# Patient Record
Sex: FEMALE | Race: BLACK OR AFRICAN AMERICAN | Employment: UNEMPLOYED | URBAN - METROPOLITAN AREA
[De-identification: names, ages, dates, MRNs, and addresses within clinical notes are randomized per-mention and may not be internally consistent; named-entity substitution may affect disease eponyms.]

---

## 2017-04-27 ENCOUNTER — HOSPITAL ENCOUNTER (OUTPATIENT)
Dept: MAMMOGRAPHY | Age: 57
Discharge: HOME | End: 2017-04-27
Attending: NURSE PRACTITIONER

## 2017-04-27 DIAGNOSIS — Z12.31 VISIT FOR SCREENING MAMMOGRAM: ICD-10-CM

## 2017-04-27 PROCEDURE — 77067 SCR MAMMO BI INCL CAD: CPT

## 2017-06-19 ENCOUNTER — HOSPITAL ENCOUNTER (OUTPATIENT)
Dept: LAB | Age: 57
Discharge: HOME | End: 2017-06-19

## 2017-06-19 PROCEDURE — 88305 TISSUE EXAM BY PATHOLOGIST: CPT | Performed by: INTERNAL MEDICINE

## 2018-06-16 ENCOUNTER — HOSPITAL ENCOUNTER (OUTPATIENT)
Dept: MAMMOGRAPHY | Age: 58
Discharge: HOME | End: 2018-06-16
Attending: NURSE PRACTITIONER
Payer: SELF-PAY

## 2018-06-16 DIAGNOSIS — Z12.31 VISIT FOR SCREENING MAMMOGRAM: ICD-10-CM

## 2018-06-16 PROCEDURE — 77063 BREAST TOMOSYNTHESIS BI: CPT

## 2019-07-19 ENCOUNTER — HOSPITAL ENCOUNTER (OUTPATIENT)
Dept: MAMMOGRAPHY | Age: 59
Discharge: HOME | End: 2019-07-19
Attending: NURSE PRACTITIONER
Payer: SELF-PAY

## 2019-07-19 DIAGNOSIS — Z12.31 VISIT FOR SCREENING MAMMOGRAM: ICD-10-CM

## 2019-07-19 PROCEDURE — 77063 BREAST TOMOSYNTHESIS BI: CPT

## 2020-08-24 ENCOUNTER — HOSPITAL ENCOUNTER (OUTPATIENT)
Dept: MAMMOGRAPHY | Age: 60
Discharge: HOME | End: 2020-08-24
Attending: NURSE PRACTITIONER

## 2020-08-24 DIAGNOSIS — Z12.31 VISIT FOR SCREENING MAMMOGRAM: ICD-10-CM

## 2020-08-24 PROCEDURE — 77067 SCR MAMMO BI INCL CAD: CPT

## 2021-01-03 ENCOUNTER — HOSPITAL ENCOUNTER (EMERGENCY)
Age: 61
Discharge: HOME | End: 2021-01-03
Attending: EMERGENCY MEDICINE
Payer: OTHER GOVERNMENT

## 2021-01-03 ENCOUNTER — APPOINTMENT (OUTPATIENT)
Dept: CT IMAGING | Age: 61
End: 2021-01-03
Attending: EMERGENCY MEDICINE
Payer: OTHER GOVERNMENT

## 2021-01-03 VITALS
TEMPERATURE: 97.9 F | OXYGEN SATURATION: 95 % | DIASTOLIC BLOOD PRESSURE: 79 MMHG | SYSTOLIC BLOOD PRESSURE: 143 MMHG | HEART RATE: 84 BPM | RESPIRATION RATE: 18 BRPM

## 2021-01-03 DIAGNOSIS — R60.0 SWELLING OF RIGHT PAROTID GLAND: ICD-10-CM

## 2021-01-03 DIAGNOSIS — K11.20 SIALOADENITIS: Primary | ICD-10-CM

## 2021-01-03 LAB
ALBUMIN SERPL-MCNC: 3.5 G/DL (ref 3.2–4.6)
ALBUMIN/GLOB SERPL: 0.9 {RATIO} (ref 1.2–3.5)
ALP SERPL-CCNC: 117 U/L (ref 50–136)
ALT SERPL-CCNC: 47 U/L (ref 12–65)
ANION GAP SERPL CALC-SCNC: 5 MMOL/L (ref 7–16)
AST SERPL-CCNC: 24 U/L (ref 15–37)
BASOPHILS # BLD: 0.1 K/UL (ref 0–0.2)
BASOPHILS NFR BLD: 1 % (ref 0–2)
BILIRUB SERPL-MCNC: 0.2 MG/DL (ref 0.2–1.1)
BUN SERPL-MCNC: 10 MG/DL (ref 8–23)
CALCIUM SERPL-MCNC: 8.3 MG/DL (ref 8.3–10.4)
CHLORIDE SERPL-SCNC: 110 MMOL/L (ref 98–107)
CO2 SERPL-SCNC: 27 MMOL/L (ref 21–32)
CREAT SERPL-MCNC: 0.96 MG/DL (ref 0.6–1)
DIFFERENTIAL METHOD BLD: ABNORMAL
EOSINOPHIL # BLD: 0.2 K/UL (ref 0–0.8)
EOSINOPHIL NFR BLD: 3 % (ref 0.5–7.8)
ERYTHROCYTE [DISTWIDTH] IN BLOOD BY AUTOMATED COUNT: 14.1 % (ref 11.9–14.6)
GLOBULIN SER CALC-MCNC: 4.1 G/DL (ref 2.3–3.5)
GLUCOSE SERPL-MCNC: 108 MG/DL (ref 65–100)
HCT VFR BLD AUTO: 35.5 % (ref 35.8–46.3)
HGB BLD-MCNC: 11.6 G/DL (ref 11.7–15.4)
IMM GRANULOCYTES # BLD AUTO: 0 K/UL (ref 0–0.5)
IMM GRANULOCYTES NFR BLD AUTO: 0 % (ref 0–5)
LYMPHOCYTES # BLD: 3.3 K/UL (ref 0.5–4.6)
LYMPHOCYTES NFR BLD: 36 % (ref 13–44)
MCH RBC QN AUTO: 28.9 PG (ref 26.1–32.9)
MCHC RBC AUTO-ENTMCNC: 32.7 G/DL (ref 31.4–35)
MCV RBC AUTO: 88.5 FL (ref 79.6–97.8)
MONOCYTES # BLD: 0.5 K/UL (ref 0.1–1.3)
MONOCYTES NFR BLD: 5 % (ref 4–12)
NEUTS SEG # BLD: 5.2 K/UL (ref 1.7–8.2)
NEUTS SEG NFR BLD: 55 % (ref 43–78)
NRBC # BLD: 0 K/UL (ref 0–0.2)
PLATELET # BLD AUTO: 364 K/UL (ref 150–450)
PMV BLD AUTO: 10 FL (ref 9.4–12.3)
POTASSIUM SERPL-SCNC: 3.5 MMOL/L (ref 3.5–5.1)
PROT SERPL-MCNC: 7.6 G/DL (ref 6.3–8.2)
RBC # BLD AUTO: 4.01 M/UL (ref 4.05–5.2)
SODIUM SERPL-SCNC: 142 MMOL/L (ref 136–145)
WBC # BLD AUTO: 9.3 K/UL (ref 4.3–11.1)

## 2021-01-03 PROCEDURE — 99283 EMERGENCY DEPT VISIT LOW MDM: CPT

## 2021-01-03 PROCEDURE — 74011000636 HC RX REV CODE- 636: Performed by: EMERGENCY MEDICINE

## 2021-01-03 PROCEDURE — 70491 CT SOFT TISSUE NECK W/DYE: CPT

## 2021-01-03 PROCEDURE — 85025 COMPLETE CBC W/AUTO DIFF WBC: CPT

## 2021-01-03 PROCEDURE — 74011250637 HC RX REV CODE- 250/637: Performed by: EMERGENCY MEDICINE

## 2021-01-03 PROCEDURE — 74011000258 HC RX REV CODE- 258: Performed by: EMERGENCY MEDICINE

## 2021-01-03 PROCEDURE — 80053 COMPREHEN METABOLIC PANEL: CPT

## 2021-01-03 RX ORDER — CLINDAMYCIN HYDROCHLORIDE 150 MG/1
450 CAPSULE ORAL 3 TIMES DAILY
Qty: 63 CAP | Refills: 0 | Status: SHIPPED | OUTPATIENT
Start: 2021-01-03 | End: 2021-01-10

## 2021-01-03 RX ORDER — METHYLPREDNISOLONE 4 MG/1
TABLET ORAL
Qty: 1 DOSE PACK | Refills: 0 | Status: SHIPPED | OUTPATIENT
Start: 2021-01-03 | End: 2021-04-15

## 2021-01-03 RX ORDER — CLINDAMYCIN HYDROCHLORIDE 150 MG/1
450 CAPSULE ORAL
Status: COMPLETED | OUTPATIENT
Start: 2021-01-03 | End: 2021-01-03

## 2021-01-03 RX ORDER — SODIUM CHLORIDE 0.9 % (FLUSH) 0.9 %
10 SYRINGE (ML) INJECTION
Status: COMPLETED | OUTPATIENT
Start: 2021-01-03 | End: 2021-01-03

## 2021-01-03 RX ADMIN — IOPAMIDOL 80 ML: 755 INJECTION, SOLUTION INTRAVENOUS at 21:40

## 2021-01-03 RX ADMIN — SODIUM CHLORIDE 100 ML: 900 INJECTION, SOLUTION INTRAVENOUS at 21:40

## 2021-01-03 RX ADMIN — Medication 10 ML: at 21:40

## 2021-01-03 RX ADMIN — CLINDAMYCIN HYDROCHLORIDE 450 MG: 150 CAPSULE ORAL at 23:02

## 2021-01-03 NOTE — ED TRIAGE NOTES
Patient ambulatory to triage without difficulty; mask in place. Patient reports swelling to right side of face, right below right ear. Patient reports increased pain with chewing or swallowing. Denies throat pain.

## 2021-01-04 NOTE — ED PROVIDER NOTES
50-year-old female with history of diabetes, hypertension presents with complaint of swelling to right side of face that progressively worsened over the past 24 hours. Patient states that she is noticed some mild swelling to the right mandibular/right parotid region over the past several months but has not been worrisome to her. States that today the swelling increased in size and was painful when while she was attempting to eat after [employer]. Denies nausea, vomiting, fever, chills, sore throat, stridor, shortness of breath, chest pain, URI symptoms. Patient does report mild hoarseness to her voice. Patient does report having mumps as a child but is uncertain as to what age she was. The history is provided by the patient. No  was used. Other  This is a new problem. The current episode started more than 2 days ago. The problem occurs constantly. The problem has not changed since onset. Pertinent negatives include no chest pain, no abdominal pain, no headaches and no shortness of breath. Nothing aggravates the symptoms. She has tried nothing for the symptoms. The treatment provided no relief.         Past Medical History:   Diagnosis Date    Diabetes ([address]     NIDDM    Endocrine disease     Hypertension     Other ill-defined conditions(799.89)     anemia    Other ill-defined conditions(799.89)     heart murmur    Seizures (HCC)     only after anesthesia    Sleep disorder     CPAP       Past Surgical History:   Procedure Laterality Date    ABDOMEN SURGERY PROC UNLISTED      exploratory    BREAST SURGERY PROCEDURE UNLISTED      benign cyst removed from breast    HX BREAST REDUCTION Bilateral     HX GYN      tubal lig / hyst    HX ORTHOPAEDIC      left knee    HX OTHER SURGICAL      breast reduction    HX OTHER SURGICAL      tonsillectomy / ad         Family History:   Problem Relation Age of Onset    Breast Cancer Mother     Breast Cancer Sister        Social History Socioeconomic History    Marital status:      Spouse name: Not on file    Number of children: Not on file    Years of education: Not on file    Highest education level: Not on file   Occupational History    Not on file   Social Needs    Financial resource strain: Not on file    Food insecurity     Worry: Not on file     Inability: Not on file    Transportation needs     Medical: Not on file     Non-medical: Not on file   Tobacco Use    Smoking status: Never Smoker   Substance and Sexual Activity    Alcohol use: No    Drug use: Not on file    Sexual activity: Not on file   Lifestyle    Physical activity     Days per week: Not on file     Minutes per session: Not on file    Stress: Not on file   Relationships    Social connections     Talks on phone: Not on file     Gets together: Not on file     Attends Zoroastrianism service: Not on file     Active member of club or organization: Not on file     Attends meetings of clubs or organizations: Not on file     Relationship status: Not on file    Intimate partner violence     Fear of current or ex partner: Not on file     Emotionally abused: Not on file     Physically abused: Not on file     Forced sexual activity: Not on file   Other Topics Concern    Not on file   Social History Narrative    Not on file         ALLERGIES: Adhesive    Review of Systems   Constitutional: Negative for chills, fatigue and fever. HENT: Positive for facial swelling. Negative for congestion, dental problem, ear discharge, ear pain, rhinorrhea, sore throat, trouble swallowing and voice change. Eyes: Negative for visual disturbance. Respiratory: Negative for cough, shortness of breath and wheezing. Cardiovascular: Negative for chest pain. Gastrointestinal: Negative for abdominal pain, nausea and vomiting. Endocrine: Negative for polydipsia and polyuria. Musculoskeletal: Negative for myalgias and neck stiffness. Skin: Negative for rash and wound. Neurological: Negative for dizziness, light-headedness and headaches. Vitals:    01/03/21 1846   BP: (!) 150/85   Pulse: 75   Resp: 16   Temp: 97.9 °F (36.6 °C)   SpO2: 98%            Physical Exam  Vitals signs and nursing note reviewed. Constitutional:       Appearance: Normal appearance. HENT:      Head: Normocephalic. Jaw: There is normal jaw occlusion. Comments: Right parotid/mandibular glands enlarged and tender to palpation. No tongue swelling. No posterior oropharynx edema or swelling noted. No trismus or stridor. No tonsillar erythema or exudate noted. Patient tolerating secretions. No evidence of dental abscess. Right Ear: Tympanic membrane and ear canal normal.      Left Ear: Tympanic membrane and ear canal normal.      Nose: Nose normal.      Mouth/Throat:      Mouth: Mucous membranes are moist.      Pharynx: No oropharyngeal exudate or posterior oropharyngeal erythema. Comments: Uvula midline. Eyes:      Extraocular Movements: Extraocular movements intact. Pupils: Pupils are equal, round, and reactive to light. Neck:      Musculoskeletal: Normal range of motion. No neck rigidity. Comments: FROM. Cardiovascular:      Rate and Rhythm: Normal rate. Pulses: Normal pulses. Pulmonary:      Effort: Pulmonary effort is normal.      Breath sounds: Normal breath sounds. No stridor. Comments: CTAB. No stridor or wheezes. Abdominal:      Palpations: Abdomen is soft. Tenderness: There is no abdominal tenderness. Skin:     Findings: No erythema or rash. Neurological:      General: No focal deficit present. Mental Status: She is alert and oriented to person, place, and time. Motor: No weakness. MDM  Number of Diagnoses or Management Options  Sialoadenitis  Swelling of right parotid gland  Diagnosis management comments: Patient presents with complaint of a right-sided facial swelling.   Basic labs ordered as well as CT soft tissue neck.  ======================  Labs unremarkable. Afebrile. Patient not ill in appearance. Glucose 108. Patient does report history of insulin-dependent diabetes. Patient states she checks her glucose 4 times daily. Consulted ENT. Dr. Alex Lozoya on call. Recommends discharge home with clindamycin and Medrol dosepak. Pt given additional instructions to f/u w/ Dentist. Given return precautions. Amount and/or Complexity of Data Reviewed  Clinical lab tests: ordered and reviewed  Tests in the radiology section of CPT®: ordered and reviewed  Tests in the medicine section of CPT®: ordered and reviewed  Review and summarize past medical records: yes  Independent visualization of images, tracings, or specimens: yes    Risk of Complications, Morbidity, and/or Mortality  Presenting problems: moderate  Diagnostic procedures: low  Management options: moderate    Patient Progress  Patient progress: stable    ED Course as of Jan 03 2234   Sean Fan Jan 03, 2021 2205 CT soft tissue neck IMPRESSION:     1. Stranding around the tail of right parotid gland, suggestive of  sialoadenitis. A few intraglandular sialoliths are present. Clinical follow-up  is recommended.     2. Asymmetric sclerosis of the posterior right hemimandible with lucency around  the posterior most dental implant. Loosening or infection could account for  these findings.       [DF]      ED Course User Index  [DF] Germain Delgado MD       Procedures    Results Include:    Recent Results (from the past 24 hour(s))   CBC WITH AUTOMATED DIFF    Collection Time: 01/03/21  8:50 PM   Result Value Ref Range    WBC 9.3 4.3 - 11.1 K/uL    RBC 4.01 (L) 4.05 - 5.2 M/uL    HGB 11.6 (L) 11.7 - 15.4 g/dL    HCT 35.5 (L) 35.8 - 46.3 %    MCV 88.5 79.6 - 97.8 FL    MCH 28.9 26.1 - 32.9 PG    MCHC 32.7 31.4 - 35.0 g/dL    RDW 14.1 11.9 - 14.6 %    PLATELET 590 265 - 729 K/uL    MPV 10.0 9.4 - 12.3 FL    ABSOLUTE NRBC 0.00 0.0 - 0.2 K/uL    DF AUTOMATED      NEUTROPHILS 55 43 - 78 %    LYMPHOCYTES 36 13 - 44 %    MONOCYTES 5 4.0 - 12.0 %    EOSINOPHILS 3 0.5 - 7.8 %    BASOPHILS 1 0.0 - 2.0 %    IMMATURE GRANULOCYTES 0 0.0 - 5.0 %    ABS. NEUTROPHILS 5.2 1.7 - 8.2 K/UL    ABS. LYMPHOCYTES 3.3 0.5 - 4.6 K/UL    ABS. MONOCYTES 0.5 0.1 - 1.3 K/UL    ABS. EOSINOPHILS 0.2 0.0 - 0.8 K/UL    ABS. BASOPHILS 0.1 0.0 - 0.2 K/UL    ABS. IMM. GRANS. 0.0 0.0 - 0.5 K/UL   METABOLIC PANEL, COMPREHENSIVE    Collection Time: 01/03/21  8:50 PM   Result Value Ref Range    Sodium 142 136 - 145 mmol/L    Potassium 3.5 3.5 - 5.1 mmol/L    Chloride 110 (H) 98 - 107 mmol/L    CO2 27 21 - 32 mmol/L    Anion gap 5 (L) 7 - 16 mmol/L    Glucose 108 (H) 65 - 100 mg/dL    BUN 10 8 - 23 MG/DL    Creatinine 0.96 0.6 - 1.0 MG/DL    GFR est AA >60 >60 ml/min/1.73m2    GFR est non-AA >60 >60 ml/min/1.73m2    Calcium 8.3 8.3 - 10.4 MG/DL    Bilirubin, total 0.2 0.2 - 1.1 MG/DL    ALT (SGPT) 47 12 - 65 U/L    AST (SGOT) 24 15 - 37 U/L    Alk. phosphatase 117 50 - 136 U/L    Protein, total 7.6 6.3 - 8.2 g/dL    Albumin 3.5 3.2 - 4.6 g/dL    Globulin 4.1 (H) 2.3 - 3.5 g/dL    A-G Ratio 0.9 (L) 1.2 - 3.5                      Alireza Jimenez MD; 1/3/2021 @8:36 PM Voice dictation software was used during the making of this note. This software is not perfect and grammatical and other typographical errors may be present.   This note has not been proofread for errors.  ===================================================================

## 2021-01-04 NOTE — DISCHARGE INSTRUCTIONS
Take antibiotic as prescribed. Take steroid as directed. Will need to closely monitor your glucose while taking steroid. Call ENT office on tomorrow to schedule appointment to be seen next 1 to 2 days. Schedule follow-up with dentist within next several days. Return if symptoms worsen or progress in any way.

## 2021-01-04 NOTE — ED NOTES
I have reviewed discharge instructions with the patient. The patient verbalized understanding. Patient left ED via Discharge Method: ambulatory to Home with (self    Opportunity for questions and clarification provided. Patient given 2 scripts. To continue your aftercare when you leave the hospital, you may receive an automated call from our care team to check in on how you are doing. This is a free service and part of our promise to provide the best care and service to meet your aftercare needs.  If you have questions, or wish to unsubscribe from this service please call [phone]. Thank you for Choosing our [facility].

## 2021-04-13 ENCOUNTER — HOSPITAL ENCOUNTER (OUTPATIENT)
Age: 61
Setting detail: OBSERVATION
Discharge: HOME | End: 2021-04-15
Attending: EMERGENCY MEDICINE | Admitting: HOSPITALIST
Payer: OTHER GOVERNMENT

## 2021-04-13 ENCOUNTER — APPOINTMENT (OUTPATIENT)
Dept: CT IMAGING | Age: 61
End: 2021-04-13
Attending: NURSE PRACTITIONER
Payer: OTHER GOVERNMENT

## 2021-04-13 ENCOUNTER — APPOINTMENT (OUTPATIENT)
Dept: CT IMAGING | Age: 61
End: 2021-04-13
Attending: EMERGENCY MEDICINE
Payer: OTHER GOVERNMENT

## 2021-04-13 DIAGNOSIS — E11.49 TYPE 2 DIABETES MELLITUS WITH OTHER NEUROLOGIC COMPLICATION, UNSPECIFIED WHETHER LONG TERM INSULIN USE (HCC): ICD-10-CM

## 2021-04-13 DIAGNOSIS — R00.0 SINUS TACHYCARDIA: ICD-10-CM

## 2021-04-13 DIAGNOSIS — I10 ESSENTIAL HYPERTENSION: ICD-10-CM

## 2021-04-13 DIAGNOSIS — R20.0 RIGHT FACIAL NUMBNESS: Primary | ICD-10-CM

## 2021-04-13 PROBLEM — I63.9 ACUTE CVA (CEREBROVASCULAR ACCIDENT) (HCC): Status: ACTIVE | Noted: 2021-04-13

## 2021-04-13 LAB
ANION GAP SERPL CALC-SCNC: 6 MMOL/L (ref 7–16)
BACTERIA URNS QL MICRO: 0 /HPF
BASOPHILS # BLD: 0.1 K/UL (ref 0–0.2)
BASOPHILS NFR BLD: 1 % (ref 0–2)
BUN SERPL-MCNC: 11 MG/DL (ref 8–23)
CALCIUM SERPL-MCNC: 9.3 MG/DL (ref 8.3–10.4)
CASTS URNS QL MICRO: 0 /LPF
CHLORIDE SERPL-SCNC: 104 MMOL/L (ref 98–107)
CO2 SERPL-SCNC: 28 MMOL/L (ref 21–32)
CREAT SERPL-MCNC: 0.98 MG/DL (ref 0.6–1)
DIFFERENTIAL METHOD BLD: NORMAL
EOSINOPHIL # BLD: 0.1 K/UL (ref 0–0.8)
EOSINOPHIL NFR BLD: 1 % (ref 0.5–7.8)
EPI CELLS #/AREA URNS HPF: NORMAL /HPF
ERYTHROCYTE [DISTWIDTH] IN BLOOD BY AUTOMATED COUNT: 13.4 % (ref 11.9–14.6)
FOLATE SERPL-MCNC: 34.3 NG/ML (ref 3.1–17.5)
GLUCOSE BLD STRIP.AUTO-MCNC: 135 MG/DL (ref 65–100)
GLUCOSE SERPL-MCNC: 132 MG/DL (ref 65–100)
HCT VFR BLD AUTO: 36.5 % (ref 35.8–46.3)
HGB BLD-MCNC: 11.7 G/DL (ref 11.7–15.4)
IMM GRANULOCYTES # BLD AUTO: 0 K/UL (ref 0–0.5)
IMM GRANULOCYTES NFR BLD AUTO: 0 % (ref 0–5)
INR BLD: 1.1 (ref 0.9–1.2)
INR PPP: 1
LYMPHOCYTES # BLD: 2.5 K/UL (ref 0.5–4.6)
LYMPHOCYTES NFR BLD: 24 % (ref 13–44)
MCH RBC QN AUTO: 27.5 PG (ref 26.1–32.9)
MCHC RBC AUTO-ENTMCNC: 32.1 G/DL (ref 31.4–35)
MCV RBC AUTO: 85.9 FL (ref 79.6–97.8)
MONOCYTES # BLD: 0.5 K/UL (ref 0.1–1.3)
MONOCYTES NFR BLD: 5 % (ref 4–12)
NEUTS SEG # BLD: 7.4 K/UL (ref 1.7–8.2)
NEUTS SEG NFR BLD: 69 % (ref 43–78)
NRBC # BLD: 0 K/UL (ref 0–0.2)
PLATELET # BLD AUTO: 391 K/UL (ref 150–450)
PMV BLD AUTO: 10.2 FL (ref 9.4–12.3)
POTASSIUM SERPL-SCNC: 4.1 MMOL/L (ref 3.5–5.1)
PROTHROMBIN TIME: 13.1 SEC (ref 12.5–14.7)
PT BLD: 12.6 SECS (ref 9.6–11.6)
RBC # BLD AUTO: 4.25 M/UL (ref 4.05–5.2)
RBC #/AREA URNS HPF: NORMAL /HPF
SERVICE CMNT-IMP: ABNORMAL
SODIUM SERPL-SCNC: 138 MMOL/L (ref 136–145)
T4 FREE SERPL-MCNC: 1.9 NG/DL (ref 0.9–1.8)
TROPONIN-HIGH SENSITIVITY: 9.2 PG/ML (ref 0–14)
TSH SERPL DL<=0.005 MIU/L-ACNC: <0.005 UIU/ML (ref 0.36–3.74)
VIT B12 SERPL-MCNC: 489 PG/ML (ref 193–986)
WBC # BLD AUTO: 10.7 K/UL (ref 4.3–11.1)
WBC URNS QL MICRO: NORMAL /HPF

## 2021-04-13 PROCEDURE — 99291 CRITICAL CARE FIRST HOUR: CPT | Performed by: PSYCHIATRY & NEUROLOGY

## 2021-04-13 PROCEDURE — 84439 ASSAY OF FREE THYROXINE: CPT

## 2021-04-13 PROCEDURE — 82746 ASSAY OF FOLIC ACID SERUM: CPT

## 2021-04-13 PROCEDURE — 87040 BLOOD CULTURE FOR BACTERIA: CPT

## 2021-04-13 PROCEDURE — 99285 EMERGENCY DEPT VISIT HI MDM: CPT

## 2021-04-13 PROCEDURE — 93005 ELECTROCARDIOGRAM TRACING: CPT | Performed by: EMERGENCY MEDICINE

## 2021-04-13 PROCEDURE — 74011250636 HC RX REV CODE- 250/636: Performed by: HOSPITALIST

## 2021-04-13 PROCEDURE — 70450 CT HEAD/BRAIN W/O DYE: CPT

## 2021-04-13 PROCEDURE — 74011250637 HC RX REV CODE- 250/637: Performed by: EMERGENCY MEDICINE

## 2021-04-13 PROCEDURE — 82962 GLUCOSE BLOOD TEST: CPT

## 2021-04-13 PROCEDURE — 84443 ASSAY THYROID STIM HORMONE: CPT

## 2021-04-13 PROCEDURE — 80048 BASIC METABOLIC PNL TOTAL CA: CPT

## 2021-04-13 PROCEDURE — 96372 THER/PROPH/DIAG INJ SC/IM: CPT

## 2021-04-13 PROCEDURE — 74011000258 HC RX REV CODE- 258: Performed by: EMERGENCY MEDICINE

## 2021-04-13 PROCEDURE — 85025 COMPLETE CBC W/AUTO DIFF WBC: CPT

## 2021-04-13 PROCEDURE — 96374 THER/PROPH/DIAG INJ IV PUSH: CPT

## 2021-04-13 PROCEDURE — 82607 VITAMIN B-12: CPT

## 2021-04-13 PROCEDURE — 74011250636 HC RX REV CODE- 250/636: Performed by: EMERGENCY MEDICINE

## 2021-04-13 PROCEDURE — 81015 MICROSCOPIC EXAM OF URINE: CPT

## 2021-04-13 PROCEDURE — 85610 PROTHROMBIN TIME: CPT

## 2021-04-13 PROCEDURE — 74011250637 HC RX REV CODE- 250/637: Performed by: HOSPITALIST

## 2021-04-13 PROCEDURE — 99218 HC RM OBSERVATION: CPT

## 2021-04-13 PROCEDURE — 74011000636 HC RX REV CODE- 636: Performed by: EMERGENCY MEDICINE

## 2021-04-13 PROCEDURE — 81003 URINALYSIS AUTO W/O SCOPE: CPT

## 2021-04-13 PROCEDURE — 84484 ASSAY OF TROPONIN QUANT: CPT

## 2021-04-13 PROCEDURE — 70498 CT ANGIOGRAPHY NECK: CPT

## 2021-04-13 RX ORDER — LABETALOL HYDROCHLORIDE 5 MG/ML
5 INJECTION, SOLUTION INTRAVENOUS
Status: DISCONTINUED | OUTPATIENT
Start: 2021-04-13 | End: 2021-04-15 | Stop reason: HOSPADM

## 2021-04-13 RX ORDER — INSULIN LISPRO 100 [IU]/ML
INJECTION, SOLUTION INTRAVENOUS; SUBCUTANEOUS
Status: DISCONTINUED | OUTPATIENT
Start: 2021-04-14 | End: 2021-04-15 | Stop reason: HOSPADM

## 2021-04-13 RX ORDER — LEVOTHYROXINE SODIUM 150 UG/1
150 TABLET ORAL
Status: DISCONTINUED | OUTPATIENT
Start: 2021-04-14 | End: 2021-04-14

## 2021-04-13 RX ORDER — ATORVASTATIN CALCIUM 40 MG/1
40 TABLET, FILM COATED ORAL
Status: DISCONTINUED | OUTPATIENT
Start: 2021-04-13 | End: 2021-04-15 | Stop reason: HOSPADM

## 2021-04-13 RX ORDER — GUAIFENESIN 100 MG/5ML
81 LIQUID (ML) ORAL DAILY
Status: DISCONTINUED | OUTPATIENT
Start: 2021-04-14 | End: 2021-04-15 | Stop reason: HOSPADM

## 2021-04-13 RX ORDER — SODIUM CHLORIDE 0.9 % (FLUSH) 0.9 %
5-40 SYRINGE (ML) INJECTION AS NEEDED
Status: DISCONTINUED | OUTPATIENT
Start: 2021-04-13 | End: 2021-04-15 | Stop reason: HOSPADM

## 2021-04-13 RX ORDER — GUAIFENESIN 100 MG/5ML
324 LIQUID (ML) ORAL
Status: COMPLETED | OUTPATIENT
Start: 2021-04-13 | End: 2021-04-13

## 2021-04-13 RX ORDER — ACETAMINOPHEN 325 MG/1
650 TABLET ORAL
Status: DISCONTINUED | OUTPATIENT
Start: 2021-04-13 | End: 2021-04-15 | Stop reason: HOSPADM

## 2021-04-13 RX ORDER — DEXTROSE 50 % IN WATER (D50W) INTRAVENOUS SYRINGE
25-50 AS NEEDED
Status: DISCONTINUED | OUTPATIENT
Start: 2021-04-13 | End: 2021-04-15 | Stop reason: HOSPADM

## 2021-04-13 RX ORDER — SODIUM CHLORIDE, SODIUM LACTATE, POTASSIUM CHLORIDE, CALCIUM CHLORIDE 600; 310; 30; 20 MG/100ML; MG/100ML; MG/100ML; MG/100ML
100 INJECTION, SOLUTION INTRAVENOUS CONTINUOUS
Status: DISPENSED | OUTPATIENT
Start: 2021-04-13 | End: 2021-04-14

## 2021-04-13 RX ORDER — SODIUM CHLORIDE 0.9 % (FLUSH) 0.9 %
5-40 SYRINGE (ML) INJECTION EVERY 8 HOURS
Status: DISCONTINUED | OUTPATIENT
Start: 2021-04-13 | End: 2021-04-15 | Stop reason: HOSPADM

## 2021-04-13 RX ORDER — ENOXAPARIN SODIUM 100 MG/ML
40 INJECTION SUBCUTANEOUS EVERY 24 HOURS
Status: DISCONTINUED | OUTPATIENT
Start: 2021-04-13 | End: 2021-04-15 | Stop reason: HOSPADM

## 2021-04-13 RX ORDER — SODIUM CHLORIDE, SODIUM LACTATE, POTASSIUM CHLORIDE, CALCIUM CHLORIDE 600; 310; 30; 20 MG/100ML; MG/100ML; MG/100ML; MG/100ML
150 INJECTION, SOLUTION INTRAVENOUS CONTINUOUS
Status: DISCONTINUED | OUTPATIENT
Start: 2021-04-13 | End: 2021-04-13

## 2021-04-13 RX ORDER — ONDANSETRON 2 MG/ML
4 INJECTION INTRAMUSCULAR; INTRAVENOUS ONCE
Status: COMPLETED | OUTPATIENT
Start: 2021-04-13 | End: 2021-04-13

## 2021-04-13 RX ORDER — SODIUM CHLORIDE 0.9 % (FLUSH) 0.9 %
10 SYRINGE (ML) INJECTION
Status: COMPLETED | OUTPATIENT
Start: 2021-04-13 | End: 2021-04-13

## 2021-04-13 RX ORDER — DEXTROSE 40 %
15 GEL (GRAM) ORAL AS NEEDED
Status: DISCONTINUED | OUTPATIENT
Start: 2021-04-13 | End: 2021-04-15 | Stop reason: HOSPADM

## 2021-04-13 RX ADMIN — Medication 10 ML: at 14:55

## 2021-04-13 RX ADMIN — IOPAMIDOL 50 ML: 755 INJECTION, SOLUTION INTRAVENOUS at 14:55

## 2021-04-13 RX ADMIN — SODIUM CHLORIDE 100 ML: 900 INJECTION, SOLUTION INTRAVENOUS at 14:56

## 2021-04-13 RX ADMIN — ONDANSETRON 4 MG: 2 INJECTION INTRAMUSCULAR; INTRAVENOUS at 15:08

## 2021-04-13 RX ADMIN — SODIUM CHLORIDE, SODIUM LACTATE, POTASSIUM CHLORIDE, AND CALCIUM CHLORIDE 100 ML/HR: 600; 310; 30; 20 INJECTION, SOLUTION INTRAVENOUS at 17:22

## 2021-04-13 RX ADMIN — ASPIRIN 324 MG: 81 TABLET, CHEWABLE ORAL at 15:08

## 2021-04-13 RX ADMIN — ATORVASTATIN CALCIUM 40 MG: 40 TABLET, FILM COATED ORAL at 22:26

## 2021-04-13 RX ADMIN — ENOXAPARIN SODIUM 40 MG: 40 INJECTION SUBCUTANEOUS at 22:27

## 2021-04-13 RX ADMIN — TICAGRELOR 180 MG: 90 TABLET ORAL at 15:18

## 2021-04-13 RX ADMIN — SODIUM CHLORIDE, SODIUM LACTATE, POTASSIUM CHLORIDE, AND CALCIUM CHLORIDE 150 ML/HR: 600; 310; 30; 20 INJECTION, SOLUTION INTRAVENOUS at 15:20

## 2021-04-13 NOTE — CONSULTS
[address] [facility] [address]  [address] [facility], [address] [facility]          Chief Complaint   Patient presents with    Numbness       Liliana Arango is a 57 y.o. female who presents on referral from the emergency department for an acute Code S. The patient was acutely evaluated in the ED. She presented with the onset of facial numbness which had occurred on the right side last known well 1320 hrs.  blood glucose 135  No prior his significant history no history of migraine no associated headache      Past Medical History:   Diagnosis Date    Diabetes ([address]     NIDDM    Endocrine disease     Hypertension     Other ill-defined conditions(799.89)     anemia    Other ill-defined conditions(799.89)     heart murmur    Seizures (HCC)     only after anesthesia    Sleep disorder     CPAP       Past Surgical History:   Procedure Laterality Date    ABDOMEN SURGERY PROC UNLISTED      exploratory    BREAST SURGERY PROCEDURE UNLISTED      benign cyst removed from breast    HX BREAST REDUCTION Bilateral     HX GYN      tubal lig / hyst    HX ORTHOPAEDIC      left knee    HX OTHER SURGICAL      breast reduction    HX OTHER SURGICAL      tonsillectomy / ad       Family History   Problem Relation Age of Onset    Breast Cancer Mother     Breast Cancer Sister        Social History     Socioeconomic History    Marital status:      Spouse name: Not on file    Number of children: Not on file    Years of education: Not on file    Highest education level: Not on file   Tobacco Use    Smoking status: Never Smoker   Substance and Sexual Activity    Alcohol use: No           Current Facility-Administered Medications:     lactated Ringers infusion, 150 mL/hr, IntraVENous, CONTINUOUS, Lavern Cosme MD, Last Rate: 150 mL/hr at 04/13/21 1520, 150 mL/hr at 04/13/21 1520    Current Outpatient Medications:     methylPREDNISolone (Medrol, Que,) 4 mg tablet, Take as directed., Disp: 1 Dose Pack, Rfl: 0    amLODIPine (NORVASC) 10 mg tablet, Take  by mouth daily. , Disp: , Rfl:     traMADol (ULTRAM) 50 mg tablet, Take 1 Tab by mouth every six (6) hours as needed for Pain. Max Daily Amount: 200 mg., Disp: 30 Tab, Rfl: 0    levothyroxine (SYNTHROID) 300 mcg tablet, Take 300 mcg by mouth daily (before breakfast). , Disp: , Rfl:     metformin (GLUCOPHAGE) 500 mg tablet, Take 500 mg by mouth daily (with breakfast). , Disp: , Rfl:     cyanocobalamin (VITAMIN B-12) 1,000 mcg/mL injection, 1,000 mcg by IntraMUSCular route every seven (7) days. , Disp: , Rfl:     lisinopril-hydrochlorothiazide (PRINZIDE, ZESTORETIC) 10-12.5 mg per tablet, Take 1 Tab by mouth daily. , Disp: , Rfl:     Allergies   Allergen Reactions    Adhesive Other (comments)     \"burns \" skin       Review of Systems  Review of systems  General reports normal energy. Sleep wake cycle appetite  ENT no sinus congestion no epistaxis no unilateral hearing loss no swallowing difficulty  Pulmonary-denies shortness of breath, no orthopnea, no wheezing, no productive sputum no hemoptysis  Cardiac denies chest pain palpitations peripheral edema  GI weight is stable appetite steady no constipation diarrhea abdominal pain  Joints no inflammation no hip pain or shoulder pain no inflammation. No new onset back pain  Skin no rash or ecchymosis  Neuro no syncope vertigo diplopia dysarthria dysphagia difficulty with balance or coordination unilateral weakness   no nocturia. Urinary control is normal.  Current sexual function no issues  Psychiatric no mood disorder no kenny no depression no outbursts or belligerent behavior        Visit Vitals  /63   Pulse (!) 113   Temp 99.2 °F (37.3 °C)   Resp 22   Ht 5' 4\" (1.626 m)   Wt 220 lb (99.8 kg)   SpO2 96%   BMI 37.76 kg/m²       Neurologic Exam  The patient is alert cooperative and oriented. No evident skin lesions no evidence of excessive bruising no trauma  Patient looks well-hydrated.   Does not appear chronically ill. Cranial nerve examination normal visual fields. Normal random eye movements. No ptosis. No lid lag  Face moves strongly symmetrically and equally  Speech is normal  Motor  Upper extremities  Normal bulk strength tone and symmetric arm swing  Lower extremities  Normal bulk strength tone  Deep tendon reflexes 1+ and symmetric at biceps brachioradialis and triceps  Casual gait is normal with no evidence of ataxia  Fine motor coordination is normal in the hands bilaterally    Peripheral sensation light touch normal  There are no carotid bruits  Vital signs are reviewed    Most recent MRI  No results found for this or any previous visit. Most recent MRA  No results found for this or any previous visit. Most recent CTA  Results from [place] encounter on 04/13/21   CTA CODE NEURO HEAD AND NECK W CONT    Narrative Title:  CT arteriogram of the neck and head. Indication: Right-sided facial numbness. Technique: Axial images of the neck and head were obtained after the uneventful   administration of intravenous iodinated contrast media. Contrast was used to  best identify the arterial structures. Images were reviewed on a separate, free  standing, three-dimensional workstation as per the referring physicians request.       All stenosis percentages derived by comparing the narrowest segment with the  distal Internal Carotid Artery luminal diameter, as described in the Nan  American Symptomatic Carotid Endarterectomy Trial (NASCET) criteria. The study was analyzed by the 3FLOZ. ai algorithm. All CT scans at this facility are performed using dose reduction/dose modulation  techniques, as appropriate the performed exam, including the following:   Automated Exposure Control;  Adjustment of the mA and/or kV according to patient  size (this includes techniques or standardized protocols for targeted exams  where dose is matched to indication/reason for exam); and Use of Iterative  Reconstruction Technique. Comparison: None. Findings:     Lungs: Normal  Soft Tissues: Normal  Cervical Spine: Degenerative changes  Aorta: Bovine arch  Great Vessels: Patent    Right ICA: Patent  % Stenosis: 0  Right MCA: Patent  Right ENOC: Patent    Left ICA: Patent  % Stenosis: 0  Left MCA: Patent  Left ENOC: Patent    Right Vertebral: Patent  Left Vertebral: Patent  Dominance: Codominant  Basilar: Patent  Right PCA: Patent with a prominent right posterior communicating artery. Left PCA: Patent    Other Vascular: Negative      Impression No evidence of large vessel occlusion. Most recent Echo  No results found for this visit on 04/13/21. Most recent lipid panels  No results found for: CHOL, CHOLPOCT, CHOLX, CHLST, CHOLV, HDL, HDLPOC, HDLP, LDL, LDLCPOC, LDLC, DLDLP, VLDLC, VLDL, TGLX, TRIGL, TRIGP, TGLPOCT, CHHD, CHHDX    Most recent Hgb A1C  No results found for: HBA1C, HGBE8, DBK4RPPJ, DBD1FMEV        Impression  Acute onset of facial numbness in the setting of multiple risk factors and objective documentation of organicity based on physical examination and CT scanning primarily unremarkable very small basilar artery  Recommend immediate institution of dual antiplatelet therapy load with aspirin and Brilinta.   Standard measures as follows    · Initiate high intensity statin   · Neurochecks Q4H  · MRI of brain  · CTA of head and neck   · Bedside swallow test   · Labs: A1c, FLP, TSH, Cardiac enzymes, BMP, CBC  · Telemetry and echocardiogram with bubble study  · PT/OT/ST  · DVT prophylaxis   · BP management permissive hypertension is appropriate here particularly given caliber of the basilar artery and would not treat unless systolic persistently above 200  · Smoking cessation if applicable   · Diabetes education if applicable   · Depression Screening prior to discharge    Persistent tachycardia noted during CT scan along with evidence of ectopy during the period of time she was under my direct observation during Code S and during this time is critically unstable recommend appropriate management of cardiac rhythm and investigations as noted above  Critical care time 40 minutes    Yessy Stringer MD

## 2021-04-13 NOTE — ED PROVIDER NOTES
The history is provided by the patient. Numbness  This is a new problem. Episode onset: 1 pm. The problem has not changed since onset. There was right facial focality noted. Primary symptoms include loss of sensation. Pertinent negatives include no focal weakness, no slurred speech, no speech difficulty, no visual change, no mental status change and no disorientation. Pertinent negatives include no vomiting, no altered mental status and no headaches.         Past Medical History:   Diagnosis Date    Diabetes ([facility] [address]     NIDDM    Endocrine disease     Hypertension     Other ill-defined conditions(799.89)     anemia    Other ill-defined conditions(799.89)     heart murmur    Seizures ([facility])     only after anesthesia    Sleep disorder     CPAP       Past Surgical History:   Procedure Laterality Date    ABDOMEN SURGERY PROC UNLISTED      exploratory    BREAST SURGERY PROCEDURE UNLISTED      benign cyst removed from breast    HX BREAST REDUCTION Bilateral     HX GYN      tubal lig / hyst    HX ORTHOPAEDIC      left knee    HX OTHER SURGICAL      breast reduction    HX OTHER SURGICAL      tonsillectomy / ad         Family History:   Problem Relation Age of Onset    Breast Cancer Mother     Breast Cancer Sister        Social History     Socioeconomic History    Marital status:      Spouse name: Not on file    Number of children: Not on file    Years of education: Not on file    Highest education level: Not on file   Occupational History    Not on file   Social Needs    Financial resource strain: Not on file    Food insecurity     Worry: Not on file     Inability: Not on file    Transportation needs     Medical: Not on file     Non-medical: Not on file   Tobacco Use    Smoking status: Never Smoker   Substance and Sexual Activity    Alcohol use: No    Drug use: Not on file    Sexual activity: Not on file   Lifestyle    Physical activity     Days per week: Not on file     Minutes per session: Not on file    Stress: Not on file   Relationships    Social connections     Talks on phone: Not on file     Gets together: Not on file     Attends [employer] service: Not on file     Active member of club or organization: Not on file     Attends meetings of clubs or organizations: Not on file     Relationship status: Not on file    Intimate partner violence     Fear of current or ex partner: Not on file     Emotionally abused: Not on file     Physically abused: Not on file     Forced sexual activity: Not on file   Other Topics Concern    Not on file   Social History Narrative    Not on file         ALLERGIES: Adhesive    Review of Systems   Gastrointestinal: Negative for vomiting. Neurological: Positive for numbness. Negative for focal weakness, speech difficulty and headaches. Vitals:    04/13/21 1431   BP: 120/75   Pulse: (!) 115   Resp: 18   Temp: 99.2 °F (37.3 °C)   SpO2: 97%   Weight: 99.8 kg (220 lb)   Height: 5' 4\" (1.626 m)            Physical Exam  Vitals signs and nursing note reviewed. Neurological:      Mental Status: She is alert. Cranial Nerves: No cranial nerve deficit. Sensory: Sensory deficit ( rt facial numbness) present. Motor: No weakness. Coordination: Coordination normal.      Gait: Gait normal.          MDM  Number of Diagnoses or Management Options  Diagnosis management comments: Stroke alert, not a tpa candidate due to mild symptoms, NIH 1. Not an interventional candidate due to mild symptoms NIH 1.    3:56 PM  Neurology recommended aspirin and Brilinta earlier and this was provided. No LVO was seen on CTA. Hospitalist to admit. Perfusion study not done by neuro due to low NIH.   Symptoms persist.       Amount and/or Complexity of Data Reviewed  Clinical lab tests: ordered and reviewed (Results for orders placed or performed during the hospital encounter of 04/13/21  -CBC WITH AUTOMATED DIFF       Result                      Value             Ref Range WBC                         10.7              4.3 - 11.1 K*       RBC                         4.25              4.05 - 5.2 M*       HGB                         11.7              11.7 - 15.4 *       HCT                         36.5              35.8 - 46.3 %       MCV                         85.9              79.6 - 97.8 *       MCH                         27.5              26.1 - 32.9 *       MCHC                        32.1              31.4 - 35.0 *       RDW                         13.4              11.9 - 14.6 %       PLATELET                    391               150 - 450 K/*       MPV                         10.2              9.4 - 12.3 FL       ABSOLUTE NRBC               0.00              0.0 - 0.2 K/*       DF                          AUTOMATED                             NEUTROPHILS                 69                43 - 78 %           LYMPHOCYTES                 24                13 - 44 %           MONOCYTES                   5                 4.0 - 12.0 %        EOSINOPHILS                 1                 0.5 - 7.8 %         BASOPHILS                   1                 0.0 - 2.0 %         IMMATURE GRANULOCYTES       0                 0.0 - 5.0 %         ABS. NEUTROPHILS            7.4               1.7 - 8.2 K/*       ABS. LYMPHOCYTES            2.5               0.5 - 4.6 K/*       ABS. MONOCYTES              0.5               0.1 - 1.3 K/*       ABS. EOSINOPHILS            0.1               0.0 - 0.8 K/*       ABS. BASOPHILS              0.1               0.0 - 0.2 K/*       ABS. IMM.  GRANS.            0.0               0.0 - 0.5 K/*  -METABOLIC PANEL, BASIC       Result                      Value             Ref Range           Sodium                      138               136 - 145 mm*       Potassium                   4.1               3.5 - 5.1 mm*       Chloride                    104               98 - 107 mmo*       CO2                         28                21 - 32 mmol* Anion gap                   6 (L)             7 - 16 mmol/L       Glucose                     132 (H)           65 - 100 mg/*       BUN                         11                8 - 23 MG/DL        Creatinine                  0.98              0.6 - 1.0 MG*       GFR est AA                  >60               >60 ml/min/1*       GFR est non-AA              >60               >60 ml/min/1*       Calcium                     9.3               8.3 - 10.4 M*  -PROTHROMBIN TIME + INR       Result                      Value             Ref Range           Prothrombin time            13.1              12.5 - 14.7 *       INR                         1.0                              -TROPONIN-HIGH SENSITIVITY       Result                      Value             Ref Range           Troponin-High Sensitiv*     9.2               0 - 14 pg/mL   -POC PT/INR       Result                      Value             Ref Range           Prothrombin time (POC)      12.6 (H)          9.6 - 11.6 S*       INR (POC)                   1.1               0.9 - 1.2      -GLUCOSE, POC       Result                      Value             Ref Range           Glucose (POC)               135 (H)           65 - 100 mg/*       Performed by                                                  Brooke  )  Tests in the radiology section of CPT®: ordered and reviewed (Cta Code Neuro Head And Neck W Cont    Result Date: 4/13/2021  Title:  CT arteriogram of the neck and head. Indication: Right-sided facial numbness. Technique: Axial images of the neck and head were obtained after the uneventful administration of intravenous iodinated contrast media. Contrast was used to best identify the arterial structures.   Images were reviewed on a separate, free standing, three-dimensional workstation as per the referring physicians request.  All stenosis percentages derived by comparing the narrowest segment with the distal Internal Carotid Artery luminal diameter, as described in the [facility] Symptomatic Carotid Endarterectomy Trial (NASCET) criteria. The study was analyzed by the [employer]. ai algorithm. All CT scans at this facility are performed using dose reduction/dose modulation techniques, as appropriate the performed exam, including the following: Automated Exposure Control; Adjustment of the mA and/or kV according to patient size (this includes techniques or standardized protocols for targeted exams where dose is matched to indication/reason for exam); and Use of Iterative Reconstruction Technique. Comparison: None. Findings: Lungs: Normal Soft Tissues: Normal Cervical Spine: Degenerative changes Aorta: Bovine arch Great Vessels: Patent Right ICA: Patent % Stenosis: 0 Right MCA: Patent Right ENOC: Patent Left ICA: Patent % Stenosis: 0 Left MCA: Patent Left ENOC: Patent Right Vertebral: Patent Left Vertebral: Patent Dominance: Codominant Basilar: Patent Right PCA: Patent with a prominent right posterior communicating artery. Left PCA: Patent Other Vascular: Negative     No evidence of large vessel occlusion. Ct Code Neuro Head Wo Contrast    Result Date: 4/13/2021  CT head without contrast History: patient with acute neuro changes. Code stroke. Right-sided facial numbness beginning 1 hour ago Technique: 5mm axial images were obtained from the skull base to the vertex without intravenous contrast.  Radiation dose reduction techniques were used for this study:  Our CT scanners use one or all of the following: Automated exposure control, adjustment of the mA and/or kVp according to patient's size, iterative reconstruction. Comparison: None Findings: The ventricles and sulci are normal in size and configuration. There is a 5 mm focus of increased density at the region of the anterior third ventricle/foramen of 0 most compatible with a colloid cyst. There are no extra-axial fluid collections. There is no evidence to suggest an acute major territorial infarct.  There is no evidence of acute intraparenchymal hemorrhage or mass effect. The bony calvarium is intact. The visualized mastoid air cells and paranasal sinuses are well pneumatized and aerated. 1. Colloid cyst. 2. Otherwise unremarkable unenhanced CT scan of the brain.     )  Tests in the medicine section of CPT®: ordered and reviewed  Discuss the patient with other providers: yes    Risk of Complications, Morbidity, and/or Mortality  Presenting problems: high  Diagnostic procedures: moderate  Management options: low  General comments: CRITICAL CARE Documentation: This patient is critically ill and there is a high probability of of imminent or life threatening deterioration in the patient's condition without immediate management. The nature of the patient's clinical problem is: Right facial numbness and stroke symptoms    I have spent 30 minutes in direct patient care, documentation, review of labs/xrays/old records, discussion with Staff, patient, consultants . The time involved in the performance of separately reportable procedures was not counted toward critical care time.      Kesha Fulton MD; 4/13/2021 @3:57 PM        Patient Progress  Patient progress: stable         Procedures

## 2021-04-13 NOTE — ED TRIAGE NOTES
Patient arrives ambulatory to triage with mask in place. Patient reports onset of numbness to right side of face at approximately 1300 while she was volunteering at food bank. Patient denies unilateral weakness, denies trouble with speech. Patient ambulatory without difficulty. Reports recently has had elevated heart rate that [facility] going [facility] \"[facility]\" for. Denies chest pain.

## 2021-04-13 NOTE — H&P
[facility]  History and Physical    Patient ID:  Jc Stack  female  1960  [id number]    Admission Date: 2021  Chief Complaint: Right-sided paresthesia  Reason for Admission: Strokelike symptoms    ASSESSMENT & PLAN:  HPI: 71-year-old right-hand-dominant -American female past medical history of diabetes mellitus type 2 with insulin dependence, hypertension and tachycardia, hypothyroidism, obesity. She was volunteering at [employer] today they normally finish around 1 PM, she started to have heaviness sensation on the right side of her face that was subtle, she then drove home and called her son and explained her symptoms to him at that time, of concern he drove her to the emergency department for evaluation of radicular symptoms. At baseline she is Right-hand-dominant female, baseline very active, volunteers at a [employer], [employer] education working on her masters degree, and is active with her grandchildren. A Code Stroke was activated emergency department. ED course:  -She was normotensive, but also tachycardic  -Blood glucose 135  -CBC: WBC 10.7, hemoglobin 9.7, platelets 879  -Metabolic panel: Sodium 207, potassium 4.1, chloride 104, bicarbonate 28, BUN 11, creatinine 0.98, glucose 132, calcium 9.3  -Coagulation profile: INR 1.0, prothrombin time 13.1  -Cardiac troponin within normal limits  -CT head showing colloid cyst, unremarkable. CTA shows no large vessel occlusion     Strokelike symptoms/acute CVA  -Right-hand-dominant female, Aspirin Naive, functional baseline very active, volunteers at a [employer], [employer] education working on her masters degree, and is active with her grandchildren  -Risk factors for CVA, family history, her father  of a CVA, she has a history of obesity, hypertension, diabetes mellitus type 2  -Onset of symptoms  @ 1300 with fasting symptoms as right facial paresthesia  -Not a tpa candidate due to mild symptoms, NIH 1.   Not an interventional candidate due to mild symptoms NIH 1.  -CT head shows colloid cyst  5 mm focus of increased density at the region of the anterior third  ventricle/foramen  otherwise unremarkable CT head. CTA showed no large vessel occlusion.  -Neurology consulted and impression is multiple risk factors for acute CVA, and requested dual antiplatelet therapy including aspirin 81 mg, and Brilinta load and maintenance,   -admission TIA/CVA admission order set has been placed including NIHSS stroke scale, PT/OT/ST/PMNR/stroke relator/neurology consultations, additional neuro cardiovascular imaging including MRI brain, 2D transthoracic echocardiogram with bubble study, allowing permissive hypertension with antihypertensive regiment as needed IV labetalol for blood pressure of 220/120 mmhg , A1c/ lipid/TSH/B12/Folate screen.  -Note she received the COVID-19 mode during her vaccination, her second dose was administered in the left arm yesterday 04/12 she has sore arm otherwise no systemic symptoms     Diabetes mellitus type 2, obesity  -With insulin dependence, she states she uses short acting insulin 18 units 3 times daily AC, and 65 units of long-acting insulin, she has a kimberly freestyle device and glucose sensor subcutaneously implanted in her left arm.   She reports her last hemoglobin A1c was 6.1, she does have symptomatic infrequent episodes of hypoglycemia for which she treats herself with glucose tablets and or juice.  -For now hold basal and bolus insulin in favor of Point-of-care glucose with insulin sign scale management, consistent carbohydrate diet, obesity leading to complexity of medical care    Hypertension and tachycardia  -Note: She was recently started on metoprolol for tachycardia and was to be referred to a cardiac stress test by her PCP   -Holding antihypertensive medications amlodipine, chlorthalidone, lisinopril, metoprolol in the setting of acute CVA and permissive hypertension    Hypothyroidism  -Chemically hypothyroid, no history of thyroidectomy  -Check TSH, FT4  -Resume levothyroxine 150mg daily     Disposition: Telemetry   Diet: Distant carbohydrate diet, cardiac  VTE ppx: Lovenox SC   GI ppx: PRN  CODE STATUS: Full   Surrogate decision-maker: Her  Nuzhat Omer     This visit took in excess of 45 minutes of Critical Care time in evaluation and management of a critically ill or injured patient, such that the critical illness or injury acutely impairs one or more organ system: Acute CVA such that there is a high probability of imminent or life-threatening deterioration in the patient's condition needing immediate attention or presence of critical care physician near bedside for the above time . The time listed is exclusive of any procedures which may have been performed. Critical care time includes time spent at bedside performing patient interview and physical exam, time spent researching patient prior to interaction with patient, time spent discussing findings and treatment plan with patient and/or family, time spent discussing patient with consultants and colleagues, time spent reviewing pertinent laboratory and radiographic evaluations, time spent re-evaluating patient, or time spent discussing patient with nursing staff. Allergies   Allergen Reactions    Adhesive Other (comments)     \"burns \" skin       Prior to Admission Medications   Prescriptions Last Dose Informant Patient Reported? Taking? amLODIPine (NORVASC) 10 mg tablet   Yes No   Sig: Take  by mouth daily. cyanocobalamin (VITAMIN B-12) 1,000 mcg/mL injection   Yes No   Si,000 mcg by IntraMUSCular route every seven (7) days. levothyroxine (SYNTHROID) 300 mcg tablet   Yes No   Sig: Take 300 mcg by mouth daily (before breakfast). lisinopril-hydrochlorothiazide (PRINZIDE, ZESTORETIC) 10-12.5 mg per tablet   Yes No   Sig: Take 1 Tab by mouth daily.    metformin (GLUCOPHAGE) 500 mg tablet Yes No   Sig: Take 500 mg by mouth daily (with breakfast). methylPREDNISolone (Medrol, Que,) 4 mg tablet   No No   Sig: Take as directed. traMADol (ULTRAM) 50 mg tablet   No No   Sig: Take 1 Tab by mouth every six (6) hours as needed for Pain. Max Daily Amount: 200 mg. Facility-Administered Medications: None       Past Medical History:   Diagnosis Date    Diabetes ([facility] [address]     NIDDM    Endocrine disease     Hypertension     Other ill-defined conditions(799.89)     anemia    Other ill-defined conditions(799.89)     heart murmur    Seizures (HCC)     only after anesthesia    Sleep disorder     CPAP     Past Surgical History:   Procedure Laterality Date    ABDOMEN SURGERY PROC UNLISTED      exploratory    BREAST SURGERY PROCEDURE UNLISTED      benign cyst removed from breast    HX BREAST REDUCTION Bilateral     HX GYN      tubal lig / hyst    HX ORTHOPAEDIC      left knee    HX OTHER SURGICAL      breast reduction    HX OTHER SURGICAL      tonsillectomy / ad       Social History     Tobacco Use    Smoking status: Never Smoker   Substance Use Topics    Alcohol use: No       FAMILY HISTORY:    Family History   Problem Relation Age of Onset    Breast Cancer Mother     Breast Cancer Sister        REVIEW OF SYSTEMS:  A 14 point review of systems was taken and pertinent positive as per HPI.       PHYSICAL EXAM:    Visit Vitals  /63   Pulse (!) 113   Temp 99.2 °F (37.3 °C)   Resp 22   Ht 5' 4\" (1.626 m)   Wt 99.8 kg (220 lb)   SpO2 96%   BMI 37.76 kg/m²       General: No acute distress, speaking in full sentences, no use of accessory muscles   HEENT: Pupils equal and reactive to light and accommodation, oropharynx is clear   Neck: Supple, no lymphadenopathy, no JVD   Lungs: Clear to auscultation bilaterally   Cardiovascular: Regular rate and rhythm with normal S1 and S2   Abdomen: Soft, nontender, nondistended, normoactive bowel sounds   Extremities: No cyanosis clubbing or edema   Neuro: Nonfocal, A&O x3   Psych: Normal mood and affect     Intake/Output last 3 shifts:        Labs:  CMP:   Lab Results   Component Value Date/Time     04/13/2021 02:41 PM    K 4.1 04/13/2021 02:41 PM     04/13/2021 02:41 PM    CO2 28 04/13/2021 02:41 PM    AGAP 6 (L) 04/13/2021 02:41 PM     (H) 04/13/2021 02:41 PM    BUN 11 04/13/2021 02:41 PM    CREA 0.98 04/13/2021 02:41 PM    GFRAA >60 04/13/2021 02:41 PM    GFRNA >60 04/13/2021 02:41 PM    CA 9.3 04/13/2021 02:41 PM         CBC:    Lab Results   Component Value Date/Time    WBC 10.7 04/13/2021 02:41 PM    HGB 11.7 04/13/2021 02:41 PM    HCT 36.5 04/13/2021 02:41 PM     04/13/2021 02:41 PM       Lab Results   Component Value Date/Time    INR 1.0 04/13/2021 02:41 PM    INR (POC) 1.1 04/13/2021 02:34 PM    Prothrombin time 13.1 04/13/2021 02:41 PM       ABG:  No results found for: PH, PHI, PCO2, PCO2I, PO2, PO2I, HCO3, HCO3I, FIO2, FIO2I        Lab Results   Component Value Date/Time    Troponin-I <0.05 06/21/2011 10:34 PM    BNP 23 06/21/2011 10:34 PM         Imaging & Other Studies:    XR Results (maximum last 3): Results from [place] encounter on 12/31/16   XR SPINE LUMB 2 OR 3 V    Narrative History: Low back and right hip pain, couple of weeks duration    EXAM: Lumbar spine series    FINDINGS: AP, lateral, and cone-down lateral views of the lumbar spine  demonstrate anterior osteophyte formation at multiple levels. There is  preservation of vertebral body height. The pedicles are intact. The SI joints  are patent. Impression IMPRESSION: No acute abnormality. XR HIP RT W OR WO PELV 2-3 VWS    Narrative History: Right hip pain    EXAM: Right hip series    FINDINGS: AP view the pelvis and AP and frog-leg lateral views of the right hip  demonstrate degenerative changes of the hips bilaterally without fracture,  dislocation, or additional abnormality.       Impression IMPRESSION: Degenerative changes of the hips without additional abnormality. Results from [place] encounter on 07/24/16   XR KNEE RT 3 V    Narrative Right knee    CLINICAL INDICATION: Severe acute pain and swelling without reported injury    COMPARISON: None    TECHNIQUE: 3 views    FINDINGS: There is no evidence of fracture, dislocation or osseous erosion. There is mild chondrocalcinosis at the femoral-tibial joint. There are large,  chunky and coarse chronic appearing calcifications projecting over the  suprapatellar right knee joint. These are of uncertain significance and may be  due to chronic tendinopathy or remote injury among other possibilities. Moderate  tricompartmental degenerative osteophytes and narrowing are noted. There is no  evidence of a large joint effusion. Impression IMPRESSION:  1. No acute osseous abnormality. Degenerative change. 2. Chronic appearing joint calcifications. CT Results (maximum last 3): Results from [place] encounter on 04/13/21   CTA CODE NEURO HEAD AND NECK W CONT    Narrative Title:  CT arteriogram of the neck and head. Indication: Right-sided facial numbness. Technique: Axial images of the neck and head were obtained after the uneventful   administration of intravenous iodinated contrast media. Contrast was used to  best identify the arterial structures. Images were reviewed on a separate, free  standing, three-dimensional workstation as per the referring physicians request.       All stenosis percentages derived by comparing the narrowest segment with the  distal Internal Carotid Artery luminal diameter, as described in the Nan  American Symptomatic Carotid Endarterectomy Trial (NASCET) criteria. The study was analyzed by the [employer]. ai algorithm. All CT scans at this facility are performed using dose reduction/dose modulation  techniques, as appropriate the performed exam, including the following:   Automated Exposure Control;  Adjustment of the mA and/or kV according to patient  size (this includes techniques or standardized protocols for targeted exams  where dose is matched to indication/reason for exam); and Use of Iterative  Reconstruction Technique. Comparison: None. Findings:     Lungs: Normal  Soft Tissues: Normal  Cervical Spine: Degenerative changes  Aorta: Bovine arch  Great Vessels: Patent    Right ICA: Patent  % Stenosis: 0  Right MCA: Patent  Right ENOC: Patent    Left ICA: Patent  % Stenosis: 0  Left MCA: Patent  Left ENOC: Patent    Right Vertebral: Patent  Left Vertebral: Patent  Dominance: Codominant  Basilar: Patent  Right PCA: Patent with a prominent right posterior communicating artery. Left PCA: Patent    Other Vascular: Negative      Impression No evidence of large vessel occlusion. CT CODE NEURO HEAD WO CONTRAST    Narrative CT head without contrast    History: patient with acute neuro changes. Code stroke. Right-sided facial  numbness beginning 1 hour ago    Technique: 5mm axial images were obtained from the skull base to the vertex  without intravenous contrast.  Radiation dose reduction techniques were used for  this study:  Our CT scanners use one or all of the following: Automated exposure  control, adjustment of the mA and/or kVp according to patient's size, iterative  reconstruction. Comparison: None    Findings: The ventricles and sulci are normal in size and configuration. There  is a 5 mm focus of increased density at the region of the anterior third  ventricle/foramen of 0 most compatible with a colloid cyst. There are no  extra-axial fluid collections. There is no evidence to suggest an acute major  territorial infarct. There is no evidence of acute intraparenchymal hemorrhage  or mass effect. The bony calvarium is intact. The visualized mastoid air cells  and paranasal sinuses are well pneumatized and aerated. Impression 1. Colloid cyst.  2. Otherwise unremarkable unenhanced CT scan of the brain.        Results from [facility] [place] encounter on 01/03/21   CT NECK SOFT TISSUE W CONT    Narrative SOFT TISSUE NECK CT WITH CONTRAST  1/3/2021 9:44 PM    HISTORY: R parotid/mandibular swelling    TECHNIQUE: 80 mL Isovue-370 nonionic IV contrast was administered intravenously. Axial images were obtained from the orbital roofs through the thoracic inlet. Coronal reformatted images were generated. All CT scans at this facility  used dose modulation, iterative reconstruction and/or weight based dosing when  appropriate to reduce radiation dose to as low as reasonably achievable. COMPARISON:  None available. FINDINGS: There is asymmetric stranding around the tail of the right parotid  gland. A few small intraglandular sialoliths are present. There is no  appreciable distention of the parotid duct and no intraductal stones are  demonstrated. There is a periapical lucency around the posterior right mandibular implant with  sclerosis of the surrounding mandible. This change may be caused by loosening of  the implant or infection. There is no visible abscess in the adjacent soft  tissues. There is minimal mucoid density debris in the left maxillary sinus. The bilateral submandibular glands are normal in appearance. The bilateral  parapharyngeal fat is symmetric in appearance. There are no large enhancing  mucosal lesions within the oropharynx. There is no cervical adenopathy. Included portions of the upper lobes are clear. Impression IMPRESSION:    1. Stranding around the tail of right parotid gland, suggestive of  sialoadenitis. A few intraglandular sialoliths are present. Clinical follow-up  is recommended. 2. Asymmetric sclerosis of the posterior right hemimandible with lucency around  the posterior most dental implant. Loosening or infection could account for  these findings. MRI Results (maximum last 3): No results found for this or any previous visit. Nuclear Medicine Results (maximum last 3):   No results found for this or any previous visit. US Results (maximum last 3): No results found for this or any previous visit. DEXA Results (maximum last 3): No results found for this or any previous visit. [facility] Results (maximum last 3): Results from [place] encounter on 08/24/20   [facility] MAMMO BI SCREENING INCL CAD    Narrative STUDY:  Bilateral digital screening mammogram      INDICATION:  Screening;  previous bilateral reduction surgery, her mother had  breast cancer    COMPARISON:  7/19/2019 and others going back to 1/20/2011       FINDINGS: Bilateral digital screening mammography was performed, and is  interpreted in conjunction with a computer assisted detection (CAD) system. The breast parenchyma is mostly fatty replaced. No suspicious developing masses, calcifications, or architectural distortion are  identified. There is no increased skin thickening or nipple retraction. Impression IMPRESSION:  No mammographic evidence of malignancy. Recommend annual mammogram in one year. A reminder letter will be scheduled. BI-RADS Assessment Category 1: Negative. BD1   Results from Hospital Encounter encounter on 07/19/19   [facility] 3D ARTHUR W MAMMO BI SCREENING INCL CAD    Narrative BILATERAL SCREENING DIGITAL MAMMOGRAPHY WITH TOMOSYNTHESIS:    CLINICAL HISTORY:   Prior benign right biopsy and bilateral reduction  mammoplasty with a strong family history of breast cancer. TECHNIQUE:  Craniocaudal and mediolateral oblique views as well as digital  tomography of both breasts were performed and are interpreted in conjunction  with a computer assisted detection (CAD) system. COMPARISON:   [facility] dating from February 26, 2015. FINDINGS:  There is fatty replaced fibroglandular tissue in a fairly symmetric  pattern bilaterally. No new or enlarging soft tissue mass, suspicious  calcifications, or other definite evidence for malignancy is seen.   Scattered  typically benign bilateral calcifications, postoperative architectural  distortion, and small axillary tail lymph nodes are again noted. No significant  change is seen from prior study. Impression IMPRESSION:         NO SPECIFIC MAMMOGRAPHIC EVIDENCE FOR MALIGNANCY. FOLLOW UP BILATERAL SCREENING  MAMMOGRAPHY IS RECOMMENDED IN ONE YEAR. BI-RADS Assessment Category 2: Benign finding. A reminder letter will be scheduled. BD1   Results from Hospital Encounter encounter on 06/16/18   [facility] 3D ARTHUR W MAMMO BI SCREENING INCL CAD    Narrative STUDY:  Bilateral digital screening mammogram with CAD and Tomosynthesis  6/16/2018    INDICATION:  Routine screening mammogram.    COMPARISON:  Bilateral mammograms 4/27/2017, and 2/26/2015        FINDINGS:   Bilateral digital screening mammography, interpreted in conjunction with CAD  overread. The breasts are composed of scattered fibroglandular elements. The  axilla contain benign appearing lymph nodes. No evidence of evolving skin  thickening, or nipple retraction is seen. Grossly stable bilateral breast  calcifications are seen. No evolving unique clustered microcalcifications are  seen to suggest a worrisome process. No evolving dominant mass, spiculated  density, or architectural distortion is seen. Bilateral breast tomosynthesis was performed, and is interpreted in conjunction  with a computer assisted detection (CAD) system. No suspicious masses,  calcifications, or architectural distortion are identified otherwise. Impression IMPRESSION:    1. No mammographic evidence of malignancy. Recommend annual mammogram in one  year. A reminder letter will be scheduled. We are mailing breast density information to the patient along with the  mammogram report. BI-RADS Assessment Category 2: Benign finding. BD2        IR Results (maximum last 3): No results found for this or any previous visit. VAS/US Results (maximum last 3):   No results found for this or any previous visit. PET Results (maximum last 3): No results found for this or any previous visit. EKG Results     Procedure 720 Value Units Date/Time    Initial ECG [[id number]]     Order Status: Completed           Active Problems: There are no active problems to display for this patient.         Tamiko Montejo MD  [address] [place] [facility]  4/13/2021 4:30 PM

## 2021-04-14 ENCOUNTER — APPOINTMENT (OUTPATIENT)
Dept: MRI IMAGING | Age: 61
End: 2021-04-14
Attending: HOSPITALIST
Payer: OTHER GOVERNMENT

## 2021-04-14 LAB
ATRIAL RATE: 110 BPM
CALCULATED P AXIS, ECG09: 56 DEGREES
CALCULATED R AXIS, ECG10: 22 DEGREES
CALCULATED T AXIS, ECG11: 29 DEGREES
CHOLEST SERPL-MCNC: 157 MG/DL
DIAGNOSIS, 93000: NORMAL
ERYTHROCYTE [DISTWIDTH] IN BLOOD BY AUTOMATED COUNT: 13.3 % (ref 11.9–14.6)
EST. AVERAGE GLUCOSE BLD GHB EST-MCNC: 157 MG/DL
GLUCOSE BLD STRIP.AUTO-MCNC: 110 MG/DL (ref 65–100)
GLUCOSE BLD STRIP.AUTO-MCNC: 153 MG/DL (ref 65–100)
GLUCOSE BLD STRIP.AUTO-MCNC: 156 MG/DL (ref 65–100)
GLUCOSE BLD STRIP.AUTO-MCNC: 196 MG/DL (ref 65–100)
HBA1C MFR BLD: 7.1 % (ref 4.2–6.3)
HCT VFR BLD AUTO: 32.6 % (ref 35.8–46.3)
HDLC SERPL-MCNC: 31 MG/DL (ref 40–60)
HDLC SERPL: 5.1 {RATIO}
HGB BLD-MCNC: 10.8 G/DL (ref 11.7–15.4)
LDLC SERPL CALC-MCNC: 96.6 MG/DL
LIPID PROFILE,FLP: ABNORMAL
MCH RBC QN AUTO: 27.8 PG (ref 26.1–32.9)
MCHC RBC AUTO-ENTMCNC: 33.1 G/DL (ref 31.4–35)
MCV RBC AUTO: 83.8 FL (ref 79.6–97.8)
NRBC # BLD: 0 K/UL (ref 0–0.2)
P-R INTERVAL, ECG05: 158 MS
PLATELET # BLD AUTO: 355 K/UL (ref 150–450)
PMV BLD AUTO: 10.1 FL (ref 9.4–12.3)
Q-T INTERVAL, ECG07: 330 MS
QRS DURATION, ECG06: 76 MS
QTC CALCULATION (BEZET), ECG08: 446 MS
RBC # BLD AUTO: 3.89 M/UL (ref 4.05–5.2)
SERVICE CMNT-IMP: ABNORMAL
TRIGL SERPL-MCNC: 147 MG/DL (ref 35–150)
VENTRICULAR RATE, ECG03: 110 BPM
VLDLC SERPL CALC-MCNC: 29.4 MG/DL (ref 6–23)
WBC # BLD AUTO: 9.4 K/UL (ref 4.3–11.1)

## 2021-04-14 PROCEDURE — 74011636637 HC RX REV CODE- 636/637: Performed by: HOSPITALIST

## 2021-04-14 PROCEDURE — 99218 HC RM OBSERVATION: CPT

## 2021-04-14 PROCEDURE — 99232 SBSQ HOSP IP/OBS MODERATE 35: CPT | Performed by: PSYCHIATRY & NEUROLOGY

## 2021-04-14 PROCEDURE — 97165 OT EVAL LOW COMPLEX 30 MIN: CPT

## 2021-04-14 PROCEDURE — 74011250637 HC RX REV CODE- 250/637: Performed by: HOSPITALIST

## 2021-04-14 PROCEDURE — 97535 SELF CARE MNGMENT TRAINING: CPT

## 2021-04-14 PROCEDURE — 82962 GLUCOSE BLOOD TEST: CPT

## 2021-04-14 PROCEDURE — 36415 COLL VENOUS BLD VENIPUNCTURE: CPT

## 2021-04-14 PROCEDURE — 83036 HEMOGLOBIN GLYCOSYLATED A1C: CPT

## 2021-04-14 PROCEDURE — 96372 THER/PROPH/DIAG INJ SC/IM: CPT

## 2021-04-14 PROCEDURE — 85027 COMPLETE CBC AUTOMATED: CPT

## 2021-04-14 PROCEDURE — 74011250636 HC RX REV CODE- 250/636: Performed by: HOSPITALIST

## 2021-04-14 PROCEDURE — 92610 EVALUATE SWALLOWING FUNCTION: CPT

## 2021-04-14 PROCEDURE — 80061 LIPID PANEL: CPT

## 2021-04-14 PROCEDURE — 70551 MRI BRAIN STEM W/O DYE: CPT

## 2021-04-14 RX ORDER — INSULIN GLARGINE 100 [IU]/ML
65 INJECTION, SOLUTION SUBCUTANEOUS
COMMUNITY

## 2021-04-14 RX ORDER — LISINOPRIL 10 MG/1
10 TABLET ORAL DAILY
COMMUNITY
End: 2021-04-15

## 2021-04-14 RX ORDER — MELATONIN
1000 DAILY
COMMUNITY

## 2021-04-14 RX ORDER — CHLORTHALIDONE 25 MG/1
TABLET ORAL DAILY
COMMUNITY
End: 2021-04-15

## 2021-04-14 RX ORDER — INSULIN ASPART 100 [IU]/ML
18 INJECTION, SOLUTION INTRAVENOUS; SUBCUTANEOUS
COMMUNITY

## 2021-04-14 RX ORDER — METOPROLOL TARTRATE 25 MG/1
TABLET, FILM COATED ORAL 2 TIMES DAILY
COMMUNITY
End: 2021-04-15

## 2021-04-14 RX ORDER — LEVOTHYROXINE SODIUM 100 UG/1
100 TABLET ORAL
Status: DISCONTINUED | OUTPATIENT
Start: 2021-04-15 | End: 2021-04-15 | Stop reason: HOSPADM

## 2021-04-14 RX ADMIN — INSULIN LISPRO 2 UNITS: 100 INJECTION, SOLUTION INTRAVENOUS; SUBCUTANEOUS at 18:11

## 2021-04-14 RX ADMIN — TICAGRELOR 90 MG: 90 TABLET ORAL at 05:38

## 2021-04-14 RX ADMIN — INSULIN LISPRO 2 UNITS: 100 INJECTION, SOLUTION INTRAVENOUS; SUBCUTANEOUS at 09:47

## 2021-04-14 RX ADMIN — TICAGRELOR 90 MG: 90 TABLET ORAL at 18:40

## 2021-04-14 RX ADMIN — Medication 10 ML: at 20:19

## 2021-04-14 RX ADMIN — ENOXAPARIN SODIUM 40 MG: 40 INJECTION SUBCUTANEOUS at 20:19

## 2021-04-14 RX ADMIN — ATORVASTATIN CALCIUM 40 MG: 40 TABLET, FILM COATED ORAL at 20:19

## 2021-04-14 RX ADMIN — Medication 10 ML: at 05:38

## 2021-04-14 RX ADMIN — ASPIRIN 81 MG: 81 TABLET, CHEWABLE ORAL at 08:48

## 2021-04-14 RX ADMIN — Medication 5 ML: at 13:03

## 2021-04-14 RX ADMIN — LEVOTHYROXINE SODIUM 150 MCG: 150 TABLET ORAL at 05:39

## 2021-04-14 RX ADMIN — Medication 5 ML: at 01:26

## 2021-04-14 NOTE — PROGRESS NOTES
ACUTE OT GOALS:  (Developed with and agreed upon by patient and/or caregiver.)  1. Patient will complete lower body dressing with [facility]. 2. Patient will complete functional transfers with [facility] and no loss of balance. 3. Patient will complete in room mobility with [facility] and no loss of balance. GOALS MET     OCCUPATIONAL THERAPY ASSESSMENT: Initial Assessment, Daily Note, Discharge and AM OT Treatment Day # 1    Harrison Loya is a 57 y.o. female   PRIMARY DIAGNOSIS: Acute CVA (cerebrovascular accident) ([facility] [address]  Acute CVA (cerebrovascular accident) ([facility] [address] [I63.9]    Reason for Referral:  Generalized Weakness  ICD-10: Treatment Diagnosis: Generalized Muscle Weakness (M62.81)  OBSERVATION: Payor: [facility] CCN / Plan: Aloha Mavis / Product Type: [facility] Funded Programs /   ASSESSMENT:     REHAB RECOMMENDATIONS:   Recommendation to date pending progress:  Setting:   No further skilled therapy   Equipment:    None     PRIOR LEVEL OF FUNCTION:  (Prior to Hospitalization)  INITIAL/CURRENT LEVEL OF FUNCTION:  (Based on today's evaluation)   Bathing:   Independent  Dressing:   Independent  Feeding/Grooming:   Independent  Toileting:   Independent  Functional Mobility:   Independent Bathing:   Independent  Dressing:   Independent  Feeding/Grooming:   Independent  Toileting:   Independent  Functional Mobility:   Independent     ASSESSMENT:  Ms. Hector Castro was admitted for R sided facial weakness and CVA workup. Pt reports minimal numbness/tingling on R side of lip. RUE minimally decreased for strength with pt reporting that she has been experiencing chronic pain in that shoulder; LUE WFL. BUE sensation to light touch intact. Pt is Independent for bed mobility and Independent to complete sock management seated edge of bed. Pt initially requires Supervision to complete in room mobility but progresses to Independent.  Pt is Independent to brush teeth at sink and presents with no loss of balance. Pt appears to be functioning at baseline for performance of ADLs and functional mobility. At this time, pt is most appropriate to d/c from acute OT caseload with no additional OT needs. SUBJECTIVE:   Ms. Rambo Navarro states, \"I feel good and just want to get back to home. \"    SOCIAL HISTORY/LIVING ENVIRONMENT: Pt lives with  and two grandchildren in one level home with 3 MARITZA (R). Pt is independent for ADLs and functional mobility. Pt does report she has crutches due to increased R hip/knee pain but uses them seldom. Pt reports one fall that occurred over 6 months ago when tripping over something walking out of a store. Pt is very active, volunteers at her S[employer]ism, and is working on getting her Master's degree. [employer] . Home Environment: Private residence  # Steps to Enter: 3  One/Two Story Residence: One story  Living Alone: No  Support Systems: Family member(s)    OBJECTIVE:     PAIN: VITAL SIGNS: LINES/DRAINS:   Pre Treatment: Pain Screen  Pain Scale 1: Numeric (0 - 10)  Pain Intensity 1: 0  Post Treatment: Unchanged Vital Signs  Pulse (Heart Rate): 79 at rest.  HR observed to increase to 97 post-activity. None  O2 Device: None (Room air)     GROSS EVALUATION:  BUE Within Functional Limits Abnormal/ Functional Abnormal/ Non-Functional (see comments) Not Tested Comments:   AROM [x] [] [] []    PROM [] [] [] [x]    Strength [] [x] [] [] Decreased strength in RUE due to chronic pain.     Balance [x] [] [] []    Posture [x] [] [] []    Sensation [x] [] [] []    Coordination [x] [] [] []    Tone [x] [] [] []    Edema [x] [] [] []    Activity Tolerance [x] [] [] []     [] [] [] []      COGNITION/  PERCEPTION: Intact Impaired   (see comments) Comments:   Orientation [x] []    Vision [x] []    Hearing [x] []    Judgment/ Insight [x] []    Attention [x] []    Memory [x] []    Command Following [x] []    Emotional Regulation [x] []     [] []      ACTIVITIES OF DAILY LIVING: I Mod I S SBA CGA Min Mod Max Total NT Comments   BASIC ADLs:              Bathing/ Showering [] [] [] [] [] [] [] [] [] [x]    Toileting [] [] [] [] [] [] [] [] [] [x]    Dressing [x] [] [] [] [] [] [] [] [] [] Sock management. Feeding [] [] [] [] [] [] [] [] [] [x]    Grooming [x] [] [] [] [] [] [] [] [] [] To brush teeth at sink. Personal Device Care [] [] [] [] [] [] [] [] [] [x]    Functional Mobility [x] [] [] [] [] [] [] [] [] []    I=Independent, Mod I=Modified Independent, S=Supervision, SBA=Standby Assistance, CGA=Contact Guard Assistance,   Min=Minimal Assistance, Mod=Moderate Assistance, Max=Maximal Assistance, Total=Total Assistance, NT=Not Tested    MOBILITY: I Mod I S SBA CGA Min Mod Max Total  NT x2 Comments:   Supine to sit [x] [] [] [] [] [] [] [] [] [] []    Sit to supine [] [] [] [] [] [] [] [] [] [x] []    Sit to stand [x] [] [] [] [] [] [] [] [] [] []    Bed to chair [x] [] [] [] [] [] [] [] [] [] []    I=Independent, Mod I=Modified Independent, S=Supervision, SBA=Standby Assistance, CGA=Contact Guard Assistance,   Min=Minimal Assistance, Mod=Moderate Assistance, Max=Maximal Assistance, Total=Total Assistance, NT=Not [facility] 6 Clicks   Daily Activity Inpatient Short Form        How much help from another person does the patient currently need. .. Total A Lot A Little None   1. Putting on and taking off regular lower body clothing? [] 1   [] 2   [] 3   [x] 4   2. Bathing (including washing, rinsing, drying)? [] 1   [] 2   [] 3   [x] 4   3. Toileting, which includes using toilet, bedpan or urinal?   [] 1   [] 2   [] 3   [x] 4   4. Putting on and taking off regular upper body clothing? [] 1   [] 2   [] 3   [x] 4   5. Taking care of personal grooming such as brushing teeth? [] 1   [] 2   [] 3   [x] 4   6. Eating meals? [] 1   [] 2   [] 3   [x] 4   © 2007, Trustees of [address] [place] [address] [address] [id number], under license to [facility].  All rights reserved     Score:  Initial: 24, completed, 4/14/2021 Most Recent: X (Date: -- )   Interpretation of Tool:  Represents activities that are increasingly more difficult (i.e. Bed mobility, Transfers, Gait). PLAN:   FREQUENCY/DURATION: OT Plan of Care: (1x visit, treat, and d/c) for duration of hospital stay or until stated goals are met, whichever comes first.    PROBLEM LIST:   (Skilled intervention is medically necessary to address:)  1. Decreased Strength in RUE which per pt appears to be chronic. INTERVENTIONS PLANNED:   (Benefits and precautions of occupational therapy have been discussed with the patient.)  1. Education     TREATMENT:     EVALUATION: Low Complexity : (Untimed Charge)    TREATMENT:   Self Care (10 Minutes): Self care including Lower Body Dressing and Grooming to increase independence and decrease level of assistance required. TREATMENT GRID:  N/A    AFTER TREATMENT POSITION/PRECAUTIONS:  Chair, Needs within reach, RN notified and table tray adjacent to pt.     INTERDISCIPLINARY COLLABORATION:  RN/PCT and OT/LENNON    TOTAL TREATMENT DURATION:  OT Patient Time In/Time Out  Time In: 0927  Time Out: 0948    RUBI Urias/LATANYA

## 2021-04-14 NOTE — PROGRESS NOTES
Order received, chart reviewed. Attempted to see pt. This PM, but pt off the floor for MRI. Will follow up tomorrow.

## 2021-04-14 NOTE — PROGRESS NOTES
BSN, CM met with pt at bedside with appropriate PPE and social distancing. Pt sitting in bedside chair. Pt alert and oriented to person, place, time, and situation. Pt verified demographic information. PCP verified as Dr. Bailey Choudhury with [place] and reports never seeing Dr. Siena Vasquez in the past and pt was last seen by PCP last week. Pt lives in 1 story home with spouse and two grandchildren, 3 steps to enter into the home. Pt reports being independent with ADLs and driving prior to admission. Pt reports having DMEs such as glucometer. Pt family able to transport home, to doctor apt,  medications, and get groceries. [facility] Pt reports being able to afford medications. Pt reports plans to discharge home. Pt has never used HH or been to SNF. PT/OT ordered and will follow recommendations. CM to continue to follow and monitor for any needs that may occur. Care Management Interventions  PCP Verified by CM: Yes(Dr. Bailey Choudhury)  Mode of Transport at Discharge:  Other (see comment)(family )  Transition of Care Consult (CM Consult): Discharge Planning  Discharge Durable Medical Equipment: No  Physical Therapy Consult: Yes  Occupational Therapy Consult: Yes  Speech Therapy Consult: Yes  Current Support Network: Own Home, Lives with Spouse(Grand children live in home )  Confirm Follow Up Transport: Family  The Plan for Transition of Care is Related to the Following Treatment Goals : Return to baseline   Freedom of Choice List was Provided with Basic Dialogue that Supports the Patient's Individualized Plan of Care/Goals, Treatment Preferences and Shares the Quality Data Associated with the Providers?: Yes  The [employer] Information Provided?: Yes  Discharge Location  Discharge Placement: Home

## 2021-04-14 NOTE — PROGRESS NOTES
Progress Note    Patient: Harrison Loya MRN: [id number]  SSN: [id number]    YOB: 1960  Age: 57 y.o. Sex: female      Admit Date: 4/13/2021    LOS: 0 days     Subjective:     Seen awake alert is doing well complete resolution of numbness from yesterday    Objective:     Vitals:    04/14/21 0400 04/14/21 0425 04/14/21 0730 04/14/21 0927   BP:  126/78 110/62    Pulse: 96 96 96 79   Resp:  23 18    Temp:  98.4 °F (36.9 °C) 97.8 °F (36.6 °C)    SpO2:  96% 96%    Weight:       Height:          Review of systems  General reports normal energy. Sleep wake cycle appetite  ENT no sinus congestion no epistaxis no unilateral hearing loss no swallowing difficulty  Pulmonary-denies shortness of breath, no orthopnea, no wheezing, no productive sputum no hemoptysis  Cardiac denies chest pain palpitations peripheral edema  GI weight is stable appetite steady no constipation diarrhea abdominal pain  Joints no inflammation no hip pain or shoulder pain no inflammation. No new onset back pain  Skin no rash or ecchymosis  Neuro no syncope vertigo diplopia dysarthria dysphagia difficulty with balance or coordination unilateral weakness   no nocturia. Urinary control is normal.  Current sexual function no issues  Psychiatric no mood disorder no kenny no depression no outbursts or belligerent behavior          Physical Exam: The patient is alert cooperative and oriented. No evident skin lesions no evidence of excessive bruising no trauma  Patient looks well-hydrated. Does not appear chronically ill. Cranial nerve examination normal visual fields. Normal random eye movements. No ptosis.   No lid lag  Face moves strongly symmetrically and equally  Speech is normal  Motor  Upper extremities  Normal bulk strength tone and symmetric arm swing  Lower extremities  Normal bulk strength tone  Deep tendon reflexes 1+ and symmetric at biceps brachioradialis and triceps  Casual gait is normal with no evidence of ataxia  Fine motor coordination is normal in the hands bilaterally    Peripheral sensation light touch normal  There are no carotid bruits  Vital signs are reviewed      Lab/Data Review:  Recent Results (from the past 24 hour(s))   GLUCOSE, POC    Collection Time: 04/13/21  2:30 PM   Result Value Ref Range    Glucose (POC) 135 (H) 65 - 100 mg/dL    Performed by Brooke    POC PT/INR    Collection Time: 04/13/21  2:34 PM   Result Value Ref Range    Prothrombin time (POC) 12.6 (H) 9.6 - 11.6 SECS    INR (POC) 1.1 0.9 - 1.2     EKG, 12 LEAD, INITIAL    Collection Time: 04/13/21  2:37 PM   Result Value Ref Range    Ventricular Rate 110 BPM    Atrial Rate 110 BPM    P-R Interval 158 ms    QRS Duration 76 ms    Q-T Interval 330 ms    QTC Calculation (Bezet) 446 ms    Calculated P Axis 56 degrees    Calculated R Axis 22 degrees    Calculated T Axis 29 degrees    Diagnosis       Sinus tachycardia  Anterior infarct (cited on or before 13-APR-2021)  Abnormal ECG  When compared with ECG of 13-APR-2021 14:37,  No significant change was found  Confirmed by ST ASHIA MENDEZ MD (), CHANCE SOW ([id number]) on 4/14/2021 8:36:06 AM     CBC WITH AUTOMATED DIFF    Collection Time: 04/13/21  2:41 PM   Result Value Ref Range    WBC 10.7 4.3 - 11.1 K/uL    RBC 4.25 4.05 - 5.2 M/uL    HGB 11.7 11.7 - 15.4 g/dL    HCT 36.5 35.8 - 46.3 %    MCV 85.9 79.6 - 97.8 FL    MCH 27.5 26.1 - 32.9 PG    MCHC 32.1 31.4 - 35.0 g/dL    RDW 13.4 11.9 - 14.6 %    PLATELET 694 416 - 061 K/uL    MPV 10.2 9.4 - 12.3 FL    ABSOLUTE NRBC 0.00 0.0 - 0.2 K/uL    DF AUTOMATED      NEUTROPHILS 69 43 - 78 %    LYMPHOCYTES 24 13 - 44 %    MONOCYTES 5 4.0 - 12.0 %    EOSINOPHILS 1 0.5 - 7.8 %    BASOPHILS 1 0.0 - 2.0 %    IMMATURE GRANULOCYTES 0 0.0 - 5.0 %    ABS. NEUTROPHILS 7.4 1.7 - 8.2 K/UL    ABS. LYMPHOCYTES 2.5 0.5 - 4.6 K/UL    ABS. MONOCYTES 0.5 0.1 - 1.3 K/UL    ABS. EOSINOPHILS 0.1 0.0 - 0.8 K/UL    ABS. BASOPHILS 0.1 0.0 - 0.2 K/UL    ABS. IMM.  GRANS. 0.0 0.0 - 0.5 K/UL   METABOLIC PANEL, BASIC    Collection Time: 04/13/21  2:41 PM   Result Value Ref Range    Sodium 138 136 - 145 mmol/L    Potassium 4.1 3.5 - 5.1 mmol/L    Chloride 104 98 - 107 mmol/L    CO2 28 21 - 32 mmol/L    Anion gap 6 (L) 7 - 16 mmol/L    Glucose 132 (H) 65 - 100 mg/dL    BUN 11 8 - 23 MG/DL    Creatinine 0.98 0.6 - 1.0 MG/DL    GFR est AA >60 >60 ml/min/1.73m2    GFR est non-AA >60 >60 ml/min/1.73m2    Calcium 9.3 8.3 - 10.4 MG/DL   PROTHROMBIN TIME + INR    Collection Time: 04/13/21  2:41 PM   Result Value Ref Range    Prothrombin time 13.1 12.5 - 14.7 sec    INR 1.0     TROPONIN-HIGH SENSITIVITY    Collection Time: 04/13/21  2:41 PM   Result Value Ref Range    Troponin-High Sensitivity 9.2 0 - 14 pg/mL   TSH 3RD GENERATION    Collection Time: 04/13/21  2:41 PM   Result Value Ref Range    TSH <0.005 (L) 0.358 - 3.740 uIU/mL   FOLATE    Collection Time: 04/13/21  2:41 PM   Result Value Ref Range    Folate 34.3 (H) 3.1 - 17.5 ng/mL   VITAMIN B12    Collection Time: 04/13/21  2:41 PM   Result Value Ref Range    Vitamin B12 489 193 - 986 pg/mL   T4, FREE    Collection Time: 04/13/21  2:41 PM   Result Value Ref Range    T4, Free 1.9 (H) 0.9 - 1.8 NG/DL   URINE MICROSCOPIC    Collection Time: 04/13/21  5:11 PM   Result Value Ref Range    WBC 0-3 0 /hpf    RBC 0-3 0 /hpf    Epithelial cells 0-3 0 /hpf    Bacteria 0 0 /hpf    Casts 0 0 /lpf   CULTURE, BLOOD    Collection Time: 04/13/21  9:12 PM    Specimen: Blood   Result Value Ref Range    Special Requests: LEFT  Antecubital        Culture result: NO GROWTH AFTER 8 HOURS     LIPID PANEL    Collection Time: 04/14/21  4:38 AM   Result Value Ref Range    LIPID PROFILE          Cholesterol, total 157 <200 MG/DL    Triglyceride 147 35 - 150 MG/DL    HDL Cholesterol 31 (L) 40 - 60 MG/DL    LDL, calculated 96.6 <100 MG/DL    VLDL, calculated 29.4 (H) 6.0 - 23.0 MG/DL    CHOL/HDL Ratio 5.1     HEMOGLOBIN A1C WITH EAG    Collection Time: 04/14/21  4:38 AM Result Value Ref Range    Hemoglobin A1c 7.1 (H) 4.20 - 6.30 %    Est. average glucose 157 mg/dL   CBC W/O DIFF    Collection Time: 04/14/21  4:38 AM   Result Value Ref Range    WBC 9.4 4.3 - 11.1 K/uL    RBC 3.89 (L) 4.05 - 5.2 M/uL    HGB 10.8 (L) 11.7 - 15.4 g/dL    HCT 32.6 (L) 35.8 - 46.3 %    MCV 83.8 79.6 - 97.8 FL    MCH 27.8 26.1 - 32.9 PG    MCHC 33.1 31.4 - 35.0 g/dL    RDW 13.3 11.9 - 14.6 %    PLATELET 768 822 - 087 K/uL    MPV 10.1 9.4 - 12.3 FL    ABSOLUTE NRBC 0.00 0.0 - 0.2 K/uL   GLUCOSE, POC    Collection Time: 04/14/21  7:51 AM   Result Value Ref Range    Glucose (POC) 156 (H) 65 - 100 mg/dL    Performed by Tia           Assessment:     Principal Problem:    Acute CVA (cerebrovascular accident) ([facility] [address] (4/13/2021)    BenefitHas symptoms of resolved this is likely transient ischemic attack await MRI however in terms of whether acute stroke    Plan:     Full stroke prevention program as outlined yesterday  No objection to early discharge if MRI is satisfactory    Signed By: Annamarie Chavarria MD     April 14, 2021

## 2021-04-14 NOTE — ED NOTES
TRANSFER - OUT REPORT:    Verbal report given to Adela(name) on Liliana Arango  being transferred to 9th Floor(unit) for routine progression of care       Report consisted of patients Situation, Background, Assessment and   Recommendations(SBAR). Information from the following report(s) SBAR was reviewed with the receiving nurse. Lines:   Peripheral IV 04/13/21 Right Antecubital (Active)   Site Assessment Clean, dry, & intact 04/13/21 1433   Phlebitis Assessment 0 04/13/21 1433   Infiltration Assessment 0 04/13/21 1433   Dressing Status Clean, dry, & intact 04/13/21 1433   Dressing Type 4 X 4 04/13/21 1433   Hub Color/Line Status Pink 04/13/21 1433        Opportunity for questions and clarification was provided.       Patient transported with:   RN

## 2021-04-14 NOTE — PROGRESS NOTES
OBSERVATIONS STATUS  SPEECH LANGUAGE PATHOLOGY: DYSPHAGIA- Initial Assessment and Discharge    NAME/AGE/GENDER: Kira Wolf is a 57 y.o. female  DATE: 4/14/2021  PRIMARY DIAGNOSIS: Acute [facility]bro[facility] [I63.9]      ICD-10: Treatment Diagnosis: R13.12 Dysphagia, Oropharyngeal Phase    RECOMMENDATIONS   DIET:    Regular Consistency   Thin Liquids    MEDICATIONS: With liquid     ASPIRATION PRECAUTIONS  · Slow rate of intake  · Small bites/sips  · Upright at 90 degrees during meal     COMPENSATORY STRATEGIES/MODIFICATIONS  · None     RECOMMENDATIONS for CONTINUED SPEECH THERAPY:   YES: Anticipate need for ongoing speech therapy during this hospitalization. ASSESSMENT   Patient presents with oropharyngeal swallow function that is within normal limits. No s/sx of dysphagia identified. Recommend continue regular consistency diet/thin liquids. Meds with liquid rinse. No dysphagia goals established at this time. ADDENDUM 4/15/21: Per chart review, MRI negative for acute infarction. No further speech therapy needs at this time. EDUCATION:  · Recommendations discussed with Patient  CONTINUATION OF SKILLED SERVICES/MEDICAL NECESSITY:   Patient continues to require skilled intervention due to need for cognitive linguistic assessment if clinically indicated by stroke work up. Isiah CONNOR POTENTIAL FOR STATED GOALS: Excellent    PLAN    FREQUENCY/DURATION: Assessment of cognitive-linguistic abilities to be completed at next session. Frequency and duration to be determined by findings/recommendations.     - Recommendations for next treatment session: Next treatment session will address assessment of cognitive-linguistic abilities     SUBJECTIVE   Patient alert upright in bed for assessment. Pleasant and friendly. Denies any difficulty swallowing.      Oxygen Device: room air  Pain: Pain Scale 1: Numeric (0 - 10)  Pain Intensity 1: 0    History of Present Injury/Illness: Ms. Valeria Mondragon  has a past medical history of Diabetes ([facility] [address] Endocrine disease, Hypertension, Other ill-defined conditions(799.89), Other ill-defined conditions(799.89), Seizures ([facility] [address] and Sleep disorder. . She also  has a past surgical history that includes hx gyn; pr breast surgery procedure unlisted; pr abdomen surgery proc unlisted; hx other surgical; hx other surgical; hx orthopaedic; and hx breast reduction (Bilateral). PRECAUTIONS/ALLERGIES: Adhesive     Problem List:  (Impairments causing functional limitations):  1. Oropharyngeal dysphagia- No symptoms identified    Previous Dysphagia: NONE REPORTED  Diet Prior to Evaluation: Regular/thin    Orientation:  Person  Place  Time  Situation    Cognitive-Linguistic Screening:   Alertness  o Alert   Speech Production:   o Fully intelligible   Expressive Language:  o Fluent speech and Able to effectively communicate wants and needs   Receptive Language:  o Answers yes/no questions appropriately and Follows commands   Cognition:   o No deficits noted. Attends to task and answers all questions appropriately. Prior Level of Function: Lives at home. Does not work   Recommendations: Given results of screening, patient appears to be functioning at baseline. However imaging results are still pending. Will follow up for further assessment as indicated by findings. OBJECTIVE   Oral Motor:   · Labial: No impairment  · Dentition: Intact and Natural  · Oral Hygiene: Adequate  · Lingual: No impairment    Dysphagia evaluation:   Patient consumed trials of thin by cup/straw/serial sips, mixed, and solid consistency without overt s/sx airway compromise. Functional oral prep and clearance with all trials.      Tool Used: Dysphagia Outcome and Severity Scale (BRE)    Score Comments   Normal Diet  [x] 7 With no strategies or extra time needed   Functional Swallow  [] 6 May have mild oral or pharyngeal delay   Mild Dysphagia  [] 5 Which may require one diet consistency restricted    Mild-Moderate Dysphagia  [] 4 With 1-2 diet consistencies restricted   Moderate Dysphagia  [] 3 With 2 or more diet consistencies restricted   Moderate-Severe Dysphagia  [] 2 With partial PO strategies (trials with ST only)   Severe Dysphagia  [] 1 With inability to tolerate any PO safely      Score:  Initial: 7 Most Recent: x (Date 04/14/21 )   Interpretation of Tool: The Dysphagia Outcome and Severity Scale (BRE) is a simple, easy-to-use, 7-point scale developed to systematically rate the functional severity of dysphagia based on objective assessment and make recommendations for diet level, independence level, and type of nutrition. Current Medications:   No current facility-administered medications on file prior to encounter. Current Outpatient Medications on File Prior to Encounter   Medication Sig Dispense Refill    lisinopriL (PRINIVIL, ZESTRIL) 10 mg tablet Take 10 mg by mouth daily.  metoprolol tartrate (LOPRESSOR) 25 mg tablet Take  by mouth two (2) times a day. Pt unsure of mg      cholecalciferol (VITAMIN D3) (1000 Units /25 mcg) tablet Take 1,000 Units by mouth daily.  chlorthalidone (HYGROTON) 25 mg tablet Take  by mouth daily. Pt unsure of mg      insulin aspart U-100 (NovoLOG Flexpen U-100 Insulin) 100 unit/mL (3 mL) inpn 18 Units by SubCUTAneous route. Indications: type 1 diabetes mellitus      insulin glargine (LANTUS,BASAGLAR) 100 unit/mL (3 mL) inpn 65 Units by SubCUTAneous route nightly.  amLODIPine (NORVASC) 10 mg tablet Take  by mouth daily.  levothyroxine (SYNTHROID) 300 mcg tablet Take 175 mcg by mouth Daily (before breakfast).  methylPREDNISolone (Medrol, Que,) 4 mg tablet Take as directed. 1 Dose Pack 0    traMADol (ULTRAM) 50 mg tablet Take 1 Tab by mouth every six (6) hours as needed for Pain. Max Daily Amount: 200 mg. 30 Tab 0    metformin (GLUCOPHAGE) 500 mg tablet Take 500 mg by mouth daily (with breakfast).       cyanocobalamin (VITAMIN B-12) 1,000 mcg/mL injection 1,000 mcg by IntraMUSCular route every seven (7) days.  lisinopril-hydrochlorothiazide (PRINZIDE, ZESTORETIC) 10-12.5 mg per tablet Take 1 Tab by mouth daily.           INTERDISCIPLINARY COLLABORATION: RN    After treatment position/precautions:  · Upright in bed  · Call light within reach    Total Treatment Duration:   Time In: 3014  Time Out: [address] [facility] [address] [id number] [facility]

## 2021-04-14 NOTE — PROGRESS NOTES
[facility] Hospitalist Progress Note     Name:  Dao Amaro  Age:61 y.o. Sex:female   :  1960    MRN:  [id number]     Admit Date:  2021    Reason for Admission:  Acute CVA (cerebrovascular accident) [facility]) [I63.9]    Hospital Course/Interval history:     69-year-old right-hand-dominant -American female past medical history of diabetes mellitus type 2 with insulin dependence, hypertension and tachycardia, hypothyroidism, obesity who presented with c/o right sided facial numbness. CT head without acute findings but did show colloid cyst, 5mm focus of increased density at the region of the anterior third ventricle/foramen. CTA neg for large vessel occlusion. CODE S called in ED. NO TPA given as NIH was 1. Tele Neuro recommended ASA 81 mg and Brilinta given significant family hx. On statin.   Symptoms resolved. MRI, echo pending. A1C 7.1, LDL 96. Assessment & Plan     Strokelike symptoms/acute CVA  -Onset of symptoms  @ 1300 with fasting symptoms as right facial paresthesia  -Not a tpa candidate due to mild symptoms, NIH 1.  Not an interventional candidate due to mild symptoms NIH 1.  -CT head shows colloid cyst  5 mm focus of increased density at the region of the anterior third  ventricle/foramen  otherwise unremarkable CT head. CTA showed no large vessel occlusion.  -Neurology consulted and impression is multiple risk factors for acute CVA, and requested dual antiplatelet therapy including aspirin 81 mg, and Brilinta load and maintenance,   -Note she received the COVID-19 mode during her vaccination, her second dose was administered in the left arm yesterday  she has sore arm otherwise no systemic symptoms       Echo, MRI pending.   On ASA, Brilinta, statin.      Diabetes mellitus type 2, obesity  -With insulin dependence, she states she uses short acting insulin 18 units 3 times daily AC, and 65 units of long-acting insulin, she has a kimberly freestyle device and glucose sensor subcutaneously implanted in her left arm. She reports her last hemoglobin A1c was 6.1, she does have symptomatic infrequent episodes of hypoglycemia for which she treats herself with glucose tablets and or juice.  -For now hold basal and bolus insulin in favor of Point-of-care glucose with insulin sign scale management, consistent carbohydrate diet, obesity leading to complexity of medical care    4/14 A1C 7.1      Hypertension and tachycardia  -Note: She was recently started on metoprolol for tachycardia and was to be referred to a cardiac stress test by her PCP   -Holding antihypertensive medications amlodipine, chlorthalidone, lisinopril, metoprolol in the setting of acute CVA and permissive hypertension    4/14 BP stable     Hypothyroidism  4/14  TSH <0.005, T4 1.9. Will decrease synthroid. Will need recheck TSH, T4 in 6 weeks with PCP. Diet:  DIET DIABETIC WITH OPTIONS  DVT PPx: lovenox  Code status: Full Code  Disposition/Expected LOS: >2MN      Subjective (04/14/21):    4/14  Echo, MRI pending. Pt reports symptoms resolved. Denies CP, SOB, n/v/d, abd pain, ext weakness, slurred speech, dizziness, numbness/tingling. Review of Systems: 14 point review of systems is otherwise negative with the exception of the elements mentioned above.     Objective:     Patient Vitals for the past 24 hrs:   Temp Pulse Resp BP SpO2   04/14/21 0730 97.8 °F (36.6 °C) 96 18 110/62 96 %   04/14/21 0425 98.4 °F (36.9 °C) 96 23 126/78 96 %   04/14/21 0400 -- 96 -- -- --   04/14/21 0339 -- 99 -- -- --   04/14/21 0100 -- 92 -- 123/64 --   04/14/21 0046 98.3 °F (36.8 °C) 96 24 129/74 97 %   04/14/21 0000 -- 96 -- -- --   04/13/21 2352 -- 95 27 -- 93 %   04/13/21 2351 -- 95 -- 116/69 94 %   04/13/21 2333 -- (!) 105 -- -- 96 %   04/13/21 2325 -- (!) 104 -- (!) 114/55 95 %   04/13/21 2200 -- (!) 105 (!) 34 -- 95 %   04/13/21 2130 -- 97 (!) 36 -- 93 %   04/13/21 2100 -- (!) 104 27 -- 96 % 04/13/21 2059 -- 100 28 115/60 96 %   04/13/21 2030 -- (!) 105 (!) 31 -- 94 %   04/13/21 2009 -- 91 (!) 42 -- 95 %   04/13/21 2000 -- 92 24 -- 97 %   04/13/21 1922 -- (!) 109 28 112/68 96 %   04/13/21 1859 -- (!) 110 29 112/68 96 %   04/13/21 1816 -- (!) 107 (!) 32 130/63 (!) 84 %   04/13/21 1800 -- (!) 108 28 118/62 91 %   04/13/21 1745 -- (!) 101 27 118/62 99 %   04/13/21 1730 -- (!) 112 (!) 39 131/65 92 %   04/13/21 1716 -- (!) 112 (!) 34 124/63 98 %   04/13/21 1701 -- (!) 108 (!) 38 124/65 97 %   04/13/21 1630 -- (!) 104 22 117/64 96 %   04/13/21 1616 -- (!) 104 24 118/62 --   04/13/21 1600 -- (!) 104 26 127/63 94 %   04/13/21 1545 -- (!) 110 25 130/76 95 %   04/13/21 1530 -- (!) 113 22 124/63 96 %   04/13/21 1516 -- (!) 116 (!) 34 139/62 96 %   04/13/21 1500 -- (!) 116 27 127/67 99 %   04/13/21 1459 -- (!) 119 (!) 66 (!) 140/78 99 %   04/13/21 1431 99.2 °F (37.3 °C) (!) 115 18 120/75 97 %     Oxygen Therapy  O2 Sat (%): 96 % (04/14/21 0730)  Pulse via Oximetry: 93 beats per minute (04/13/21 2352)  O2 Device: None (Room air) (04/14/21 0100)    Body mass index is 37.76 kg/m². Physical Exam:   General:  No acute distress, speaking in full sentences, no use of accessory muscles   Lungs:  Clear to auscultation bilaterally. Resp even and nonabored   CV:  Regular rate and rhythm with normal S1 and S2 . No LE edema  Abdomen:  Soft, nontender, nondistended, normoactive bowel sounds   Extremities:  No cyanosis clubbing or edema   Neuro:   A&O x3. PERRLA. EOMs intact. Bilateral UE/LE strength 5/5 no drift. Coordination intact. Sensation intact. Facial expressions symmetrical.  Finger to nose intact.     Psych:  Normal affect     Data Review:  I have reviewed all labs, meds, and studies from the last 24 hours:    Labs:    Recent Results (from the past 24 hour(s))   GLUCOSE, POC    Collection Time: 04/13/21  2:30 PM   Result Value Ref Range    Glucose (POC) 135 (H) 65 - 100 mg/dL    Performed by Lan COSBY PT/INR    Collection Time: 04/13/21  2:34 PM   Result Value Ref Range    Prothrombin time (POC) 12.6 (H) 9.6 - 11.6 SECS    INR (POC) 1.1 0.9 - 1.2     EKG, 12 LEAD, INITIAL    Collection Time: 04/13/21  2:37 PM   Result Value Ref Range    Ventricular Rate 110 BPM    Atrial Rate 110 BPM    P-R Interval 158 ms    QRS Duration 76 ms    Q-T Interval 330 ms    QTC Calculation (Bezet) 446 ms    Calculated P Axis 56 degrees    Calculated R Axis 22 degrees    Calculated T Axis 29 degrees    Diagnosis       Sinus tachycardia  Anterior infarct (cited on or before 13-APR-2021)  Abnormal ECG  When compared with ECG of 13-APR-2021 14:37,  No significant change was found  Confirmed by ST ASHIA MENDEZ MD (), CHANCE SOW ([id number]) on 4/14/2021 8:36:06 AM     CBC WITH AUTOMATED DIFF    Collection Time: 04/13/21  2:41 PM   Result Value Ref Range    WBC 10.7 4.3 - 11.1 K/uL    RBC 4.25 4.05 - 5.2 M/uL    HGB 11.7 11.7 - 15.4 g/dL    HCT 36.5 35.8 - 46.3 %    MCV 85.9 79.6 - 97.8 FL    MCH 27.5 26.1 - 32.9 PG    MCHC 32.1 31.4 - 35.0 g/dL    RDW 13.4 11.9 - 14.6 %    PLATELET 922 966 - 062 K/uL    MPV 10.2 9.4 - 12.3 FL    ABSOLUTE NRBC 0.00 0.0 - 0.2 K/uL    DF AUTOMATED      NEUTROPHILS 69 43 - 78 %    LYMPHOCYTES 24 13 - 44 %    MONOCYTES 5 4.0 - 12.0 %    EOSINOPHILS 1 0.5 - 7.8 %    BASOPHILS 1 0.0 - 2.0 %    IMMATURE GRANULOCYTES 0 0.0 - 5.0 %    ABS. NEUTROPHILS 7.4 1.7 - 8.2 K/UL    ABS. LYMPHOCYTES 2.5 0.5 - 4.6 K/UL    ABS. MONOCYTES 0.5 0.1 - 1.3 K/UL    ABS. EOSINOPHILS 0.1 0.0 - 0.8 K/UL    ABS. BASOPHILS 0.1 0.0 - 0.2 K/UL    ABS. IMM.  GRANS. 0.0 0.0 - 0.5 K/UL   METABOLIC PANEL, BASIC    Collection Time: 04/13/21  2:41 PM   Result Value Ref Range    Sodium 138 136 - 145 mmol/L    Potassium 4.1 3.5 - 5.1 mmol/L    Chloride 104 98 - 107 mmol/L    CO2 28 21 - 32 mmol/L    Anion gap 6 (L) 7 - 16 mmol/L    Glucose 132 (H) 65 - 100 mg/dL    BUN 11 8 - 23 MG/DL    Creatinine 0.98 0.6 - 1.0 MG/DL    GFR est AA >60 >60 ml/min/1.73m2 GFR est non-AA >60 >60 ml/min/1.73m2    Calcium 9.3 8.3 - 10.4 MG/DL   PROTHROMBIN TIME + INR    Collection Time: 04/13/21  2:41 PM   Result Value Ref Range    Prothrombin time 13.1 12.5 - 14.7 sec    INR 1.0     TROPONIN-HIGH SENSITIVITY    Collection Time: 04/13/21  2:41 PM   Result Value Ref Range    Troponin-High Sensitivity 9.2 0 - 14 pg/mL   TSH 3RD GENERATION    Collection Time: 04/13/21  2:41 PM   Result Value Ref Range    TSH <0.005 (L) 0.358 - 3.740 uIU/mL   FOLATE    Collection Time: 04/13/21  2:41 PM   Result Value Ref Range    Folate 34.3 (H) 3.1 - 17.5 ng/mL   VITAMIN B12    Collection Time: 04/13/21  2:41 PM   Result Value Ref Range    Vitamin B12 489 193 - 986 pg/mL   T4, FREE    Collection Time: 04/13/21  2:41 PM   Result Value Ref Range    T4, Free 1.9 (H) 0.9 - 1.8 NG/DL   URINE MICROSCOPIC    Collection Time: 04/13/21  5:11 PM   Result Value Ref Range    WBC 0-3 0 /hpf    RBC 0-3 0 /hpf    Epithelial cells 0-3 0 /hpf    Bacteria 0 0 /hpf    Casts 0 0 /lpf   CULTURE, BLOOD    Collection Time: 04/13/21  9:12 PM    Specimen: Blood   Result Value Ref Range    Special Requests: LEFT  Antecubital        Culture result: NO GROWTH AFTER 8 HOURS     LIPID PANEL    Collection Time: 04/14/21  4:38 AM   Result Value Ref Range    LIPID PROFILE          Cholesterol, total 157 <200 MG/DL    Triglyceride 147 35 - 150 MG/DL    HDL Cholesterol 31 (L) 40 - 60 MG/DL    LDL, calculated 96.6 <100 MG/DL    VLDL, calculated 29.4 (H) 6.0 - 23.0 MG/DL    CHOL/HDL Ratio 5.1     HEMOGLOBIN A1C WITH EAG    Collection Time: 04/14/21  4:38 AM   Result Value Ref Range    Hemoglobin A1c 7.1 (H) 4.20 - 6.30 %    Est. average glucose 157 mg/dL   CBC W/O DIFF    Collection Time: 04/14/21  4:38 AM   Result Value Ref Range    WBC 9.4 4.3 - 11.1 K/uL    RBC 3.89 (L) 4.05 - 5.2 M/uL    HGB 10.8 (L) 11.7 - 15.4 g/dL    HCT 32.6 (L) 35.8 - 46.3 %    MCV 83.8 79.6 - 97.8 FL    MCH 27.8 26.1 - 32.9 PG    MCHC 33.1 31.4 - 35.0 g/dL    RDW 13.3 11.9 - 14.6 %    PLATELET 402 632 - 491 K/uL    MPV 10.1 9.4 - 12.3 FL    ABSOLUTE NRBC 0.00 0.0 - 0.2 K/uL   GLUCOSE, POC    Collection Time: 04/14/21  7:51 AM   Result Value Ref Range    Glucose (POC) 156 (H) 65 - 100 mg/dL    Performed by Tia        All Micro Results     Procedure Component Value Units Date/Time    CULTURE, BLOOD [[id number]] Collected: 04/13/21 2112    Order Status: Completed Specimen: Blood Updated: 04/14/21 0722     Special Requests: --        LEFT  Antecubital       Culture result: NO GROWTH AFTER 8 HOURS             EKG Results     Procedure 720 Value Units Date/Time    Initial ECG [[id number]] Collected: 04/13/21 1437    Order Status: Completed Updated: 04/14/21 0836     Ventricular Rate 110 BPM      Atrial Rate 110 BPM      P-R Interval 158 ms      QRS Duration 76 ms      Q-T Interval 330 ms      QTC Calculation (Bezet) 446 ms      Calculated P Axis 56 degrees      Calculated R Axis 22 degrees      Calculated T Axis 29 degrees      Diagnosis --     Sinus tachycardia  Anterior infarct (cited on or before 13-APR-2021)  Abnormal ECG  When compared with ECG of 13-APR-2021 14:37,  No significant change was found  Confirmed by ST ASHIA MENDEZ MD (), CHANCE SOW ([id number]) on 4/14/2021 8:36:06 AM            Other Studies:  Cta Code Neuro Head And Neck W Cont    Result Date: 4/13/2021  Title:  CT arteriogram of the neck and head. Indication: Right-sided facial numbness. Technique: Axial images of the neck and head were obtained after the uneventful administration of intravenous iodinated contrast media. Contrast was used to best identify the arterial structures.   Images were reviewed on a separate, free standing, three-dimensional workstation as per the referring physicians request.  All stenosis percentages derived by comparing the narrowest segment with the distal Internal Carotid Artery luminal diameter, as described in the [employer] Symptomatic Carotid Endarterectomy Trial (NASCET) criteria. The study was analyzed by the [employer]. ai algorithm. All CT scans at this facility are performed using dose reduction/dose modulation techniques, as appropriate the performed exam, including the following: Automated Exposure Control; Adjustment of the mA and/or kV according to patient size (this includes techniques or standardized protocols for targeted exams where dose is matched to indication/reason for exam); and Use of Iterative Reconstruction Technique. Comparison: None. Findings: Lungs: Normal Soft Tissues: Normal Cervical Spine: Degenerative changes Aorta: Bovine arch Great Vessels: Patent Right ICA: Patent % Stenosis: 0 Right MCA: Patent Right ENOC: Patent Left ICA: Patent % Stenosis: 0 Left MCA: Patent Left ENOC: Patent Right Vertebral: Patent Left Vertebral: Patent Dominance: Codominant Basilar: Patent Right PCA: Patent with a prominent right posterior communicating artery. Left PCA: Patent Other Vascular: Negative     No evidence of large vessel occlusion. Ct Code Neuro Head Wo Contrast    Result Date: 4/13/2021  CT head without contrast History: patient with acute neuro changes. Code stroke. Right-sided facial numbness beginning 1 hour ago Technique: 5mm axial images were obtained from the skull base to the vertex without intravenous contrast.  Radiation dose reduction techniques were used for this study:  Our CT scanners use one or all of the following: Automated exposure control, adjustment of the mA and/or kVp according to patient's size, iterative reconstruction. Comparison: None Findings: The ventricles and sulci are normal in size and configuration. There is a 5 mm focus of increased density at the region of the anterior third ventricle/foramen of 0 most compatible with a colloid cyst. There are no extra-axial fluid collections. There is no evidence to suggest an acute major territorial infarct. There is no evidence of acute intraparenchymal hemorrhage or mass effect.  The bony calvarium is intact. The visualized mastoid air cells and paranasal sinuses are well pneumatized and aerated. 1. Colloid cyst. 2. Otherwise unremarkable unenhanced CT scan of the brain.        Current Meds:   Current Facility-Administered Medications   Medication Dose Route Frequency    sodium chloride (NS) flush 5-40 mL  5-40 mL IntraVENous Q8H    sodium chloride (NS) flush 5-40 mL  5-40 mL IntraVENous PRN    aspirin chewable tablet 81 mg  81 mg Oral DAILY    atorvastatin (LIPITOR) tablet 40 mg  40 mg Oral QHS    acetaminophen (TYLENOL) tablet 650 mg  650 mg Oral Q4H PRN    labetaloL (NORMODYNE;TRANDATE) injection 5 mg  5 mg IntraVENous Q10MIN PRN    enoxaparin (LOVENOX) injection 40 mg  40 mg SubCUTAneous Q24H    dextrose 40% (GLUTOSE) oral gel 1 Tube  15 g Oral PRN    glucagon (GLUCAGEN) injection 1 mg  1 mg IntraMUSCular PRN    dextrose (D50W) injection syrg 12.5-25 g  25-50 mL IntraVENous PRN    insulin lispro (HUMALOG) injection   SubCUTAneous TIDAC    ticagrelor (BRILINTA) tablet 90 mg  90 mg Oral Q12H    levothyroxine (SYNTHROID) tablet 150 mcg  150 mcg Oral ACB       Problem List:  Hospital Problems as of 4/14/2021 Never Reviewed          Codes Class Noted - Resolved POA    * (Principal) Acute CVA (cerebrovascular accident) [facility]) ICD-10-CM: I63.9  ICD-9-CM: 434.91  4/13/2021 - Present Unknown               Notes, labs, VS, diagnostic testing reviewed  Case discussed with pt, care team, Dr. Mami Hawkins By: Jhonathan Zhou NP   [employer] [facility]    April 14, 2021  5:15 PM

## 2021-04-14 NOTE — CONSULTS
Juan Anand MD  Medical Director  [address] [place] [address], [address] [facility]  Tel: [phone]       Physical Medicine & Rehabilitation Consult    Subjective:     Date of Consultation:  April 14, 2021  Referring Physician: Dr Roge Whitmore is a 57 y.o. female who we are being asked to  evaluate at the request of the Hospitalist service for rehabilitation recommendations s/p admission under Code S    Per HPI; \"HPI: 79-year-old right-hand-dominant -American female past medical history of diabetes mellitus type 2 with insulin dependence, hypertension and tachycardia, hypothyroidism, obesity. She was volunteering at [employer] today they normally finish around 1 PM, she started to have heaviness sensation on the right side of her face that was subtle, she then drove home and called her son and explained her symptoms to him at that time, of concern he drove her to the emergency department for evaluation of radicular symptoms. 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 \"    CT neg.  CTA without LVO; MRI pending  4/14 Symptom Resolution  OT has evaluated and recommended no further therapies        Principal Problem:    Acute CVA (cerebrovascular accident) ([address] (4/13/2021)      Past Medical History:   Diagnosis Date    Diabetes ([address]     NIDDM    Endocrine disease     Hypertension     Other ill-defined conditions(799.89)     anemia    Other ill-defined conditions(799.89)     heart murmur    Seizures (HCC)     only after anesthesia    Sleep disorder     CPAP      Past Surgical History:   Procedure Laterality Date    ABDOMEN SURGERY PROC UNLISTED      exploratory    BREAST SURGERY PROCEDURE UNLISTED      benign cyst removed from breast    HX BREAST REDUCTION Bilateral     HX GYN      tubal lig / hyst    HX ORTHOPAEDIC      left knee    HX OTHER SURGICAL      breast reduction    HX OTHER SURGICAL      tonsillectomy / ad      Family History   Problem Relation Age of Onset    Breast Cancer Mother     Breast Cancer Sister       Social History     Tobacco Use    Smoking status: Never Smoker   Substance Use Topics    Alcohol use: No     Prior to Admission medications    Medication Sig Start Date End Date Taking? Authorizing Provider   lisinopriL (PRINIVIL, ZESTRIL) 10 mg tablet Take 10 mg by mouth daily. Yes Provider, Historical   metoprolol tartrate (LOPRESSOR) 25 mg tablet Take  by mouth two (2) times a day. Pt unsure of mg   Yes Provider, Historical   cholecalciferol (VITAMIN D3) (1000 Units /25 mcg) tablet Take 1,000 Units by mouth daily. Yes Provider, Historical   chlorthalidone (HYGROTON) 25 mg tablet Take  by mouth daily. Pt unsure of mg   Yes Provider, Historical   insulin aspart U-100 (NovoLOG Flexpen U-100 Insulin) 100 unit/mL (3 mL) inpn 18 Units by SubCUTAneous route. Indications: type 1 diabetes mellitus   Yes Provider, Historical   insulin glargine (LANTUS,BASAGLAR) 100 unit/mL (3 mL) inpn 65 Units by SubCUTAneous route nightly. Yes Provider, Historical   amLODIPine (NORVASC) 10 mg tablet Take  by mouth daily. Yes Other, MD [place]   levothyroxine (SYNTHROID) 300 mcg tablet Take 175 mcg by mouth Daily (before breakfast). Yes Other, MD [place]   methylPREDNISolone (Medrol, Que,) 4 mg tablet Take as directed. 1/3/21   Mohan Ocampo MD   traMADol (ULTRAM) 50 mg tablet Take 1 Tab by mouth every six (6) hours as needed for Pain. Max Daily Amount: 200 mg. 12/31/16   Shawna Serrano [place], [place]   metformin (GLUCOPHAGE) 500 mg tablet Take 500 mg by mouth daily (with breakfast). 6/21/11   Yfn, MD [place]   cyanocobalamin (VITAMIN B-12) 1,000 mcg/mL injection 1,000 mcg by IntraMUSCular route every seven (7) days.  6/21/11   Yfn, MD [place]   lisinopril-hydrochlorothiazide (Rosemraie Byrne) 10-12.5 mg per tablet Take 1 Tab by mouth daily. Other, MD [place]     Allergies   Allergen Reactions    Adhesive Other (comments)     \"burns \" skin          Objective:     Vitals:  Blood pressure 135/85, pulse 83, temperature 98.2 °F (36.8 °C), resp. rate 18, height 5' 4\" (1.626 m), weight 220 lb (99.8 kg), SpO2 95 %.   Temp (24hrs), Av.2 °F (36.8 °C), Min:97.8 °F (36.6 °C), Max:98.4 °F (36.9 °C)      Labs/Studies:  Recent Results (from the past 72 hour(s))   GLUCOSE, POC    Collection Time: 21  2:30 PM   Result Value Ref Range    Glucose (POC) 135 (H) 65 - 100 mg/dL    Performed by Brooke    POC PT/INR    Collection Time: 21  2:34 PM   Result Value Ref Range    Prothrombin time (POC) 12.6 (H) 9.6 - 11.6 SECS    INR (POC) 1.1 0.9 - 1.2     EKG, 12 LEAD, INITIAL    Collection Time: 21  2:37 PM   Result Value Ref Range    Ventricular Rate 110 BPM    Atrial Rate 110 BPM    P-R Interval 158 ms    QRS Duration 76 ms    Q-T Interval 330 ms    QTC Calculation (Bezet) 446 ms    Calculated P Axis 56 degrees    Calculated R Axis 22 degrees    Calculated T Axis 29 degrees    Diagnosis       Sinus tachycardia  Anterior infarct (cited on or before 2021)  Abnormal ECG  When compared with ECG of 2021 14:37,  No significant change was found  Confirmed by ST ASHIA MENDEZ MD (), CHANCE SOW ([id number]) on 2021 8:36:06 AM     CBC WITH AUTOMATED DIFF    Collection Time: 21  2:41 PM   Result Value Ref Range    WBC 10.7 4.3 - 11.1 K/uL    RBC 4.25 4.05 - 5.2 M/uL    HGB 11.7 11.7 - 15.4 g/dL    HCT 36.5 35.8 - 46.3 %    MCV 85.9 79.6 - 97.8 FL    MCH 27.5 26.1 - 32.9 PG    MCHC 32.1 31.4 - 35.0 g/dL    RDW 13.4 11.9 - 14.6 %    PLATELET 209 553 - 478 K/uL    MPV 10.2 9.4 - 12.3 FL    ABSOLUTE NRBC 0.00 0.0 - 0.2 K/uL    DF AUTOMATED      NEUTROPHILS 69 43 - 78 %    LYMPHOCYTES 24 13 - 44 %    MONOCYTES 5 4.0 - 12.0 %    EOSINOPHILS 1 0.5 - 7.8 %    BASOPHILS 1 0.0 - 2.0 %    IMMATURE GRANULOCYTES 0 0.0 - 5.0 %    ABS. NEUTROPHILS 7.4 1.7 - 8.2 K/UL    ABS. LYMPHOCYTES 2.5 0.5 - 4.6 K/UL    ABS. MONOCYTES 0.5 0.1 - 1.3 K/UL    ABS. EOSINOPHILS 0.1 0.0 - 0.8 K/UL    ABS. BASOPHILS 0.1 0.0 - 0.2 K/UL    ABS. IMM.  GRANS. 0.0 0.0 - 0.5 K/UL   METABOLIC PANEL, BASIC    Collection Time: 04/13/21  2:41 PM   Result Value Ref Range    Sodium 138 136 - 145 mmol/L    Potassium 4.1 3.5 - 5.1 mmol/L    Chloride 104 98 - 107 mmol/L    CO2 28 21 - 32 mmol/L    Anion gap 6 (L) 7 - 16 mmol/L    Glucose 132 (H) 65 - 100 mg/dL    BUN 11 8 - 23 MG/DL    Creatinine 0.98 0.6 - 1.0 MG/DL    GFR est AA >60 >60 ml/min/1.73m2    GFR est non-AA >60 >60 ml/min/1.73m2    Calcium 9.3 8.3 - 10.4 MG/DL   PROTHROMBIN TIME + INR    Collection Time: 04/13/21  2:41 PM   Result Value Ref Range    Prothrombin time 13.1 12.5 - 14.7 sec    INR 1.0     TROPONIN-HIGH SENSITIVITY    Collection Time: 04/13/21  2:41 PM   Result Value Ref Range    Troponin-High Sensitivity 9.2 0 - 14 pg/mL   TSH 3RD GENERATION    Collection Time: 04/13/21  2:41 PM   Result Value Ref Range    TSH <0.005 (L) 0.358 - 3.740 uIU/mL   FOLATE    Collection Time: 04/13/21  2:41 PM   Result Value Ref Range    Folate 34.3 (H) 3.1 - 17.5 ng/mL   VITAMIN B12    Collection Time: 04/13/21  2:41 PM   Result Value Ref Range    Vitamin B12 489 193 - 986 pg/mL   T4, FREE    Collection Time: 04/13/21  2:41 PM   Result Value Ref Range    T4, Free 1.9 (H) 0.9 - 1.8 NG/DL   URINE MICROSCOPIC    Collection Time: 04/13/21  5:11 PM   Result Value Ref Range    WBC 0-3 0 /hpf    RBC 0-3 0 /hpf    Epithelial cells 0-3 0 /hpf    Bacteria 0 0 /hpf    Casts 0 0 /lpf   CULTURE, BLOOD    Collection Time: 04/13/21  9:12 PM    Specimen: Blood   Result Value Ref Range    Special Requests: LEFT  Antecubital        Culture result: NO GROWTH AFTER 8 HOURS     LIPID PANEL    Collection Time: 04/14/21  4:38 AM   Result Value Ref Range    LIPID PROFILE          Cholesterol, total 157 <200 MG/DL Triglyceride 147 35 - 150 MG/DL    HDL Cholesterol 31 (L) 40 - 60 MG/DL    LDL, calculated 96.6 <100 MG/DL    VLDL, calculated 29.4 (H) 6.0 - 23.0 MG/DL    CHOL/HDL Ratio 5.1     HEMOGLOBIN A1C WITH EAG    Collection Time: 04/14/21  4:38 AM   Result Value Ref Range    Hemoglobin A1c 7.1 (H) 4.20 - 6.30 %    Est. average glucose 157 mg/dL   CBC W/O DIFF    Collection Time: 04/14/21  4:38 AM   Result Value Ref Range    WBC 9.4 4.3 - 11.1 K/uL    RBC 3.89 (L) 4.05 - 5.2 M/uL    HGB 10.8 (L) 11.7 - 15.4 g/dL    HCT 32.6 (L) 35.8 - 46.3 %    MCV 83.8 79.6 - 97.8 FL    MCH 27.8 26.1 - 32.9 PG    MCHC 33.1 31.4 - 35.0 g/dL    RDW 13.3 11.9 - 14.6 %    PLATELET 030 901 - 032 K/uL    MPV 10.1 9.4 - 12.3 FL    ABSOLUTE NRBC 0.00 0.0 - 0.2 K/uL   GLUCOSE, POC    Collection Time: 04/14/21  7:51 AM   Result Value Ref Range    Glucose (POC) 156 (H) 65 - 100 mg/dL    Performed by Tia    GLUCOSE, POC    Collection Time: 04/14/21 11:53 AM   Result Value Ref Range    Glucose (POC) 110 (H) 65 - 100 mg/dL    Performed by Tia        Functional Assessment:  Functional Assessment  Baseline Functional Status: Ambulated independently  Fall in Past 12 Months: No  Decline in Gait/Transfer/Balance: No  Decline in Capacity to Feed/Dress/Bathe: No  Developmental Delay: No  Chewing/Swallowing Problems: No  Difficulty with Secretions: No  Speech Slurred/Thick/Garbled: No     Clemons Score:        Speech Assessment:  Dysphagia Screening  Vocal Quality/Secretions: Normal  History of Dysphagia: No  O2 Saturation: Normal  Alertness: Normal  Pre-Swallow Assessment Score: 0  Purees: No difficulty noted  Water by Cup: No difficulty noted  Water by Straw: No difficulty noted                Ambulation:  Activity and Safety  Activity Level: Up ad catia  Ambulate: Ambulate to bathroom  Activity: In bed, Resting quietly  Activity Assistance: No assistance needed  Weight Bearing Status: WBAT (Weight Bearing as Tolerated)  Mode of Transportation: Wheelchair, IV equipment  Repositioned: Head of bed elevated (degrees)  Patient Turned: Turns self  Head of Bed Elevated: Self regulated  Activity Response: Tolerated well  Safety Measures: Bed/Chair-Wheels locked, Bed in low position, Call light within reach, Gripper socks     Impression / Assessment:     Principal Problem:    Acute CVA (cerebrovascular accident) ([facility] (4/13/2021)     TIA    Plan / Recommendations / Medical Decision Making:     Recommendations:   Continue Acute Rehab Program  Coordination of rehab / medical care  Counseling of PM&R care issues management  Monitoring and management of medical conditions per plan of care / orders  -no inpt rehab needs  -deficits resolved  -Stroke Education and secondary prevention; bp mgt, diabetic control, wt loss, mgt of LINDA, exercise  -EHR review. No charge  Reviewed Therapies / Osvaldo Castro / Pete Mohan / Records    Thank you very much for this referral. We appreciate the opportunity to participate in this patient's care.

## 2021-04-15 VITALS
RESPIRATION RATE: 17 BRPM | TEMPERATURE: 98 F | WEIGHT: 220 LBS | HEART RATE: 101 BPM | DIASTOLIC BLOOD PRESSURE: 73 MMHG | HEIGHT: 64 IN | OXYGEN SATURATION: 98 % | SYSTOLIC BLOOD PRESSURE: 118 MMHG | BODY MASS INDEX: 37.56 KG/M2

## 2021-04-15 LAB
GLUCOSE BLD STRIP.AUTO-MCNC: 125 MG/DL (ref 65–100)
GLUCOSE BLD STRIP.AUTO-MCNC: 133 MG/DL (ref 65–100)
GLUCOSE BLD STRIP.AUTO-MCNC: 173 MG/DL (ref 65–100)
SERVICE CMNT-IMP: ABNORMAL

## 2021-04-15 PROCEDURE — 82962 GLUCOSE BLOOD TEST: CPT

## 2021-04-15 PROCEDURE — 97161 PT EVAL LOW COMPLEX 20 MIN: CPT | Performed by: PHYSICAL THERAPIST

## 2021-04-15 PROCEDURE — 99218 HC RM OBSERVATION: CPT

## 2021-04-15 PROCEDURE — 74011250637 HC RX REV CODE- 250/637: Performed by: NURSE PRACTITIONER

## 2021-04-15 PROCEDURE — 97110 THERAPEUTIC EXERCISES: CPT | Performed by: PHYSICAL THERAPIST

## 2021-04-15 PROCEDURE — C8929 TTE W OR WO FOL WCON,DOPPLER: HCPCS

## 2021-04-15 PROCEDURE — 74011250637 HC RX REV CODE- 250/637: Performed by: HOSPITALIST

## 2021-04-15 PROCEDURE — 74011000250 HC RX REV CODE- 250: Performed by: FAMILY MEDICINE

## 2021-04-15 PROCEDURE — 74011250636 HC RX REV CODE- 250/636: Performed by: FAMILY MEDICINE

## 2021-04-15 PROCEDURE — 74011636637 HC RX REV CODE- 636/637: Performed by: HOSPITALIST

## 2021-04-15 RX ORDER — ATORVASTATIN CALCIUM 40 MG/1
40 TABLET, FILM COATED ORAL
Qty: 30 TAB | Refills: 0 | Status: SHIPPED | OUTPATIENT
Start: 2021-04-15

## 2021-04-15 RX ORDER — LEVOTHYROXINE SODIUM 100 UG/1
100 TABLET ORAL
Qty: 30 TAB | Refills: 0 | Status: SHIPPED | OUTPATIENT
Start: 2021-04-16

## 2021-04-15 RX ORDER — GUAIFENESIN 100 MG/5ML
81 LIQUID (ML) ORAL DAILY
Qty: 30 TAB | Refills: 0 | Status: SHIPPED | OUTPATIENT
Start: 2021-04-15

## 2021-04-15 RX ADMIN — INSULIN LISPRO 2 UNITS: 100 INJECTION, SOLUTION INTRAVENOUS; SUBCUTANEOUS at 12:16

## 2021-04-15 RX ADMIN — Medication 10 ML: at 05:59

## 2021-04-15 RX ADMIN — TICAGRELOR 90 MG: 90 TABLET ORAL at 05:59

## 2021-04-15 RX ADMIN — LEVOTHYROXINE SODIUM 100 MCG: 0.1 TABLET ORAL at 05:59

## 2021-04-15 RX ADMIN — Medication 5 ML: at 15:32

## 2021-04-15 RX ADMIN — PERFLUTREN 1 ML: 6.52 INJECTION, SUSPENSION INTRAVENOUS at 14:20

## 2021-04-15 RX ADMIN — ASPIRIN 81 MG: 81 TABLET, CHEWABLE ORAL at 08:09

## 2021-04-15 NOTE — PROGRESS NOTES
Pt to d/c today. PT/OT recommended no skilled needs. Family to transport pt home. No needs identified. CM available if any needs arise. Care Management Interventions  PCP Verified by CM: Yes(Dr. Kristy Kayser)  Mode of Transport at Discharge:  Other (see comment)(family )  Transition of Care Consult (CM Consult): Discharge Planning  Discharge Durable Medical Equipment: No  Physical Therapy Consult: Yes  Occupational Therapy Consult: Yes  Speech Therapy Consult: Yes  Current Support Network: Own Home, Lives with Spouse(Grand children live in home )  Confirm Follow Up Transport: Family  The Plan for Transition of Care is Related to the Following Treatment Goals : Return to baseline   Freedom of Choice List was Provided with Basic Dialogue that Supports the Patient's Individualized Plan of Care/Goals, Treatment Preferences and Shares the Quality Data Associated with the Providers?: Yes  The [employer] Information Provided?: Yes  Discharge Location  Discharge Placement: Home

## 2021-04-15 NOTE — PROGRESS NOTES
Pt had an uneventful night. NIH score remained 0. Needs met. Pt resting in low locked position bed, side rails x2, call light and personal items in reach.

## 2021-04-15 NOTE — ACP (ADVANCE CARE PLANNING)
Power of  for Healthcare  Follow-up visit with Ms. Sarah Marcum to assist with [address]. Spoke with nurse to ensure that patient was sufficiently alert and oriented to proceed with consult. Reviewed information with Ms. Sarah Marcum and she completed an Advance Medical Directive. Original returned to patient and copy provided to unit staff to have scanned into the medical record. Rev.  Montse Sher MDiv, BS  Board Certified

## 2021-04-15 NOTE — DISCHARGE SUMMARY
[facility] Hospitalist Discharge Summary     Name:  Cody Atkinson    Age:61 y.o. Sex:female  :  1960       MRN:  [id number]       Admitting Physician: Akshat Lloyd MD Admit Date: 2021  2:43 PM   Attending Physician: Kevin Murphy MD  Primary Care Physician: Reagan Moore, DO       Discharge Physician: Avila Rodney NP  Discharge date: 04/15/21   Discharged Condition: Stable    Indication for Admission:   Chief Complaint   Patient presents with    Numbness        Reasons for hospitalization:  Hospital Problems as of 4/15/2021 Never Reviewed          Codes Class Noted - Resolved POA    * (Principal) Acute CVA (cerebrovascular accident) [facility]) ICD-10-CM: I63.9  ICD-9-CM: 434.91  2021 - Present Unknown                 Discharge Diagnosis: TIA  Did Patient have Sepsis (YES OR NO): NO      Hospital Course/Interval history:  57year-old right-hand-dominant -American female past medical history of diabetes mellitus type 2 with insulin dependence, hypertension and tachycardia, hypothyroidism, obesity who presented with c/o right sided facial numbness. CT head without acute findings but did show colloid cyst, 5mm focus of increased density at the region of the anterior third ventricle/foramen. CTA neg for large vessel occlusion. CODE S called in ED. NO TPA given as NIH was 1. Tele Neuro recommended ASA 81 mg and Brilinta given significant family hx. On statin. MRI neg for acute findings, showed hyperintensities which could be normal.   Spoke to Neurology and they recommend ASA 81 mg and brilinta X21 days then monotherapy with ASA 81 mg. Symptoms resolved.       Assessment/Plan:    Strokelike symptoms/acute CVA  -Onset of symptoms  @ 1300 with fasting symptoms as right facial paresthesia  -Not a tpa candidate due to mild symptoms, NIH 1.  Not an interventional candidate due to mild symptoms NIH 1.  -CT head shows colloid cyst  5 mm focus of increased density at the region of the anterior third  ventricle/foramen  otherwise unremarkable CT head.  CTA showed no large vessel occlusion.  -Neurology consulted and impression is multiple risk factors for acute CVA, and requested dual antiplatelet therapy including aspirin 81 mg, and Brilinta load and maintenance,   -Note she received the COVID-19 mode during her vaccination, her second dose was administered in the left arm yesterday 04/12 she has sore arm otherwise no systemic symptoms      4/14  Echo, MRI pending. On ASA, Brilinta, statin. 4/15 MRI with no acute findings. Neurology recommends ASA 81 mg and Brilinta X21 days then monotherapy with ASA 81 mg.  DC with Statin. Pt wanting to DC. Delay in echo. Will have PCP follow echo report given neg MRI for CVA pt ok to DC with echo report pending.       Diabetes mellitus type 2, obesity  -With insulin dependence, she states she uses short acting insulin 18 units 3 times daily AC, and 65 units of long-acting insulin, she has a kimberly freestyle device and glucose sensor subcutaneously implanted in her left arm.  She reports her last hemoglobin A1c was 6.1, she does have symptomatic infrequent episodes of hypoglycemia for which she treats herself with glucose tablets and or juice.  -For now hold basal and bolus insulin in favor of Point-of-care glucose with insulin sign scale management, consistent carbohydrate diet, obesity leading to complexity of medical care     4/14 A1C 7.1      Hypertension and tachycardia  -Note: She was recently started on metoprolol for tachycardia and was to be referred to a cardiac stress test by her PCP   -Holding antihypertensive medications amlodipine, chlorthalidone, lisinopril, metoprolol in the setting of acute CVA and permissive hypertension     4/14 BP stable    4/15 will continue to hold home anti-hypertensives on DC as pt has been normotensive. Will need f/u with PCP to assess need to reinstate.       Hypothyroidism  4/14  TSH <0.005, T4 1.9.   Will decrease synthroid. Will need recheck TSH, T4 in 6 weeks with PCP.         Consults:   IP CONSULT TO NEUROLOGY  IP CONSULT TO PHYSIATRIST(REHAB MEDICINE)     Disposition:   Diet:   DIET DIABETIC WITH OPTIONS  Code Status: Full Code      Follow up labs/imaging  Follow up with PCP  in 1 week    Pending labs/studies    Current Discharge Medication List      START taking these medications    Details   aspirin 81 mg chewable tablet Take 1 Tab by mouth daily. Qty: 30 Tab, Refills: 0      ticagrelor (BRILINTA) 90 mg tablet Take 1 Tab by mouth every twelve (12) hours every twelve (12) hours for 19 days. Qty: 38 Tab, Refills: 0      atorvastatin (LIPITOR) 40 mg tablet Take 1 Tab by mouth nightly. Qty: 30 Tab, Refills: 0         CONTINUE these medications which have CHANGED    Details   levothyroxine (SYNTHROID) 100 mcg tablet Take 1 Tab by mouth Daily (before breakfast). Qty: 30 Tab, Refills: 0         CONTINUE these medications which have NOT CHANGED    Details   cholecalciferol (VITAMIN D3) (1000 Units /25 mcg) tablet Take 1,000 Units by mouth daily. insulin aspart U-100 (NovoLOG Flexpen U-100 Insulin) 100 unit/mL (3 mL) inpn 18 Units by SubCUTAneous route. Indications: type 1 diabetes mellitus      insulin glargine (LANTUS,BASAGLAR) 100 unit/mL (3 mL) inpn 65 Units by SubCUTAneous route nightly. traMADol (ULTRAM) 50 mg tablet Take 1 Tab by mouth every six (6) hours as needed for Pain. Max Daily Amount: 200 mg. Qty: 30 Tab, Refills: 0      metformin (GLUCOPHAGE) 500 mg tablet Take 500 mg by mouth daily (with breakfast). cyanocobalamin (VITAMIN B-12) 1,000 mcg/mL injection 1,000 mcg by IntraMUSCular route every seven (7) days.          STOP taking these medications       lisinopriL (PRINIVIL, ZESTRIL) 10 mg tablet Comments:   Reason for Stopping:         metoprolol tartrate (LOPRESSOR) 25 mg tablet Comments:   Reason for Stopping:         chlorthalidone (HYGROTON) 25 mg tablet Comments:   Reason for Stopping:         amLODIPine (NORVASC) 10 mg tablet Comments:   Reason for Stopping:         methylPREDNISolone (Medrol, Que,) 4 mg tablet Comments:   Reason for Stopping:         lisinopril-hydrochlorothiazide (PRINZIDE, ZESTORETIC) 10-12.5 mg per tablet Comments:   Reason for Stopping:               Medications Discontinued During This Encounter   Medication Reason    lactated Ringers infusion     levothyroxine (SYNTHROID) tablet 150 mcg          Follow Up Orders: Follow-up Appointments   Procedures    FOLLOW UP VISIT Appointment in: Other (Specify)     Standing Status:   Standing     Number of Occurrences:   1     Order Specific Question:   Appointment in     Answer: Other (Specify)         Follow-up Information     Follow up With Specialties Details Why Contact Info    Jasiel Birmingham, DO Family Medicine In 1 week  [address] [facility]  [phone]              Discharge Exam:    Patient Vitals for the past 24 hrs:   Temp Pulse Resp BP SpO2   04/15/21 0831 98 °F (36.7 °C) 94 17 128/82 97 %   04/15/21 0400 -- 90 -- -- --   04/15/21 0255 98.6 °F (37 °C) 86 18 110/72 96 %   04/15/21 0000 -- (!) 117 -- -- --   04/14/21 2301 97.9 °F (36.6 °C) 90 18 116/79 96 %   04/14/21 2000 -- 89 -- -- --   04/14/21 1907 98.8 °F (37.1 °C) 93 20 120/74 98 %   04/14/21 1639 98 °F (36.7 °C) 89 -- 110/64 97 %   04/14/21 1600 -- 89 -- -- --   04/14/21 1215 98.2 °F (36.8 °C) 83 -- 135/85 95 %   04/14/21 1200 -- 83 -- -- --     Oxygen Therapy  O2 Sat (%): 97 % (04/15/21 0831)  Pulse via Oximetry: 93 beats per minute (04/13/21 2352)  O2 Device: None (Room air) (04/15/21 0210)    Estimated body mass index is 37.76 kg/m² as calculated from the following:    Height as of this encounter: 5' 4\" (1.626 m). Weight as of this encounter: 99.8 kg (220 lb).     No intake or output data in the 24 hours ending 04/15/21 0943    *Note that automatically entered I/Os may not be accurate; dependent on patient compliance with collection and accurate  by assistants. Physical Exam:   General:          No acute distress, speaking in full sentences, no use of accessory muscles   Lungs:             Clear to auscultation bilaterally. Resp even and nonabored   CV:                  Regular rate and rhythm with normal S1 and S2 . No LE edema  Abdomen:        Soft, nontender, nondistended, normoactive bowel sounds   Extremities:     No cyanosis clubbing or edema   Neuro:              A&O x3. PERRLA. EOMs intact. Bilateral UE/LE strength 5/5 no drift. Coordination intact. Sensation intact. Facial expressions symmetrical.  Finger to nose intact.     Psych:             Normal affect   All Labs from Last 24 Hrs:  Recent Results (from the past 24 hour(s))   GLUCOSE, POC    Collection Time: 04/14/21 11:53 AM   Result Value Ref Range    Glucose (POC) 110 (H) 65 - 100 mg/dL    Performed by Tia    GLUCOSE, POC    Collection Time: 04/14/21  4:45 PM   Result Value Ref Range    Glucose (POC) 153 (H) 65 - 100 mg/dL    Performed by Tia    GLUCOSE, POC    Collection Time: 04/14/21  9:15 PM   Result Value Ref Range    Glucose (POC) 196 (H) 65 - 100 mg/dL    Performed by Ok    GLUCOSE, POC    Collection Time: 04/15/21  6:41 AM   Result Value Ref Range    Glucose (POC) 125 (H) 65 - 100 mg/dL    Performed by Jhon Javier        All Micro Results     Procedure Component Value Units Date/Time    CULTURE, BLOOD [[id number]] Collected: 04/13/21 2112    Order Status: Completed Specimen: Blood Updated: 04/14/21 0722     Special Requests: --        LEFT  Antecubital       Culture result: NO GROWTH AFTER 8 HOURS             SARS-CoV-2 Lab Results  \"Novel Coronavirus\" Test: No results found for: COV2NT   \"Emergent Disease\" Test: No results found for: EDPR  \"SARS-COV-2\" Test: No results found for: XGCOVT  Rapid Test:   No results found for: COVR         Diagnostic Imaging/Tests:   Mri Brain Wo Cont    Result Date: 4/14/2021  1. No evidence of acute infarction 2. Scattered foci of T2 hyperintensity within the supratentorial white matter. Please see above. Cta Code Neuro Head And Neck W Cont    Result Date: 4/13/2021  No evidence of large vessel occlusion. Ct Code Neuro Head Wo Contrast    Result Date: 4/13/2021  1. Colloid cyst. 2. Otherwise unremarkable unenhanced CT scan of the brain. Echocardiogram/EKG results:  No results found for this visit on 04/13/21. EKG Results     Procedure 720 Value Units Date/Time    Initial ECG [[id number]] Collected: 04/13/21 1437    Order Status: Completed Updated: 04/14/21 0836     Ventricular Rate 110 BPM      Atrial Rate 110 BPM      P-R Interval 158 ms      QRS Duration 76 ms      Q-T Interval 330 ms      QTC Calculation (Bezet) 446 ms      Calculated P Axis 56 degrees      Calculated R Axis 22 degrees      Calculated T Axis 29 degrees      Diagnosis --     Sinus tachycardia  Anterior infarct (cited on or before 13-APR-2021)  Abnormal ECG  When compared with ECG of 13-APR-2021 14:37,  No significant change was found  Confirmed by ST ASHIA MENDEZ MD ()CHANCE ([id number]) on 4/14/2021 8:36:06 AM            Results for orders placed or performed during the hospital encounter of 04/13/21   EKG, 12 LEAD, INITIAL   Result Value Ref Range    Ventricular Rate 110 BPM    Atrial Rate 110 BPM    P-R Interval 158 ms    QRS Duration 76 ms    Q-T Interval 330 ms    QTC Calculation (Bezet) 446 ms    Calculated P Axis 56 degrees    Calculated R Axis 22 degrees    Calculated T Axis 29 degrees    Diagnosis       Sinus tachycardia  Anterior infarct (cited on or before 13-APR-2021)  Abnormal ECG  When compared with ECG of 13-APR-2021 14:37,  No significant change was found  Confirmed by ST ASIHA MENDEZ MD ()CHANCE ([id number]) on 4/14/2021 8:36:06 AM         Procedures done this admission:  * No surgery found *        Time spent in patient discharge planning and coordination 45 minutes.     Notes, labs, VS, diagnostic testing reviewed.    Case discussed with pt, care team, Dr. Gisela Remy By: Avila Rodney NP   [address] [place] [address] Service    April 15, 2021  9:43 AM

## 2021-04-15 NOTE — DISCHARGE INSTRUCTIONS
Stroke: After Your Visit     Your Care Instructions     You have had a stroke. Risk factors for stroke include being overweight, smoking, and sedentary lifestyle. This means that the blood flow to a part of your brain was blocked for some time, which damages the nerve cells in that part of the brain. The part of your body controlled by that part of your brain may not function properly now. The brain is an amazing organ that can heal itself to some degree. The stroke you had damaged part of your brain, but other parts of your brain may take over in some way for the damaged areas. You have already started this process. Going home may be hard for you and your family. The more you can try to do for yourself, the better. Remember to take each day one at a time. Follow-up care is a key part of your treatment and safety. Be sure to make and go to all appointments, and call your doctor if you are having problems. Its also a good idea to know your test results and keep a list of the medicines you take. How can you care for yourself at home? Enter a stroke rehabilitation (rehab) program, if your doctor recommends it. Physical, speech, and occupational therapies can help you manage bathing, dressing, eating, and other basics of daily living. Eat a heart-healthy diet that is low in cholesterol, saturated fat, and salt. Eat lots of fresh fruits and vegetables and foods high in fiber. Increase your activities slowly. Take short rest breaks when you get tired. Gradually increase the amount you walk. Start out by walking a little more than you did the day before. Do not drive until your doctor says it is okay. It is normal to feel sad or depressed after a stroke. If the blues last, talk to your doctor. If you are having problems with urine leakage, go to the bathroom at regular times, including when you first wake up and at bedtime. Also, limit fluids after dinner.   If you are constipated, drink plenty of fluids, enough so that your urine is light yellow or clear like water. If you have kidney, heart, or liver disease and have to limit fluids, talk with your doctor before you increase the amount of fluids you drink. Set up a regular time for using the toilet. If you continue to have constipation, your doctor may suggest using a bulking agent, such as Metamucil, or a stool softener, laxative, or enema. Medicines  Take your medicines exactly as prescribed. Call your doctor if you think you are having a problem with your medicine. You may be taking several medicines. ACE (angiotensin-converting enzyme) inhibitors, angiotensin II receptor blockers (ARBs), beta-blockers, diuretics (water pills), and calcium channel blockers control your blood pressure. Statins help lower cholesterol. Your doctor may also prescribe medicines for depression, pain, sleep problems, anxiety, or agitation. If your doctor has given you medicine that prevents blood clots, such as warfarin (Coumadin), aspirin combined with extended-release dipyridamole (Aggrenox), clopidogrel (Plavix), or aspirin to prevent another stroke, you should:  Tell your dentist, pharmacist, and other health professionals that you take these medicines. Watch for unusual bruising or bleeding, such as blood in your urine, red or black stools, or bleeding from your nose or gums. Get regular blood tests to check your clotting time if you are taking Coumadin. Wear medical alert jewelry that says you take blood thinners. You can buy this at most drugstores. Do not take any over-the-counter medicines or herbal products without talking to your doctor first.  If you take birth control pills or hormone replacement therapy, talk to your doctor about whether they are right for you. For family members and caregivers  Make the home safe. Set up a room so that your loved one does not have to climb stairs. Be sure the bathroom is on the same floor.  Move throw rugs and furniture that could cause falls, and make sure that the lighting is good. Put grab bars and seats in tubs and showers. Find out what your loved one can do and what he or she needs help with. Try not to do things for your loved one that your loved one can do on his or her own. Help him or her learn and practice new skills. Visit and talk with your loved one often. Try doing activities together that you both enjoy, such as playing cards or board games. Keep in touch with your loved one's friends as much as you can, and encourage them to visit. Take care of yourself. Do not try to do everything yourself. Ask other family members to help. Eat well, get enough rest, and take time to do things that you enjoy. Keep up with your own doctor visits, and make sure to take your medicines regularly. Get out of the house as much as you can. Join a local support group. Find out if you qualify for home health care visits to help with rehab or for adult day care. When should you call for help? Call 911 anytime you think you may need emergency care. For example, call if:  You have signs of another stroke. These may include:  Sudden numbness, paralysis, or weakness in your face, arm, or leg, especially on only one side of your body. New problems with walking or balance. Sudden vision changes. Drooling or slurred speech. New problems speaking or understanding simple statements, or you feel confused. A sudden, severe headache that is different from past headaches. Call 911 even if these symptoms go away in a few minutes. You cough up blood. You vomit blood or what looks like coffee grounds. You pass maroon or very bloody stools. Call your doctor now or seek immediate medical care if:  You have new bruises or blood spots under your skin. You have a nosebleed. Your gums bleed when you brush your teeth. You have blood in your urine. Your stools are black and tarlike or have streaks of blood.   You have vaginal bleeding when you are not having your period, or heavy period bleeding. You have new symptoms that may be related to your stroke, such as falls or trouble swallowing. Watch closely for changes in your health, and be sure to contact your doctor if you have any problems. Where can you learn more? Go to [employer]    Enter C294  in the search box to learn more about \"Stroke: After Your Visit\". © 0916-8569 [employer]. Care instructions adapted under license by [employer] (which disclaims liability or warranty for this information). This care instruction is for use with your licensed healthcare professional. If you have questions about a medical condition or this instruction, always ask your healthcare professional. Bety Flores any warranty or liability for your use of this information. Patient Education        Stroke: Care Instructions  Your Care Instructions     You have had a stroke. This means that the blood flow to a part of your brain was blocked for some time, which damages the nerve cells in that part of the brain. The part of your body controlled by that part of your brain may not function properly now. The brain is an amazing organ that can heal itself to some degree. The stroke you had damaged part of your brain. But other parts of your brain may take over in some way for the damaged areas. You have already started this process. Your doctor will talk with you about what you can do to prevent another stroke. High blood pressure, high cholesterol, and diabetes are all risk factors for stroke. If you have any of these conditions, work with your doctor to make sure they are under control. Other risk factors for stroke include being overweight, smoking, and not getting regular exercise. Going home may be hard for you and your family. The more you can try to do for yourself, the better. Remember to take each day one at a time.   Follow-up care is a key part of your treatment and safety. Be sure to make and go to all appointments, and call your doctor if you are having problems. It's also a good idea to know your test results and keep a list of the medicines you take. How can you care for yourself at home?    · Enter a stroke rehabilitation (rehab) program, if your doctor recommends it. Physical, speech, and occupational therapies can help you manage bathing, dressing, eating, and other basics of daily living.     · Do not drive until your doctor says it is okay.     · It is normal to feel sad or depressed after a stroke. If these feelings last, talk to your doctor.     · If you are having problems with urine leakage, go to the bathroom at regular times, including when you first wake up and at bedtime. Also, limit fluids after dinner.     · If you are constipated, drink plenty of fluids. If you have kidney, heart, or liver disease and have to limit fluids, talk with your doctor before you increase the amount of fluids you drink. Set up a regular time for using the toilet. If you continue to have constipation, your doctor may suggest using a bulking agent, such as Metamucil, or a stool softener, laxative, or enema. Medicines    · Take your medicines exactly as prescribed. Call your doctor if you think you are having a problem with your medicine. You may be taking several medicines. ACE (angiotensin-converting enzyme) inhibitors, angiotensin II receptor blockers (ARBs), beta-blockers, diuretics (water pills), and calcium channel blockers control your blood pressure. Statins help lower cholesterol. Your doctor may also prescribe medicines for depression, pain, sleep problems, anxiety, or agitation.     · If your doctor has given you a blood thinner to prevent another stroke, be sure you get instructions about how to take your medicine safely.  Blood thinners can cause serious bleeding problems.     · Do not take any over-the-counter medicines or herbal products without talking to your doctor first.     · If you take birth control pills or hormone therapy, talk to your doctor about whether they are right for you. For family members and caregivers    · Make the home safe. Set up a room so that your loved one does not have to climb stairs. Be sure the bathroom is on the same floor. Move throw rugs and furniture that could cause falls. Make sure that the lighting is good. Put grab bars and seats in tubs and showers.     · Find out what your loved one can do and what they need help with. Try not to do things for your loved one that your loved one can do on their own. Help them learn and practice new skills.     · Visit and talk with your loved one often. Try doing activities together that you both enjoy, such as playing cards or board games. Keep in touch with your loved one's friends as much as you can. Encourage them to visit.     · Take care of yourself. Do not try to do everything yourself. Ask other family members to help. Eat well, get enough rest, and take time to do things that you enjoy. Keep up with your own doctor visits, and make sure to take your medicines regularly. Get out of the house as much as you can. Join a local support group. Find out if you qualify for home health care visits to help with rehab or for adult day care. When should you call for help? Call 911 anytime you think you may need emergency care. For example, call if:    · You have signs of another stroke. These may include:  ? Sudden numbness, tingling, weakness, or loss of movement in your face, arm, or leg, especially on only one side of your body. ? Sudden vision changes. ? Sudden trouble speaking. ? Sudden confusion or trouble understanding simple statements. ? Sudden problems with walking or balance. ? A sudden, severe headache that is different from past headaches. Call 911 even if these symptoms go away in a few minutes.    Call your doctor now or seek immediate medical care if:    · You have new symptoms that may be related to your stroke, such as falls or trouble swallowing. Watch closely for changes in your health, and be sure to contact your doctor if you have any problems. Where can you learn more? Go to http://www.gray.com/  Enter C294 in the search box to learn more about \"Stroke: Care Instructions. \"  Current as of: March 4, 2020               Content Version: 12.8  © 2006-2021 [employer], [employer]. Care instructions adapted under license by [employer] (which disclaims liability or warranty for this information). If you have questions about a medical condition or this instruction, always ask your healthcare professional. [address] any warranty or liability for your use of this information.

## 2021-04-15 NOTE — PROGRESS NOTES
Power of [employer] received request to offer assistance with [address]. Spoke with nurse to ensure that patient was sufficiently alert and oriented to proceed with consult. Ms. Franck Solis was in the restroom and I left the HCPOA document at bedside. I informed Ms. Franck Solis that I would return for a follow-up visit. Rev.  Armando Saeed MDiv, BS  Board Certified

## 2021-04-15 NOTE — PROGRESS NOTES
Power of  for Healthcare  Follow-up visit with Ms. Esperanza Lopez to assist with [address]. Spoke with nurse to ensure that patient was sufficiently alert and oriented to proceed with consult. Reviewed information with Ms. Esperanza Lopez and she completed an [employer] Medical Directive. Original returned to patient and copy provided to unit staff to have scanned into the medical record. Rev.  Yesys Lowe MDiv, BS  Board Certified

## 2021-04-15 NOTE — PROGRESS NOTES
ACUTE PHYSICAL THERAPY GOALS:  (Developed with and agreed upon by patient and/or caregiver.)  ALL GOALS MET, CLEAR FOR D/C  LTG:  (1.)Ms. Lisa Lopez will move from supine to sit and sit to supine , scoot up and down and roll side to side in bed with INDEPENDENT within 7 treatment day(s). (2.)Ms. Lisa Lopez will transfer from bed to chair and chair to bed with INDEPENDENT using the least restrictive device within 7 treatment day(s). (3.)Ms. Lisa Lopez will ambulate with INDEPENDENT for 1000 feet with the least restrictive device within 7 treatment day(s). (4.)Ms. Lisa Lopez will tolerate at least 10 min of dynamic standing activity to assist standing ADLs with the least restrictive device within 7 treatment days. (5.)Ms. Lisa Lopez will climb at least 3 steps with INDEPENDENT with the least restrictive device within 7 treatment days.     ________________________________________________________________________________________________        PHYSICAL THERAPY ASSESSMENT: Initial Assessment, Daily Note and Discharge PT Treatment Day # 1      Winston Zimmerman is a 57 y.o. female   PRIMARY DIAGNOSIS: Acute CVA (cerebrovascular accident) ([facility] [address]  Acute CVA (cerebrovascular accident) ([facility] [address] [I63.9]       Reason for Referral:    ICD-10: Treatment Diagnosis: Other lack of cordination (R27.8)  Other abnormalities of gait and mobility (R26.89)  OBSERVATION: Payor: [facility] AFFAIR CCN / Plan: Criselda Berry / Product Type: Federal Funded Programs /     ASSESSMENT:     REHAB RECOMMENDATIONS:   Recommendation to date pending progress:  Setting:   No further skilled therapy   Equipment:    None     PRIOR LEVEL OF FUNCTION:  (Prior to Hospitalization) INITIAL/CURRENT LEVEL OF FUNCTION:  (Most Recently Demonstrated)   Bed Mobility:   Independent  Sit to Stand:   Independent  Transfers:   Independent  Gait/Mobility:   Independent Bed Mobility:   Independent  Sit to Stand:   Independent  Transfers:   Independent  Gait/Mobility:   Independent     ASSESSMENT:  Ms. Josemanuel Zaragoza is moving w/ independence, including 1000' of ambulation and 2 x3 steps. She is clear for d/c from therapy at this time. SUBJECTIVE:   Ms. Josemanuel Zaragoza states, \"I have [id number] grandchildren. \"    SOCIAL HISTORY/LIVING ENVIRONMENT:   Home Environment: Private residence  # Steps to Enter: 3  One/Two Story Residence: One story  Living Alone: No  Support Systems: Family member(s)  OBJECTIVE:     PAIN: VITAL SIGNS: LINES/DRAINS:   Pre Treatment: Pain Screen  Pain Scale 1: Numeric (0 - 10)  Pain Intensity 1: 0  Post Treatment: 0   none  O2 Device: None (Room air)     GROSS EVALUATION:   Within Functional Limits Abnormal/ Functional Abnormal/ Non-Functional (see comments) Not Tested Comments:   AROM [x] [] [] []    PROM [x] [] [] []    Strength [x] [] [] []    Balance [x] [] [] []    Posture [x] [] [] []    Sensation [x] [] [] []    Coordination [x] [] [] []    Tone [x] [] [] []    Edema [x] [] [] []    Activity Tolerance [x] [] [] []     [] [] [] []      COGNITION/  PERCEPTION: Intact Impaired   (see comments) Comments:   Orientation [x] []    Vision [x] []    Hearing [x] []    Command Following [x] []    Safety Awareness [x] []     [x] []      MOBILITY: I Mod I S SBA CGA Min Mod Max Total  NT x2 Comments:   Bed Mobility    Rolling [x] [] [] [] [] [] [] [] [] [] []    Supine to Sit [x] [] [] [] [] [] [] [] [] [] []    Scooting [x] [] [] [] [] [] [] [] [] [] []    Sit to Supine [] [] [] [] [] [] [] [] [] [x] []    Transfers    Sit to Stand [x] [] [] [] [] [] [] [] [] [] []    Bed to Chair [x] [] [] [] [] [] [] [] [] [] []    Stand to Sit [x] [] [] [] [] [] [] [] [] [] []    I=Independent, Mod I=Modified Independent, S=Supervision, SBA=Standby Assistance, CGA=Contact Guard Assistance,   Min=Minimal Assistance, Mod=Moderate Assistance, Max=Maximal Assistance, Total=Total Assistance, NT=Not Tested  GAIT: I Mod I S SBA CGA Min Mod Max Total  NT x2 Comments:   Level of Assistance [x] [] [] [] [] [] [] [] [] [] []    Distance 1000', 2 x 3 stairs    DME None    Gait Quality WNL    Weightbearing Status N/A     I=Independent, Mod I=Modified Independent, S=Supervision, SBA=Standby Assistance, CGA=Contact Guard Assistance,   Min=Minimal Assistance, Mod=Moderate Assistance, Max=Maximal Assistance, Total=Total Assistance, NT=Not Tested    [place] Form       How much difficulty does the patient currently have. .. Unable A Lot A Little None   1. Turning over in bed (including adjusting bedclothes, sheets and blankets)? [] 1   [] 2   [] 3   [x] 4   2. Sitting down on and standing up from a chair with arms ( e.g., wheelchair, bedside commode, etc.)   [] 1   [] 2   [] 3   [x] 4   3. Moving from lying on back to sitting on the side of the bed? [] 1   [] 2   [] 3   [x] 4   How much help from another person does the patient currently need. .. Total A Lot A Little None   4. Moving to and from a bed to a chair (including a wheelchair)? [] 1   [] 2   [] 3   [x] 4   5. Need to walk in hospital room? [] 1   [] 2   [] 3   [x] 4   6. Climbing 3-5 steps with a railing? [] 1   [] 2   [] 3   [x] 4   © 2007, [facility] of [address] [place] [address], under license to [employer]. All rights reserved     Score:  Initial: 24 Most Recent: X (Date: -- )    Interpretation of Tool:  Represents activities that are increasingly more difficult (i.e. Bed mobility, Transfers, Gait). PLAN:   FREQUENCY/DURATION: PT Plan of Care: 3 times/week for duration of hospital stay or until stated goals are met, whichever comes first.    PROBLEM LIST:   (Skilled intervention is medically necessary to address:)  1. Decreased Activity Tolerance  2. Decreased Balance  3. Decreased Gait Ability   INTERVENTIONS PLANNED:   (Benefits and precautions of physical therapy have been discussed with the patient.)  1.  Therapeutic Activity  2. Therapeutic Exercise/HEP  3. Neuromuscular Re-education  4. Gait Training  5. Manual Therapy  6. Education     TREATMENT:     EVALUATION: Low Complexity : (Untimed Charge)    TREATMENT:   (     )  Therapeutic Exercise (10 Minutes): Therapeutic exercises noted below to improve functional activity tolerance.      TREATMENT GRID:  N/A    AFTER TREATMENT POSITION/PRECAUTIONS:  Chair, Needs within reach and RN notified    INTERDISCIPLINARY COLLABORATION:  RN/PCT and PT/PTA    TOTAL TREATMENT DURATION:  PT Patient Time In/Time Out  Time In: [address]  Time Out: [address] [facility]

## 2021-04-18 LAB
BACTERIA SPEC CULT: NORMAL
SERVICE CMNT-IMP: NORMAL

## 2021-06-10 ENCOUNTER — TRANSCRIBE ORDER (OUTPATIENT)
Dept: SCHEDULING | Age: 61
End: 2021-06-10

## 2021-06-10 DIAGNOSIS — Z12.31 VISIT FOR SCREENING MAMMOGRAM: Primary | ICD-10-CM

## 2021-06-11 ENCOUNTER — TRANSCRIBE ORDER (OUTPATIENT)
Dept: SCHEDULING | Age: 61
End: 2021-06-11

## 2021-06-11 DIAGNOSIS — Z12.31 SCREENING MAMMOGRAM FOR HIGH-RISK PATIENT: Primary | ICD-10-CM

## 2021-08-25 ENCOUNTER — HOSPITAL ENCOUNTER (OUTPATIENT)
Dept: MAMMOGRAPHY | Age: 61
Discharge: HOME | End: 2021-08-25
Attending: FAMILY MEDICINE
Payer: OTHER GOVERNMENT

## 2021-08-25 DIAGNOSIS — Z12.31 SCREENING MAMMOGRAM FOR HIGH-RISK PATIENT: ICD-10-CM

## 2021-08-25 PROCEDURE — 77063 BREAST TOMOSYNTHESIS BI: CPT

## 2022-03-18 PROBLEM — I63.9 ACUTE CVA (CEREBROVASCULAR ACCIDENT) (HCC): Status: ACTIVE | Noted: 2021-04-13

## 2022-06-13 ENCOUNTER — APPOINTMENT (OUTPATIENT)
Dept: GENERAL RADIOLOGY | Age: 62
End: 2022-06-13
Payer: OTHER GOVERNMENT

## 2022-06-13 ENCOUNTER — HOSPITAL ENCOUNTER (EMERGENCY)
Age: 62
Discharge: HOME | End: 2022-06-14
Attending: EMERGENCY MEDICINE
Payer: OTHER GOVERNMENT

## 2022-06-13 DIAGNOSIS — M25.561 ACUTE PAIN OF RIGHT KNEE: Primary | ICD-10-CM

## 2022-06-13 PROCEDURE — 73562 X-RAY EXAM OF KNEE 3: CPT

## 2022-06-13 PROCEDURE — 99284 EMERGENCY DEPT VISIT MOD MDM: CPT

## 2022-06-13 PROCEDURE — 96372 THER/PROPH/DIAG INJ SC/IM: CPT

## 2022-06-13 RX ORDER — KETOROLAC TROMETHAMINE 30 MG/ML
30 INJECTION, SOLUTION INTRAMUSCULAR; INTRAVENOUS
Status: COMPLETED | OUTPATIENT
Start: 2022-06-14 | End: 2022-06-14

## 2022-06-13 ASSESSMENT — PAIN SCALES - GENERAL: PAINLEVEL_OUTOF10: 10

## 2022-06-13 ASSESSMENT — PAIN DESCRIPTION - LOCATION: LOCATION: LEG

## 2022-06-13 ASSESSMENT — ENCOUNTER SYMPTOMS
DIARRHEA: 0
CONSTIPATION: 0
NAUSEA: 0
SHORTNESS OF BREATH: 0
COUGH: 0
BACK PAIN: 0
SORE THROAT: 0
VOMITING: 0
ABDOMINAL PAIN: 0
COLOR CHANGE: 0
RHINORRHEA: 0

## 2022-06-13 ASSESSMENT — PAIN - FUNCTIONAL ASSESSMENT: PAIN_FUNCTIONAL_ASSESSMENT: 0-10

## 2022-06-13 ASSESSMENT — PAIN DESCRIPTION - ORIENTATION: ORIENTATION: RIGHT

## 2022-06-13 ASSESSMENT — PAIN DESCRIPTION - PAIN TYPE: TYPE: ACUTE PAIN

## 2022-06-14 VITALS
WEIGHT: 220 LBS | HEIGHT: 64 IN | TEMPERATURE: 98.4 F | OXYGEN SATURATION: 97 % | BODY MASS INDEX: 37.56 KG/M2 | HEART RATE: 81 BPM | RESPIRATION RATE: 18 BRPM | DIASTOLIC BLOOD PRESSURE: 86 MMHG | SYSTOLIC BLOOD PRESSURE: 135 MMHG

## 2022-06-14 PROCEDURE — 6370000000 HC RX 637 (ALT 250 FOR IP): Performed by: EMERGENCY MEDICINE

## 2022-06-14 PROCEDURE — 6360000002 HC RX W HCPCS: Performed by: EMERGENCY MEDICINE

## 2022-06-14 RX ORDER — HYDROCODONE BITARTRATE AND ACETAMINOPHEN 10; 325 MG/1; MG/1
1 TABLET ORAL
Status: COMPLETED | OUTPATIENT
Start: 2022-06-14 | End: 2022-06-14

## 2022-06-14 RX ORDER — HYDROCODONE BITARTRATE AND ACETAMINOPHEN 7.5; 325 MG/1; MG/1
1 TABLET ORAL EVERY 6 HOURS PRN
Qty: 15 TABLET | Refills: 0 | Status: SHIPPED | OUTPATIENT
Start: 2022-06-14 | End: 2022-06-19

## 2022-06-14 RX ADMIN — HYDROCODONE BITARTRATE AND ACETAMINOPHEN 1 TABLET: 10; 325 TABLET ORAL at 01:23

## 2022-06-14 RX ADMIN — KETOROLAC TROMETHAMINE 30 MG: 30 INJECTION, SOLUTION INTRAMUSCULAR at 00:00

## 2022-06-14 NOTE — ED TRIAGE NOTES
Pt arrives to ED via EMS from home for reports of R leg pain. Pt states she was seen at an urgent care & was diagnosed with \"soft tissue calcification. \" Pt states pain got severely worse today, unable to bear weight on R leg at this time. VSS in route per Ems. Pt a&ox4.

## 2022-06-14 NOTE — ED PROVIDER NOTES
[facility] Emergency Department Provider Note                   PCP:                None Provider               Age: 60 y.o. Sex: female     No diagnosis found. DISPOSITION         New Prescriptions    No medications on file       Orders Placed This Encounter   Procedures    XR KNEE RIGHT (3 VIEWS)        Nuvia Fernandez III, MD 1:06 AM      MDM  Number of Diagnoses or Management Options  Diagnosis management comments: Patient with no acute on knee x-ray. Has some arthritic degenerative changes. Will discharge with pain medication at home and orthopedic follow-up. Amount and/or Complexity of Data Reviewed  Tests in the radiology section of CPT®: ordered and reviewed  Tests in the medicine section of CPT®: ordered and reviewed    Patient Progress  Patient progress: aron Marlow is a 60 y.o. female who presents to the Emergency Department with chief complaint of    Chief Complaint   Patient presents with    Leg Pain      Patient with known arthritic pain in the right knee. Has had increased pain since Friday. Went to an urgent care that day with no specific found. Taking meloxicam.  Was getting better up until this evening. Went to stand up off the toilet and had increased pain. No fall or injury. Comes in via EMS. The history is provided by the patient. No  was used. Leg Pain  This is a recurrent problem. The current episode started more than 2 days ago. The problem occurs constantly. The problem has been gradually worsening. Pertinent negatives include no chest pain, no abdominal pain, no headaches and no shortness of breath. The symptoms are aggravated by bending and standing. Nothing relieves the symptoms. All other systems reviewed and are negative. Review of Systems   Constitutional: Negative for chills and fever. HENT: Negative for rhinorrhea and sore throat. Respiratory: Negative for cough and shortness of breath.     Cardiovascular: Negative for chest pain and palpitations. Gastrointestinal: Negative for abdominal pain, constipation, diarrhea, nausea and vomiting. Genitourinary: Negative for dysuria and hematuria. Musculoskeletal: Positive for arthralgias and gait problem. Negative for back pain, joint swelling and neck pain. Skin: Negative for color change and rash. Neurological: Negative for numbness and headaches. All other systems reviewed and are negative. Past Medical History:   Diagnosis Date    Diabetes ([facility] [address]     NIDDM    Endocrine disease     Hypertension     Other ill-defined conditions(799.89)     heart murmur    Other ill-defined conditions(799.89)     anemia    Seizures (HCC)     only after anesthesia    Sleep disorder     CPAP        Past Surgical History:   Procedure Laterality Date    BREAST BIOPSY Right     BREAST REDUCTION SURGERY Bilateral     BREAST SURGERY      benign cyst removed from breast    GYN      tubal lig / hyst    ORTHOPEDIC SURGERY      left knee    OTHER SURGICAL HISTORY      tonsillectomy / ad    OTHER SURGICAL HISTORY      breast reduction    SD ABDOMEN SURGERY PROC UNLISTED      exploratory        Family History   Problem Relation Age of Onset    Breast Cancer Mother     Breast Cancer Sister            Social Connections:     Frequency of Communication with Friends and Family: Not on file    Frequency of Social Gatherings with Friends and Family: Not on file    Attends Sikhism Services: Not on file    Active Member of Clubs or Organizations: Not on file    Attends Club or Organization Meetings: Not on file    Marital Status: Not on file        No Known Allergies     Vitals signs and nursing note reviewed. Patient Vitals for the past 4 hrs:   Temp Pulse Resp BP SpO2   06/13/22 2345 -- -- -- 135/78 95 %   06/13/22 2342 98.4 °F (36.9 °C) 81 18 (!) 131/90 97 %          Physical Exam  Vitals and nursing note reviewed.    Constitutional:       Appearance: Normal appearance. HENT:      Head: Normocephalic and atraumatic. Cardiovascular:      Rate and Rhythm: Normal rate and regular rhythm. Pulmonary:      Effort: Pulmonary effort is normal.      Breath sounds: Normal breath sounds. No wheezing. Abdominal:      General: Bowel sounds are normal.      Palpations: Abdomen is soft. Tenderness: There is no abdominal tenderness. Musculoskeletal:         General: Tenderness (right knee TTP diffusely. no swelling. distal pulse and sensation intact. ) present. No swelling. Cervical back: Normal range of motion. No tenderness. Skin:     General: Skin is warm and dry. Findings: No erythema. Neurological:      Mental Status: She is alert. Procedures    Labs Reviewed - No data to display     XR KNEE RIGHT (3 VIEWS)    (Results Pending)                            Voice dictation software was used during the making of this note. This software is not perfect and grammatical and other typographical errors may be present. This note has not been completely proofread for errors.      Zeeshan Rodarte MD  06/14/22 [id number]

## 2022-06-14 NOTE — ED NOTES
I have reviewed discharge instructions with the patient. The patient verbalized understanding. Patient left ED via Discharge Method: wheelchair to Home withfamily. Opportunity for questions and clarification provided. Patient given 1 scripts. To continue your aftercare when you leave the hospital, you may receive an automated call from our care team to check in on how you are doing. This is a free service and part of our promise to provide the best care and service to meet your aftercare needs.  If you have questions, or wish to unsubscribe from this service please call [phone]. Thank you for Choosing our [facility] Emergency Department.         Clay Ruano RN  06/14/22 0124

## 2022-07-11 ENCOUNTER — TELEPHONE (OUTPATIENT)
Dept: ORTHOPEDIC SURGERY | Age: 62
End: 2022-07-11

## 2022-07-14 ENCOUNTER — OFFICE VISIT (OUTPATIENT)
Dept: ORTHOPEDIC SURGERY | Age: 62
End: 2022-07-14

## 2022-07-14 VITALS — BODY MASS INDEX: 37.9 KG/M2 | WEIGHT: 222 LBS | HEIGHT: 64 IN

## 2022-07-14 DIAGNOSIS — M17.11 PRIMARY OSTEOARTHRITIS OF RIGHT KNEE: ICD-10-CM

## 2022-07-14 DIAGNOSIS — M25.561 RIGHT KNEE PAIN, UNSPECIFIED CHRONICITY: Primary | ICD-10-CM

## 2022-07-14 PROCEDURE — A9999 DME SUPPLY OR ACCESSORY, NOS: HCPCS | Performed by: ORTHOPAEDIC SURGERY

## 2022-07-14 PROCEDURE — 99204 OFFICE O/P NEW MOD 45 MIN: CPT | Performed by: ORTHOPAEDIC SURGERY

## 2022-07-14 RX ORDER — NAPROXEN 500 MG/1
TABLET ORAL
COMMUNITY
End: 2022-09-12

## 2022-07-14 RX ORDER — AMLODIPINE BESYLATE 10 MG/1
TABLET ORAL
COMMUNITY

## 2022-07-14 RX ORDER — LISINOPRIL 40 MG/1
TABLET ORAL
COMMUNITY
End: 2022-09-12

## 2022-07-14 RX ORDER — ALBUTEROL SULFATE 90 UG/1
AEROSOL, METERED RESPIRATORY (INHALATION)
COMMUNITY

## 2022-07-14 RX ORDER — HYDROCHLOROTHIAZIDE 25 MG/1
TABLET ORAL
COMMUNITY
End: 2022-09-12

## 2022-07-14 RX ORDER — FUROSEMIDE 20 MG/1
TABLET ORAL
COMMUNITY
End: 2022-09-12

## 2022-07-14 RX ORDER — FLUTICASONE PROPIONATE 50 MCG
SPRAY, SUSPENSION (ML) NASAL
COMMUNITY

## 2022-07-14 RX ORDER — LISINOPRIL AND HYDROCHLOROTHIAZIDE 12.5; 1 MG/1; MG/1
TABLET ORAL
COMMUNITY
End: 2022-09-12

## 2022-07-14 RX ORDER — INSULIN LISPRO 100 [IU]/ML
16 INJECTION, SOLUTION INTRAVENOUS; SUBCUTANEOUS
COMMUNITY
End: 2022-09-12

## 2022-07-14 RX ORDER — INSULIN DETEMIR 100 [IU]/ML
INJECTION, SOLUTION SUBCUTANEOUS
COMMUNITY
End: 2022-09-12

## 2022-07-14 NOTE — PROGRESS NOTES
Patient was fitted and instructed on a Genutrain Knee Brace size 6 for the right knee. Patient read and signed documenting they understand and agree to [facility]'s current DME return policy.

## 2022-07-14 NOTE — LETTER
DME Patient Authorization Form    Name: Morgan Peabody  : 1960  MRN: [id number]   Age: 60 y.o. Gender: female  Delivery Address: [facility]     Diagnosis:     ICD-10-CM    1. Right knee pain, unspecified chronicity  M25.561 XR KNEE RIGHT (MIN 4 VIEWS)     1276 Alee Hewitt MD, Orthopedic Surgery, [facility]     GenuTrain Knee Brace ()   2. Primary osteoarthritis of right knee  M17.11 1276 Alee Hewitt MD, Orthopedic Surgery, [facility] Knee Brace ()        Requested DME:  GenuTrain Knee VVAHF**-66 ($60.00) X 1 - right        Clinical Notes:     **Indicates non-covered items by insurance. Payment expected on date of service. Electronically signed by  Provider: _______________________________ Date: 2022                             [facility] Tax ID # [id number]        Durable Medical Equipment and/or Orthotics Patient Consent     I understand that my physician has prescribed this medical supply as part of my treatment plan as a matter of Medical Necessity.  I understand that I have a choice in where I receive my prescribed orthopedic supplies and/or services.  I authorize [facility] to furnish this service/product and to provide my insurance carrier with any information requested in order to process for payment.  I instruct my insurance carrier to pay [facility] directly for these services/products.  I understand that my insurance carrier may deny payment for this supply because it is a non-covered item, deemed not medically necessary or considered experimental.   I understand that any cost not covered by my insurance carrier will be solely my financial responsibility.  I have received the Supplier Standards and have reviewed them.    I have received the prescribed item and have been fully instructed on the proper use of the above services/products.    ______ (Patient Initials) I understand that all DME items are non-returnable after being dispensed. Items still in sealed packaging may be returned up to 14 days after purchasing. [address] will replace items that are defective.    ______ (Patient Initials) I understand that [facility] will not file a claim with my insurance carrier for this service/product and I am waiving my right to file a claim on my own for this service/product with my insurance company as this item is NON-COVERED (Denoted by the **) by my Insurance company/policy. ______ (Patient Initials) I understand that I am responsible to bring my equipment to the hospital for any surgery. ______________________________________________  ________________________  Patient / Hua Orion            Thank you for considering [address] Your physician has prescribed specific medical equipment or devices for your home use. The following describes your rights and responsibilities as our customer. Right to Choose Providers: You have a choice regarding which company supplies your home medical equipment and devices, and to consult your physician in this decision. You may choose a medical supply store, a home medical equipment provider, or a specialist such as [facility]. [facility] will coordinate with your physician to provide the medical equipment or devices prescribed for your home use. Right to Service:  You have the right to considerate, respectful and nondiscriminatory care. You have the right to receive accurate and easily understood information about your health care. If you speak a foreign language, or don't understand the discussions, assistance will be provided to allow you to make informed health care decisions.   You have the right to know your treatment options and to participate in decisions about your care, including the right to accept or refuse treatment. You have the right to expect a reasonable response to your requests for treatment or service. You have the right to talk in confidence with health care providers and to have your health care information protected. You have the right to receive an explanation of your bill. You have the right to complain about the service or product you receive. Patient Responsibilities:  Please provide complete and accurate information about your health insurance benefits and make arrangements for the timely payment of your bill. [facility] will, if possible, assume responsibility for billing your insurance (Medicare, Medicaid and commercial) for the prescribed equipment or devices. If your policy does not cover the prescribed product, or only covers a portion of the bill, you are responsible for any remaining balance. Return and Exchange Policy:  [facility] will honor published  Warranties for products. [facility] will accept returns or exchanges within 14 days from the date of receipt, providin) the product must be in new condition; 2) receipt as required; and 3) used disposable and hygiene products may only be returned due to a defective product. Note: Refunds will be issued in a timely manner, please allow 4-6 weeks for processing. Complaint Procedures and DME Consumer Protection Resources:  [facility] values you as a customer, and is committed to resolving patient concerns. This commitment includes understanding and documenting your concerns, conducting a review of internal procedures, and providing you with an explanation and resolution to your concerns. Should you have any questions about our services or billing process, please contact our office at (practice phone number).   If we are unable to resolve the concern, you have the right to direct comments to the office of Consumer Protection, in the [address] or the [facility] office, without fear of repercussion. DMEPOS SUPPLIER STANDARDS    A supplier must be in compliance with all applicable Federal and [facility] [employer] [facility] and regulatory requirements. A supplier must provide complete and accurate information on the DMEPOS supplier application. Any changes to this information must be reported to the [facility] [employer] within 30 days. An authorized individual (one whose signature is binding) must sign the application for billing privileges. A supplier must fill orders from its own inventory, or must contract with other companies for the purchase of items necessary to fill the order. A supplier may not contract with any entity that is currently excluded from the Medicare program, any [facility] program, or from any other Federal procurement or Nonprocurement programs. A supplier must advise beneficiaries that they may rent or purchase inexpensive or routinely purchased durable medical equipment, and of the purchase option for capped rental equipment. A supplier must notify beneficiaries of warranty coverage and honor all warranties under applicable State Law, and repair or replace free of charge Medicare covered items that are under warranty. A supplier must maintain a physical facility on an appropriate site. A supplier must permit CMS, or its agents to conduct on-site inspections to ascertain the supplier's compliance with these standards. The supplier location must be accessible to beneficiaries during reasonable business hours, and must maintain a visible sign and posted hours of operation. A supplier must maintain a primary business telephone listed under the name of the business in a [employer] or a toll free number available through directory assistance. The exclusive use of a beeper, answering machine or cell phone is prohibited.   A supplier must have comprehensive liability insurance in the amount of at least $300,000 that covers both the supplier's place of business and all customers and employees of the supplier. If the supplier manufactures its own items, this insurance must also cover product liability and completed operations. A supplier must agree not to initiate telephone contact with beneficiaries, with a few exceptions allowed. This standard prohibits suppliers from calling beneficiaries in order to solicit new business. A supplier is responsible for delivery and must instruct beneficiaries on use of Medicare covered items, and maintain proof of delivery. A supplier must answer questions, and respond to complaints of the beneficiaries, and maintain documentation of such contacts. A supplier must maintain and replace at no charge or repair directly, or through a service contract with another company, Medicare covered items it has rented to beneficiaries. A supplier must accept returns of substandard (less than full quality for the particular item) or unsuitable items (inappropriate for the beneficiary at the time it was fitted and rented or sold) from beneficiaries. A supplier must disclose these supplier standards to each beneficiary to whom it supplies a Medicare-covered item. A supplier must disclose to the government any person having ownership, financial, or control interest in the supplier. A supplier must not convey or reassign a supplier number; i.e., the supplier may not sell or allow another entity to use its Medicare billing number. A supplier must have a complaint resolution protocol established to address beneficiary complaints that relate to these standards. A record of these complaints must be maintained at the physical facility. Complaint records must include: the name, address, telephone number and health insurance claim number of the beneficiary, a summary of the complaint, and any action taken to resolve it.   A supplier must agree to furnish CMS any information required by the Medicare statute and implementing regulations. A supplier of DMEPOS and other items and services must be accredited by a CMS-approved accreditation organization in order to receive and retain a supplier billing number. The accreditation must indicate the specific products and services, for which the supplier is accredited in order for the supplier to receive payment for those specific products and services. A DMEPOS supplier must notify their accreditation organization when a new DMEPOS location is opened. The accreditation organization may accredit the new supplier location for three months after it is operational without requiring a new site visit. All DMEPOS supplier locations, whether owned or subcontracted, must meet the [employer] and be separately accredited in order to bill Medicare. An accredited supplier may be denied enrollment or their enrollment may be revoked, if CMS determines that they are not in compliance with the DMEPOS quality standards. A DMEPOS supplier must disclose upon enrollment all products and services, including the addition of new product lines for which they are seeking accreditation. If a new product line is added after enrollment, the DMEPOS supplier will be responsible for notifying the accrediting body of the new product so that the DMEPOS supplier can be re-surveyed and accredited for these new products. Must meet the surety bond requirements specified in 42 C. F.R. 424.57(c). Implementation date- May 4, 2009. A supplier must obtain oxygen from a state-licensed oxygen supplier. A supplier must maintain ordering and referring documentation consistent with provisions found in 42 C. F.R. 424.516(f). DMEPOS suppliers are prohibited from sharing a practice location with certain other Medicare providers and suppliers.   DMEPOS suppliers must remain open to the public for a minimum of 30 hours per week with certain exceptions.

## 2022-07-14 NOTE — LETTER
Quintiles  Arthritis oral choices: Individual Turmeric-Curcumin; [place]; Boswellia                           -or-   Combination [employer] Turmeric strength for joints that includes all 3 listed above     Magne topical Sports:   Balm or liquid Spray with frankincense/myrrh      Nerve medication options:   CBD, Alpha Lipoic acid, Lion's portia and any other recommended neuropathic medication            Immune/healing possibilities:  Echinacea, Elderberry    As Needed: Dead sea bath salts, Essential Oils, scar cream, Gout and cramping medications    The above list of recommended medications is only a starting point. Please allow the experts at [facility] to make the final recommendations. Before using any of these medications, please make sure that you have no concerns, senstivities or allergies to the listed ingredients in each bottle/container. Also, please check with your pharmacist or your primary care physician regarding any and all possible interactions with your other daily medications. [employer] Locations:    S  [address] [place] [address], [address] [place], [place] [address]            Sincerely,      Wendy Chang MD

## 2022-07-14 NOTE — PROGRESS NOTES
Name: Krystian Moses  YOB: 1960  Gender: female  MRN: [id number]    CC: Right knee pain    HPI:   09/16/2004: Dr. Nicolette Varela treated bilateral knee arthritis with Supartz injections  [facility] mainly outlining dates of visits and medication but no specific clinical history or information given  12/02/2020: [facility] ER reflects right knee pain after falling 3 months prior to orthopedic visit with Dr. Porfirio Tejeda. Reflects tricompartmental knee arthritis with conservative care  06/13/2022: [facility] reflects arthritic right knee pain that increased Friday prior to that visit. She was getting off the toilet and had significant pain. ER x-rays read as osteoarthritis and associated large suprapatellar intra-articular loose body but no acute abnormality  06/14/2022: [facility]: Dr. Ines Mendez reflects knee pain and treatment at a local urgent care   06/14/2022: CT scan right knee without contrast: Radiologic impression:  1. No acute fracture or findings of stress response identified within the knee. No secondary findings of osteochondral injury to include effusion or lipohemarthrosis. 2.  Tricompartmental chondromalacia and degenerative arthrosis most advanced within the lateral patellofemoral compartment where there is severe joint space narrowing. Multifocal osteochondral body formation throughout the recess of the knee  3. Under the finding sections radiologist mentions multiple osteochondral formation noted within the anterior and posterior joint recess as well as the suprapatellar recess. The largest osteochondral body within the anterior intercondylar notch measures 1.3 cm. Lateral suprapatellar recess measures approximately 1.8 cm    07/14/2022: She presents today for her knee pain.   Her pain is almost back to her chronic baseline knee pain  She reports many years of chronic right knee pain with intermittent flares of pain, but her acute pain was so intense that she could not move her knee  She reports having a MRI scan in [place] but I have no access to the result, but she reports MRI scan revealed what sounds like chondrocalcinosis and loose bodies    ROS/Meds/PSH/PMH/FH/SH: reviewed today    Tobacco:  reports that she has never smoked. She has never used smokeless tobacco.     Physical Examination:  Patient appears to be alert and oriented with acceptable appearance. No obvious distress or SOB  CV: appears to have acceptable vascular color and capillary refill  Neuro:appears to have mostly intact light touch sensation   Skin: Mild right knee effusion  MS: Standing: Plantigrade: Gait good  Right = mild anterior medial and anterolateral knee joint pain; full knee motion; no gross crepitance; no locking catching or giving way  Right = 5/5 strength; no instability    XR: Right side: Standing AP lateral knee plus notch and sunrise views of both knees with bilateral suprapatella exostoses/no calcification; tricompartmental knee arthritis; intercondylar notch exostosis and abnormalities  XR Impression:  As above      Injection: Injection offered today    Assessment:    Right acute on chronic knee pain; knee osteoarthritis; chondrocalcinosis with multiple loose bodies    Plan:   The patient and I discussed the above assessment. We explored treatment options. Unfortunately, with the degree of arthritis present in her knee and with the multiple loose bodies, I feel she needs to get back in touch with Dr. Krystin Leon and plan TKA  2004 she had Supartz injections with Dr. Krystin Leon. Of course I will defer to the expertise of Dr. Krystin Leon, but I do not think this is a sports medicine arthroscopic knee but instead TKA  Advanced medical imaging: No indication for more testing    DME: Genutrain knee brace  We discussed knee care and protection  PT: No indication for formal PT       Medication - OTC meds prn: Prescribed:   Boswellia, Devil's claw, Turmeric-curcumin   Magne sports topical rub with frankincense and myrrh      Surgical discussion: We discussed TKA. I strongly encouraged her to see Dr. Hermann Yang and discuss TKA with him. Of course, surgery is up to the expertise of Dr. Hermann Yang  Follow up: Dr. Hermann Yang  Work status: Regular    This note was created using Dragon voice recognition software which may result in errors of speech and spelling recognition and word/phrase syntax errors.

## 2022-08-15 ENCOUNTER — OFFICE VISIT (OUTPATIENT)
Dept: ORTHOPEDIC SURGERY | Age: 62
End: 2022-08-15
Payer: OTHER GOVERNMENT

## 2022-08-15 DIAGNOSIS — M17.11 PRIMARY OSTEOARTHRITIS OF RIGHT KNEE: Primary | ICD-10-CM

## 2022-08-15 DIAGNOSIS — E66.01 MORBID OBESITY (HCC): ICD-10-CM

## 2022-08-15 PROCEDURE — 99215 OFFICE O/P EST HI 40 MIN: CPT | Performed by: ORTHOPAEDIC SURGERY

## 2022-08-15 NOTE — PROGRESS NOTES
Name: Jenny Dill  YOB: 1960  Gender: female  MRN: [id number]    CC: Right knee pain    HPI: Jenny Dill is a 60 y.o. female who presents with longstanding and worsening right knee pain. She was seen years ago in my office and underwent a course of viscosupplementation with Supartz. She states this helped a bit. Over the past several years has had difficulty getting up and down and is finding her right knee to be less and less reliable. It catches and gives way whether she has difficulty walking distances. It does bother her at night and she has noticed loss of motion in the knee. She has a recent episode where it got much worse and she presented to a local urgent care and was referred and seen by Dr. Dionna Zaidi here in the office. Radiographs demonstrated relatively severe degenerative joint disease involving patellofemoral compartment with tricompartment osteophyte formation she was sent to me for consideration of further treatment. She does receive the majority of her general care at the [facility] and does have incidental diabetes mellitus and some well-controlled COPD symptoms. History was obtained from patient    ROS/Meds/PSH/PMH/FH/SH: I personally reviewed the patients standard intake form. Below are the pertinents    Tobacco:  reports that she has never smoked.  She has never used smokeless tobacco.  Past Medical History:   Diagnosis Date    Diabetes ([address]     NIDDM    Endocrine disease     Hypertension     Other ill-defined conditions(799.89)     heart murmur    Other ill-defined conditions(799.89)     anemia    Seizures ([address]     only after anesthesia    Sleep disorder     CPAP      Past Surgical History:   Procedure Laterality Date    BREAST BIOPSY Right     BREAST REDUCTION SURGERY Bilateral     BREAST SURGERY      benign cyst removed from breast    GYN      tubal lig / hyst    ORTHOPEDIC SURGERY      left knee    OTHER SURGICAL HISTORY      tonsillectomy / ad OTHER SURGICAL HISTORY      breast reduction    [place] ABDOMEN SURGERY PROC UNLISTED      exploratory        Physical Examination:  Physical exam: On examination of the patient's gait there is antalgia in stance on the right side. On examination while standing there is decreased flexion in the lumbosacral spine without acute list or spasm. While seated ,  the Bilateral hip is examined there is full range of motion without obvious issue . On examination of the  Right knee :ROM is 5 to 125 There is significant tenderness to direct palpation and There is a mild effusion. On examination of the  Left knee :ROM is 0 to 125 There is no obvious effusion. Patient is neurologically intact distally. Skin is intact. Imaging:. Radiograph reviewed from her recent visit with  [address] [place] [address] - these are independently reviewed by me today for the purposes of this visit. These demonstrate severe patellofemoral generative joint disease with tricompartmental osteophyte formation. She has relatively extensive ossification of the extensor mechanism with several loose bodies in the suprapatellar region. Radiographic impression: Advanced degenerative joint disease right knee    Assessment:   Degenerative joint disease of the right Knee. I did discuss with the patient the source of the pain and treatment options. We discussed nonsurgical measures including:Weight Loss, Activity Modification, and Use of assistive device. We also discussed the definitive option of total Knee arthroplasty. I counseled the patient regarding the nature of the procedure the preoperative preparation necessary postop recovery and expected outcome. I counseled them regarding the risks of surgery including risk of infection, DVT formation, loss of motion, dislocation and stiffness. This patient has the usual expected risk for perioperative medical or surgical complication.     We discussed time return to work , general activity and long-term expectations. I described the [address] program for the patient and expected time for hospitalization. This patient, with their level of home support, medical comorbidities, and general ambulatory function expected to have an overnight stay in the hospital prior to discharge. I would plan a Cement[employer] Mobile Depuy Attune TKA. Velys Robot-Assisted. I discussed Robot-assisted surgery with the patient I discussed my implant selection process and rationale with the patient. Patient would like to consider options, if they would like to proceed with surgery they will let us know. If so, it will be a category 1 in technical complexity. This reflects my expectation for surgical time and difficulty but does not indicate the overall complexity of the patient's care. Plan:       Follow up:   Return for Surgery.      Zahra Caal MD

## 2022-08-16 DIAGNOSIS — M17.11 PRIMARY OSTEOARTHRITIS OF RIGHT KNEE: Primary | ICD-10-CM

## 2022-08-17 ENCOUNTER — TELEPHONE (OUTPATIENT)
Dept: ORTHOPEDIC SURGERY | Age: 62
End: 2022-08-17

## 2022-08-30 ENCOUNTER — OFFICE VISIT (OUTPATIENT)
Dept: ENT CLINIC | Age: 62
End: 2022-08-30
Payer: OTHER GOVERNMENT

## 2022-08-30 VITALS — WEIGHT: 223 LBS | HEIGHT: 64 IN | BODY MASS INDEX: 38.07 KG/M2

## 2022-08-30 DIAGNOSIS — T46.4X5A ACE-INHIBITOR COUGH: ICD-10-CM

## 2022-08-30 DIAGNOSIS — R05.3 CHRONIC COUGH: Primary | ICD-10-CM

## 2022-08-30 DIAGNOSIS — R05.8 ACE-INHIBITOR COUGH: ICD-10-CM

## 2022-08-30 PROCEDURE — 31575 DIAGNOSTIC LARYNGOSCOPY: CPT | Performed by: STUDENT IN AN ORGANIZED HEALTH CARE EDUCATION/TRAINING PROGRAM

## 2022-08-30 PROCEDURE — 99204 OFFICE O/P NEW MOD 45 MIN: CPT | Performed by: STUDENT IN AN ORGANIZED HEALTH CARE EDUCATION/TRAINING PROGRAM

## 2022-08-30 RX ORDER — KETOROLAC TROMETHAMINE 10 MG/1
TABLET, FILM COATED ORAL
COMMUNITY
Start: 2022-06-14 | End: 2022-09-12

## 2022-08-30 RX ORDER — POTASSIUM CHLORIDE 750 MG/1
TABLET, EXTENDED RELEASE ORAL
COMMUNITY
End: 2022-09-12

## 2022-08-30 RX ORDER — GLIPIZIDE 5 MG/1
TABLET, FILM COATED, EXTENDED RELEASE ORAL
COMMUNITY
End: 2022-09-12

## 2022-08-30 ASSESSMENT — ENCOUNTER SYMPTOMS
COUGH: 1
VOICE CHANGE: 1
EYE DISCHARGE: 0
ABDOMINAL DISTENTION: 0
COLOR CHANGE: 0
APNEA: 0

## 2022-08-30 NOTE — PROGRESS NOTES
HPI:  Cornell Shukla is a 60 y.o. female seen New    Chief Complaint   Patient presents with    Lymphadenopathy     Patient presents today for c/o lymph node swelling x 9 months. . Patient states that she was previously seen by [facility] . Patient states that prior to that she was seen in the [place] and received an abnormal MRI. Patient states that her voice has changed, she denies any pain, and recurrent cough . 76-year-old female patient presents as a new patient referral evaluation with complaint of chronic cough. She was first evaluated by her local [place] in [facility] for which she was prescribed a CT soft tissue neck with contrast which showed possible left base of tongue lesion but with complete obscurity from her dental metal implants and therefore not a great visualization and there was some concern and she was referred to local [facility] practice for which she underwent a in office evaluation and was told to keep an eye on it but was not giving any further instructions or management options for her chronic cough. She describes her chronic cough as having been present for at least several years and describes it as a tickling sensation which worsens when she is in conversations where at times she begins having significant coughing spells with the post tussive emesis. She also admits to taking lisinopril 40 mg daily for at least several years. She is a diabetic. She denies any dysphagia or no neck masses or lymph nodes. Years of chrnic cough, sworse sometimes with conversations. At times so bad that  to vmots episodes. Alelgy meds for couple years without much benefit. On lisinopril for years 40 mg no changes. Past Medical History, Past Surgical History, Family history, Social History, and Medications were all reviewed with the patient today and updated as necessary.      Allergies   Allergen Reactions    Adhesive Tape Other (See Comments)     \"burns \" skin Patient Active Problem List   Diagnosis    Acute CVA (cerebrovascular accident) ([facility]    Primary osteoarthritis of right knee       Current Outpatient Medications   Medication Sig    glipiZIDE (GLUCOTROL XL) 5 MG extended release tablet glipizide ER 5 mg tablet, extended release 24 hr   Take 1 tablet every day by oral route.    ketorolac (TORADOL) 10 MG tablet TAKE 1 TABLET BY MOUTH EVERY 6 HOURS AS NEEDED FOR MILD PAIN OR MODERATE PAIN FOR UP TO 5 DAYS    potassium chloride (KLOR-CON M) 10 MEQ extended release tablet Klor-Con M10 mEq tablet,extended release   Take 1 tablet every day by oral route in the morning. albuterol sulfate HFA (PROVENTIL;VENTOLIN;PROAIR) 108 (90 Base) MCG/ACT inhaler ProAir HFA 90 mcg/actuation aerosol inhaler   Inhale 2 puff(s) EVERY 4 HOURS as needed by inhalation route. amLODIPine (NORVASC) 10 MG tablet amlodipine 10 mg tablet   Take 1 tablet every day by oral route. diclofenac sodium (VOLTAREN) 1 % GEL Voltaren 1 % topical gel   APPLY 2 GRAMS TO THE AFFECTED AREA(S) BY TOPICAL ROUTE 4 TIMES PER DAY    fluticasone (FLONASE) 50 MCG/ACT nasal spray Flonase Allergy Relief 50 mcg/actuation nasal spray,suspension   Spray 1 spray twice a day by intranasal route. furosemide (LASIX) 20 MG tablet furosemide 20 mg tablet   Take 1 tablet every day by oral route in the morning for 30 days. hydroCHLOROthiazide (HYDRODIURIL) 25 MG tablet hydrochlorothiazide 25 mg tablet   Take 1 tablet every day by oral route. insulin detemir (LEVEMIR FLEXTOUCH) 100 UNIT/ML injection pen Levemir FlexTouch U-100 Insulin 100 unit/mL (3 mL) subcutaneous pen   Inject 62 units every day by subcutaneous route for 90 days. insulin lispro, 1 Unit Dial, 100 UNIT/ML SOPN Humalog KwikPen (U-100) Insulin 100 unit/mL subcutaneous   Inject 21 units 3 times a day by subcutaneous route with meals for 90 days.     lisinopril (PRINIVIL;ZESTRIL) 40 MG tablet lisinopril 40 mg tablet   Take 1 tablet every day by oral route. lisinopril-hydroCHLOROthiazide (PRINZIDE;ZESTORETIC) 10-12.5 MG per tablet lisinopril 10 mg-hydrochlorothiazide 12.5 mg tablet   Take 1 tablet every day by oral route. for blood pressure    metoprolol tartrate (LOPRESSOR) 25 MG tablet Take 25 mg by mouth daily    naproxen (NAPROSYN) 500 MG tablet naproxen 500 mg tablet   TAKE 1 TABLET BY MOUTH EVERY 12 HOURS AS NEEDED FOR MILD PAIN OR MODERATE PAIN    vitamin D (CHOLECALCIFEROL) 25 MCG (1000 UT) TABS tablet Take 1,000 Units by mouth daily    cyanocobalamin 1000 MCG/ML injection Inject 1,000 mcg into the muscle every 7 days    insulin aspart (NOVOLOG) 100 UNIT/ML injection pen Inject 18 Units into the skin    insulin glargine (LANTUS;BASAGLAR) 100 UNIT/ML injection pen Inject 65 Units into the skin    levothyroxine (SYNTHROID) 100 MCG tablet Take 100 mcg by mouth every morning (before breakfast)    metFORMIN (GLUCOPHAGE) 500 MG tablet Take 500 mg by mouth daily (with breakfast)    traMADol (ULTRAM) 50 MG tablet Take 50 mg by mouth every 6 hours as needed. aspirin 81 MG chewable tablet Take 81 mg by mouth daily (Patient not taking: No sig reported)    atorvastatin (LIPITOR) 40 MG tablet Take 40 mg by mouth (Patient not taking: No sig reported)     No current facility-administered medications for this visit.        Past Medical History:   Diagnosis Date    Diabetes ([facility]     NIDDM    Endocrine disease     Hypertension     Other ill-defined conditions(799.89)     heart murmur    Other ill-defined conditions(799.89)     anemia    Seizures ([facility]     only after anesthesia    Sleep disorder     CPAP       Past Surgical History:   Procedure Laterality Date    BREAST BIOPSY Right     BREAST REDUCTION SURGERY Bilateral     BREAST SURGERY      benign cyst removed from breast    GYN      tubal lig / hyst    ORTHOPEDIC SURGERY      left knee    OTHER SURGICAL HISTORY      tonsillectomy / ad    OTHER SURGICAL HISTORY      breast reduction    [place] ABDOMEN SURGERY [address] UNLISTED      exploratory       Social History     Tobacco Use    Smoking status: Never    Smokeless tobacco: Never   Substance Use Topics    Alcohol use: No       Family History   Problem Relation Age of Onset    Breast Cancer Mother     Breast Cancer Sister         ROS:    Review of Systems   Constitutional:  Negative for activity change and appetite change. HENT:  Positive for voice change. Negative for congestion. Eyes:  Negative for discharge. Respiratory:  Positive for cough. Negative for apnea. Cardiovascular:  Negative for chest pain. Gastrointestinal:  Negative for abdominal distention. Endocrine: Negative for cold intolerance. Genitourinary:  Negative for difficulty urinating. Musculoskeletal:  Negative for arthralgias. Skin:  Negative for color change. Allergic/Immunologic: Negative for environmental allergies. Neurological:  Negative for dizziness. Hematological:  Negative for adenopathy. Psychiatric/Behavioral:  Negative for agitation. PHYSICAL EXAM:    Ht 5' 4\" (1.626 m)   Wt 223 lb (101.2 kg)   BMI 38.28 kg/m²     Physical Exam  Vitals and nursing note reviewed. Constitutional:       Appearance: Normal appearance. HENT:      Head: Normocephalic and atraumatic. Right Ear: Tympanic membrane, ear canal and external ear normal. There is no impacted cerumen. Left Ear: Tympanic membrane, ear canal and external ear normal. There is no impacted cerumen. Nose: Nose normal. No congestion or rhinorrhea. Mouth/Throat:      Mouth: Mucous membranes are moist.      Pharynx: Oropharynx is clear. No oropharyngeal exudate or posterior oropharyngeal erythema. Comments: High base of tongue Mallampati class IV. Otherwise no concerning oral cavity/oropharyngeal lesions or masses or abnormal findings. Base of tongue palpation unremarkable without lesions or masses. Tonsils present and soft on palpation.   Eyes:      General: No scleral icterus. Neck:      Comments: No palpable cervical lymphadenopathy or neck masses  Pulmonary:      Effort: Pulmonary effort is normal.   Musculoskeletal:      Cervical back: Normal range of motion and neck supple. No rigidity. Lymphadenopathy:      Cervical: No cervical adenopathy. Skin:     General: Skin is warm and dry. Neurological:      Mental Status: She is alert and oriented to person, place, and time. Psychiatric:         Mood and Affect: Mood normal.         Behavior: Behavior normal.          PROCEDURE: Flexible laryngoscopy  INDICATIONS: Oropharyngeal lesion, chronic cough  DESCRIPTION: After verbal consent was obtained and a timeout was performed, the flexible scope was advanced down one of the patient's nares into the nasopharynx. The nasal cavity and nasopharynx were clear. The scope was then turned distally down towards the oropharynx. The BOT and vallecula were clear and the epiglottis was normal in appearance. Both aryepiglottic folds were clear and there was a normal appearing glottis w/ healthy TVCs. No nodules or polyps and the cords were fully mobile. Airway widely patent. No concerning lesions seen along post-cricoid region or within either piriform sinus. The posterior pharyngeal was clear as well. The scope was then carefully removed. The patient tolerated the procedure well and there were no complications. ASSESSMENT and PLAN        ICD-10-CM    1. Chronic cough  R05.3 LARYNGOSCOPY,FLEX FIBER,DIAGNOSTIC      2. ACE-inhibitor cough  R05.8     T46.4X5A           Patient was reassured of no concerning findings on today's full head and neck evaluation including flexible laryngoscopy exam particular with no evidence of any concerning lesions or masses to her oropharynx or base of tongue.   We reviewed at length her recent CT soft tissue neck with contrast which had an indeterminant view of the base of tongue with questionable mass to the left base of tongue but obscured by artifact most likely related to her dental metal devices. Thankfully on flexible laryngoscopy there was no evidence of asymmetry to the palatine tonsils or lingual tonsils or the vallecula or base of tongue. Review of her home medication shows that she is on a dose of lisinopril 40 mg for which she admits that she has been on long-term for at least 10+ years and she has also had an associated chronic dry hacking tickling cough worsening with conversations for the last several years as well. We discussed at length the association between ACE inhibitor's and chronic cough and she will discuss this with her primary care physician for an alternate medication as I do believe this is most likely contributing to her chronic cough.   If cough continues despite switching medications in the future she would most benefit from a referral to pulmonologist for pulmonary function testing which she has not had in the past.      Milo Bo, DO  8/30/2022

## 2022-09-02 ENCOUNTER — PREP FOR PROCEDURE (OUTPATIENT)
Dept: ORTHOPEDIC SURGERY | Age: 62
End: 2022-09-02

## 2022-09-02 DIAGNOSIS — M17.11 PRIMARY OSTEOARTHRITIS OF RIGHT KNEE: Primary | ICD-10-CM

## 2022-09-02 RX ORDER — ACETAMINOPHEN 325 MG/1
1000 TABLET ORAL ONCE
Status: CANCELLED | OUTPATIENT
Start: 2022-09-02 | End: 2022-09-02

## 2022-09-12 ENCOUNTER — HOSPITAL ENCOUNTER (OUTPATIENT)
Dept: SURGERY | Age: 62
Discharge: HOME | End: 2022-09-15
Payer: OTHER GOVERNMENT

## 2022-09-12 ENCOUNTER — HOSPITAL ENCOUNTER (OUTPATIENT)
Dept: REHABILITATION | Age: 62
Discharge: HOME | End: 2022-09-15
Payer: OTHER GOVERNMENT

## 2022-09-12 VITALS
BODY MASS INDEX: 37.73 KG/M2 | DIASTOLIC BLOOD PRESSURE: 76 MMHG | TEMPERATURE: 98 F | HEART RATE: 82 BPM | SYSTOLIC BLOOD PRESSURE: 127 MMHG | RESPIRATION RATE: 18 BRPM | OXYGEN SATURATION: 96 % | WEIGHT: 221 LBS | HEIGHT: 64 IN

## 2022-09-12 DIAGNOSIS — R06.83 SNORING: ICD-10-CM

## 2022-09-12 DIAGNOSIS — Z91.14 NONCOMPLIANCE WITH CPAP TREATMENT: Primary | ICD-10-CM

## 2022-09-12 DIAGNOSIS — M17.11 PRIMARY OSTEOARTHRITIS OF RIGHT KNEE: ICD-10-CM

## 2022-09-12 LAB
ANION GAP SERPL CALC-SCNC: 7 MMOL/L (ref 4–13)
BACTERIA SPEC CULT: NORMAL
BASOPHILS # BLD: 0.1 K/UL (ref 0–0.2)
BASOPHILS NFR BLD: 1 % (ref 0–2)
BUN SERPL-MCNC: 9 MG/DL (ref 8–23)
CALCIUM SERPL-MCNC: 8.8 MG/DL (ref 8.3–10.4)
CHLORIDE SERPL-SCNC: 106 MMOL/L (ref 101–110)
CO2 SERPL-SCNC: 28 MMOL/L (ref 21–32)
CREAT SERPL-MCNC: 0.9 MG/DL (ref 0.6–1)
DIFFERENTIAL METHOD BLD: ABNORMAL
EKG ATRIAL RATE: 81 BPM
EKG DIAGNOSIS: NORMAL
EKG P AXIS: 54 DEGREES
EKG P-R INTERVAL: 156 MS
EKG Q-T INTERVAL: 390 MS
EKG QRS DURATION: 80 MS
EKG QTC CALCULATION (BAZETT): 453 MS
EKG R AXIS: 89 DEGREES
EKG T AXIS: 60 DEGREES
EKG VENTRICULAR RATE: 81 BPM
EOSINOPHIL # BLD: 0.2 K/UL (ref 0–0.8)
EOSINOPHIL NFR BLD: 2 % (ref 0.5–7.8)
ERYTHROCYTE [DISTWIDTH] IN BLOOD BY AUTOMATED COUNT: 14.7 % (ref 11.9–14.6)
EST. AVERAGE GLUCOSE BLD GHB EST-MCNC: 154 MG/DL
GLUCOSE SERPL-MCNC: 123 MG/DL (ref 65–100)
HBA1C MFR BLD: 7 % (ref 4.8–5.6)
HCT VFR BLD AUTO: 35.7 % (ref 35.8–46.3)
HGB BLD-MCNC: 11.8 G/DL (ref 11.7–15.4)
IMM GRANULOCYTES # BLD AUTO: 0 K/UL (ref 0–0.5)
IMM GRANULOCYTES NFR BLD AUTO: 0 % (ref 0–5)
LYMPHOCYTES # BLD: 2.8 K/UL (ref 0.5–4.6)
LYMPHOCYTES NFR BLD: 30 % (ref 13–44)
MCH RBC QN AUTO: 27.1 PG (ref 26.1–32.9)
MCHC RBC AUTO-ENTMCNC: 33.1 G/DL (ref 31.4–35)
MCV RBC AUTO: 81.9 FL (ref 79.6–97.8)
MONOCYTES # BLD: 0.6 K/UL (ref 0.1–1.3)
MONOCYTES NFR BLD: 6 % (ref 4–12)
NEUTS SEG # BLD: 5.8 K/UL (ref 1.7–8.2)
NEUTS SEG NFR BLD: 61 % (ref 43–78)
NRBC # BLD: 0 K/UL (ref 0–0.2)
PLATELET # BLD AUTO: 440 K/UL (ref 150–450)
PMV BLD AUTO: 10.1 FL (ref 9.4–12.3)
POTASSIUM SERPL-SCNC: 3.2 MMOL/L (ref 3.5–5.1)
RBC # BLD AUTO: 4.36 M/UL (ref 4.05–5.2)
SERVICE CMNT-IMP: NORMAL
SODIUM SERPL-SCNC: 141 MMOL/L (ref 136–145)
WBC # BLD AUTO: 9.5 K/UL (ref 4.3–11.1)

## 2022-09-12 PROCEDURE — 94664 DEMO&/EVAL PT USE INHALER: CPT

## 2022-09-12 PROCEDURE — 97161 PT EVAL LOW COMPLEX 20 MIN: CPT

## 2022-09-12 PROCEDURE — 80048 BASIC METABOLIC PNL TOTAL CA: CPT

## 2022-09-12 PROCEDURE — 93005 ELECTROCARDIOGRAM TRACING: CPT | Performed by: ANESTHESIOLOGY

## 2022-09-12 PROCEDURE — 85025 COMPLETE CBC W/AUTO DIFF WBC: CPT

## 2022-09-12 PROCEDURE — 87641 MR-STAPH DNA AMP PROBE: CPT

## 2022-09-12 PROCEDURE — 83036 HEMOGLOBIN GLYCOSYLATED A1C: CPT

## 2022-09-12 PROCEDURE — 94760 N-INVAS EAR/PLS OXIMETRY 1: CPT

## 2022-09-12 RX ORDER — LOSARTAN POTASSIUM 50 MG/1
50 TABLET ORAL DAILY
COMMUNITY

## 2022-09-12 RX ORDER — CHLORTHALIDONE 25 MG/1
25 TABLET ORAL DAILY
COMMUNITY

## 2022-09-12 RX ORDER — CETIRIZINE HYDROCHLORIDE 10 MG/1
10 TABLET ORAL DAILY
COMMUNITY

## 2022-09-12 RX ORDER — OMEPRAZOLE 20 MG/1
20 CAPSULE, DELAYED RELEASE ORAL DAILY
COMMUNITY

## 2022-09-12 RX ORDER — LEVOTHYROXINE SODIUM 0.15 MG/1
150 TABLET ORAL DAILY
COMMUNITY

## 2022-09-12 ASSESSMENT — KOOS JR
RISING FROM SITTING: 2
KOOS JR TOTAL INTERVAL SCORE: 39.625
GOING UP OR DOWN STAIRS: 2
TWISING OR PIVOTING ON KNEE: 4
STRAIGHTENING KNEE FULLY: 3
STANDING UPRIGHT: 2
BENDING TO THE FLOOR TO PICK UP OBJECT: 3
HOW SEVERE IS YOUR KNEE STIFFNESS AFTER FIRST WAKING IN MORNING: 3

## 2022-09-12 ASSESSMENT — PAIN DESCRIPTION - LOCATION: LOCATION: KNEE

## 2022-09-12 ASSESSMENT — PULMONARY FUNCTION TESTS
FEV1 (LITERS): 1.6
FEV1 (%PREDICTED): 79

## 2022-09-12 NOTE — CARE COORDINATION
[facility] Case Management note:  Patient screened in Prehab for discharge planning needs. Patient scheduled for a future total joint replacement. We discussed Home Health and equipment needed after surgery. List of Home Health providers offered. Patient w/o preference towards provider as long as [place] will pay. Order for M[facility] sent to [address] She will also notify her [place] PCP so he or she will order and approve prior to surgery. We anticipate we will use [facility]. [facility] for [facility] sent to [facility].  [facility] has already [facility] a walker and 3-1 BSC

## 2022-09-12 NOTE — PERIOP NOTE
Informed by [facility] that they have misplaced PT/PTT specimen and will need to be recollected. Called and LVM for patient.

## 2022-09-12 NOTE — PROGRESS NOTES
Morgan Peabody  : 1960  Primary: [employer]  Secondary:  [facility], [place] [address].  [phone]        Physical Therapy Prehab Evaluation Summary:2022   Time In/Out   PT Charge Capture  Episode     MEDICAL/REFERRING DIAGNOSIS: Unilateral primary osteoarthritis, right knee [M17.11]  REFERRING PHYSICIAN: Faith Birmingham MD    ICD-10: Treatment Diagnosis:   Pain in Right Knee (M25.561)  Stiffness of Right Knee, Not elsewhere classified (M25.661)  Difficulty in walking, Not elsewhere classified (R26.2)    DATE OF SURGERY: 22  Assessment:   COMMENTS:  Pt. Is here with her grand daughter and she plans to go home with support from her spouse and grand daughter. PROBLEM LIST:   (Impacting functional limitations):  Ms. Herman Khan presents with the following lower extremity(s) problems:  Strength  Range of Motion  Home Exercise Program  Pain INTERVENTIONS PLANNED:   (Benefits and precautions of physical therapy have been discussed with the patient.)  Home Exercise Program  Educational Discussion       GOALS: (Goals have been discussed and agreed upon with patient.)  Discharge Goals: Time Frame: 1 Day  Patient will demonstrate independence with a home exercise program designed to increase strength, range of motion, and pain control to minimize functional deficits and optimize patient for total joint replacement. Subjective:   Past Medical History/Comorbidities:   Ms. Herman Khan  has a past medical history of Chronic cough, Diabetes ([address] Endocrine disease, Environmental allergies, Hypertension, Other ill-defined conditions(799.89), Other ill-defined conditions(799.89), Seizures ([address] Sleep disorder, and TIA (transient ischemic attack).   Ms. Herman Khan  has a past surgical history that includes Breast biopsy (Right); Breast reduction surgery (Bilateral); orthopedic surgery; other surgical history; pr abdomen surgery proc unlisted; gyn; and Breast surgery.     Allergies:  Adhesive tape    Current Medications:  Refer to pre-assessment nursing note    Home Set-Up/Prior Level of Function:  Lives With: Spouse  Type of Home: House  Home Layout: One level  Home Access: Stairs to enter with rails  Entrance Stairs - Number of Steps: 4  Bathroom Shower/Tub: Tub/Shower unit  Bathroom Equipment: Tub transfer bench, 3-in-1 commode  Home Equipment: Walker, rolling    Dominant Side:  Dominant Hand: : Right    Current Functional Status:   Ambulation:  [x] Independent  [] Walk Indoors Only  [] Walk Outdoors  [] Use Assistive Device  [] Use Wheelchair Only Dressing:  [x] Independent  Requires Assistance from Someone for:  [] Sock/Shoes  [] Pants  [] Everything   Bathing/Showering:   [x] Independent  [] Requires Assistance from Someone  [] Sponge Bath Only Household Activities:  [x] Routine house and yard work  [] Light Housework Only  [] None     Objective:   PAIN:   Pre-Treatment Pain  Pain Assessment: 0-10  Pain Level:  (10 at worst)  Pain Location: Knee    Post Treatment: knee pain    Outcome Measure:   HOOS-JR:       KOOS-JR:  KOOS, Jr. Knee Survey Score: 19    LOWER EXTREMITY GROSS EVALUATION:  RIGHT LE   Within Functional Limits   Abnormal   Comments   Strength [] []  4/5   Range of Motion [] [] AROM RLE (degrees)  RLE AROM: Exceptions  R Knee Flexion 0-145: 113  R Knee Extension 0: 3      LEFT LE Within Functional Limits   Abnormal   Comments   Strength [x] []     Range of Motion [x] []       UPPER EXTREMITY GROSS EVALUATION:     Within Functional Limits   Abnormal   Comments   Strength [] []    Range of Motion [] []      SENSATION  Sensation [x]       COGNITION/  PERCEPTION: Intact Impaired (Comments):   Orientation [x]     Vision [x]     Hearing [x]     Cognition  [x]       TRANSFERS: I Mod I S SBA CGA Min Mod Max Total  NT x2 Comments:   Sit to Stand [x] [] [] [] [] [] [] [] [] [] []    Stand to Sit [x] [] [] [] [] [] [] [] [] [] []    Stand pivot [] [] [] [] [] [] [] [] [] [] []     [] [] [] [] [] [] [] [] [] [] []    I=Independent, Mod I=Modified Independent, S=Supervision, SBA=Standby Assistance, CGA=Contact Guard Assistance,   Min=Minimal Assistance, Mod=Moderate Assistance, Max=Maximal Assistance, Total=Total Assistance, NT=Not Tested    BALANCE: Good Fair+ Fair Fair- Poor NT Comments   Sitting Static [x] [] [] [] [] []    Sitting Dynamic [x] [] [] [] [] []              Standing Static [] [x] [] [] [] []    Standing Dynamic [] [x] [] [] [] []      Postural Assessment:   Rounded Shoulders    GAIT: I Mod I S SBA CGA Min Mod Max Total  NT x2 Comments:   Level of Assistance [x] [] [] [] [] [] [] [] [] [] []    Weightbearing Status       Distance  200 feet    Gait Quality Decreased romulo  and Decreased stance    DME None     Stairs      Ramp     I=Independent, Mod I=Modified Independent, S=Supervision, SBA=Standby Assistance, CGA=Contact Guard Assistance,   Min=Minimal Assistance, Mod=Moderate Assistance, Max=Maximal Assistance, Total=Total Assistance, NT=Not Tested    TREATMENT:   EVALUATION: LOW COMPLEXITY: (Untimed Charge)    TREATMENT PLAN:   Effective Dates: 9/12/2022 TO 9/12/2022. Treatment/Session Assessment:  Patient was instructed in PT- HEP to increase strength and ROM in LEs. Answered all questions. Frequency/Duration: Patient to continue to perform home exercise program at least twice per day up until her surgery. EDUCATION: Education Given To: Patient, Family  Education Provided: Home Exercise Program  Education Method: Demonstration, Verbal  Education Outcome: Verbalized understanding    MEDICAL NECESSITY: Ms. Kaya Dumont is expected to optimize herlower extremity strength and ROM in preparation for joint replacement surgery. REASON FOR CONTINUED SERVICES: Achieve baseline assesment of musculoskeletal system, functional mobility and home environment. , educate in PT HEP in preparation for surgery, educate in hospital plan of care.     COMPLIANCE WITH PROGRAM/EXERCISE: compliant most of the time. TOTAL TREATMENT DURATION:  Time In: 0900  Time Out: 9727  Minutes: 10    Regarding Valentin Gale's therapy, I certify that the treatment plan above will be carried out by a therapist or under their direction.   Thank you for this referral,  Destini Weston, PT

## 2022-09-12 NOTE — PROGRESS NOTES
Pt's symptoms include:    Snoring  Tiredness- excessive daytime sleepiness  Observed apnea  HTN  GERD  Neck size         40.5     cm  Modified Dorado stage 4  SACS Score 22  STOP BANG 8  Height   5   '   4 \"   Weight   221  lbs  BMI 37.93      Refer patient for sleep study based on above assessment.   [facility]  Phone number:    [phone]

## 2022-09-12 NOTE — PROGRESS NOTES
09/12/22 0830   Treatment   Treatment Type Bedside spirometry   Oxygen Therapy/Pulse Ox   O2 Therapy Room air   Heart Rate 82   SpO2 96 %   Pulse Oximeter Device Mode Intermittent   $Pulse Oximeter $Spot check (single)   Bedside Spirometry   FEV-1/Actual (Liters) 1.6 Liters   FEV-1/Predicted (Liters) 79 Liters   RT Misc Charges   $RT Education $HHN/MDI/IPPB Demo or Eval   Initial respiratory Assessment completed with pt. Pt was interviewed and evaluated in Joint camp prior to surgery. Patient ID:  Giana Fernandez  [id number]  22 y.o.  1960  Surgeon: Dr. Bony Noland  Date of Surgery: [email or web address]  Procedure: Total Right Knee Arthroplasty  Primary Care Physician: TJ Lopez - SHAYNE [phone]  Specialists:     BS:  DIMINISHED    Pt taught proper COUGH technique  IS REVIEWED WITH PT AS WELL AS BENEFITS OF USING IS IN SEDENTARY PTS.   DIAPHRAGMATIC BREATHING EXERCISE INSTRUCTIONS GIVEN    History of smoking:   DENIES                 Quit date:         Secondhand smoke:FATHER    Past procedures with Oxygen desaturation or delayed awakening:DELAYED AWAKENING    Past Medical History:   Diagnosis Date    Chronic cough     believes to be related to lisinopril, medication changed 8/30/22    Diabetes ([facility] [address]     NIDDM, average 's, symptomatic for hypoglycemia in the 60's, A1c 7.0 on 9/12/22    Endocrine disease     Environmental allergies     PRN inhaler--last used 9/8/22    Hypertension     Other ill-defined conditions(799.89)     heart murmur    Other ill-defined conditions(799.89)     anemia    Seizures ([address]     one time after anesthesia ~ 15 years ago    Sleep disorder     CPAP    TIA (transient ischemic attack) 04/2021    HX OF COVID 8/7/2022  HX OF PNA TWICE  CHRONIC COUGH    RECENT URI- COUGHING UP WHITE SPUTUM- PT JUST COMPLETED ANTIBIOTICS  Respiratory history:DENIES SOB                                                                   Respiratory meds:  PT uses MDI PRN with PROAIR. MDI instructions given verbally & written along with spacer. Pt to use home MDI's morning of surgery & bring to [address]:             NO     PAST SLEEP STUDY:        YES                    HX OF GARRY:                        YES                      GARRY assessment:     DANGERS OF UNTREATED GARRY EXPLAINED TO PT.                                          SLEEPS ON SIDE       &      BACK         &       STOMACH  PHYSICAL EXAM   Body mass index is 37.93 kg/m². Vitals:    09/12/22 0830   BP:    Pulse: (P) 82   Resp:    Temp:    SpO2: (P) 96%     Neck circumference:     40.5 cm    Loud snoring:                                                 YES             Witnessed apnea or wakening gasping or choking:           APNEA  Awakens with headaches:                                               DENIES  Morning or daytime tiredness/ sleepiness:                           TIRED  Dry mouth or sore throat in morning:                   DENIES                                   Dorado stage:  4                                   SACS score:22  Stop Bang                                PT NON-COMPLIANT with CPAP. Dangers of non-compliance with treatment of GARRY explained to pt. GARRY handouts given to pt. ALBUTEROL Q 6 PRN  CONT. SAT MONITOR HS after surgery. O2 @ 3 lpm NC HS   RESPIRATORY ASSESSMENT Q SHIFT. Referrals:  [facility]  Pt.  Phone Number:

## 2022-09-12 NOTE — PERIOP NOTE
PLEASE CONTINUE TAKING ALL PRESCRIPTION MEDICATIONS UP TO THE DAY OF SURGERY UNLESS OTHERWISE DIRECTED BELOW. DISCONTINUE all vitamins, herbals and supplements 21 days prior to surgery. DISCONTINUE Non-Steriodal Anti-Inflammatory (NSAIDS) such as Advil, Ibuprofen, and Aleve 5 days prior to surgery. Home Medications to HOLD       All vitamins, supplements, and herbals stop today. All NSAIDs such as Advil, Aleve, Ibuprofen, Diclofenac, Ketorolac (Toradol), Naproxen, etc. Stop 5 days prior to surgery. *IT IS OK TO TAKE TYLENOL*      Home Medications to take  the day of surgery   Inhaler if needed, Cetirizine, Metoprolol, Amlodipine, Flonase if needed, Levothyroxine, Omeprazole          Comments   *The day before surgery, 9/26/22, take Acetaminophen (Tylenol) 1000mg in the morning and again at bedtime. Can substitute 650mg Tylenol*    *Take 44 units of insulin Glargine (Lantus) the night before surgery (9/26/22)*   BRING: inhaler, incentive spirometer              Please do not bring home medications with you on the day of surgery unless otherwise directed by your nurse. If you are instructed to bring home medications, please give them to your nurse as they will be administered by the nursing staff. If you have any questions, please call [facility] [phone] or [facility] [phone].

## 2022-09-13 ENCOUNTER — HOSPITAL ENCOUNTER (OUTPATIENT)
Dept: LAB | Age: 62
Discharge: HOME | End: 2022-09-16

## 2022-09-13 ENCOUNTER — TRANSCRIBE ORDERS (OUTPATIENT)
Dept: SCHEDULING | Age: 62
End: 2022-09-13

## 2022-09-13 DIAGNOSIS — Z12.31 VISIT FOR SCREENING MAMMOGRAM: Primary | ICD-10-CM

## 2022-09-13 LAB
APTT PPP: 26.8 SEC (ref 24.1–35.1)
INR PPP: 1
PROTHROMBIN TIME: 13.4 SEC (ref 12.6–14.5)

## 2022-09-13 PROCEDURE — 85730 THROMBOPLASTIN TIME PARTIAL: CPT

## 2022-09-13 PROCEDURE — 36415 COLL VENOUS BLD VENIPUNCTURE: CPT

## 2022-09-13 PROCEDURE — 85610 PROTHROMBIN TIME: CPT

## 2022-09-13 NOTE — PROGRESS NOTES
The lab results below are within anesthesia guidelines, no further action required.     Latest Reference Range & Units 9/13/22 09:22   Prothrombin Time 12.6 - 14.5 sec 13.4   INR -   1.0   PTT 24.1 - 35.1 SEC 26.8

## 2022-09-14 PROBLEM — R06.83 SNORING: Status: ACTIVE | Noted: 2022-09-14

## 2022-09-14 PROBLEM — Z91.14 NONCOMPLIANCE WITH CPAP TREATMENT: Status: ACTIVE | Noted: 2022-09-14

## 2022-09-14 PROBLEM — Z91.199 NONCOMPLIANCE WITH CPAP TREATMENT: Status: ACTIVE | Noted: 2022-09-14

## 2022-09-14 RX ORDER — ALBUTEROL SULFATE 2.5 MG/3ML
2.5 SOLUTION RESPIRATORY (INHALATION) EVERY 6 HOURS PRN
Status: CANCELLED | OUTPATIENT
Start: 2022-09-14

## 2022-09-14 NOTE — PROGRESS NOTES
Total Joint Surgery Preoperative Chart Review      Patient ID:  Samantha Levy  [id number]  51 y.o.  1960  Surgeon: Dr. Meryle Jakes  Date of Surgery: 9/27/2022  Procedure: Total Right Knee Arthroplasty  Primary Care Physician: TJ Street [phone]  Specialty Physician(s):      Subjective:   Samantha Levy is a 60 y.o. Black / [place] American female who presents for preoperative evaluation for Total Right Knee arthroplasty. This is a preoperative chart review note based on data collected by the nurse at the surgical Pre-Assessment visit. Past Medical History:   Diagnosis Date    Chronic cough     believes to be related to lisinopril, medication changed 8/30/22    Diabetes ([facility] [address]     NIDDM, average 's, symptomatic for hypoglycemia in the 60's, A1c 7.0 on 9/12/22    Endocrine disease     Environmental allergies     PRN inhaler--last used 9/8/22    Hypertension     Other ill-defined conditions(799.89)     heart murmur    Other ill-defined conditions(799.89)     anemia    Seizures ([facility])     one time after anesthesia ~ 15 years ago    Sleep disorder     CPAP    TIA (transient ischemic attack) 04/2021      Past Surgical History:   Procedure Laterality Date    BREAST BIOPSY Right     BREAST REDUCTION SURGERY Bilateral     BREAST SURGERY      benign cyst removed from breast    GYN      tubal lig / hyst    ORTHOPEDIC SURGERY      left knee    OTHER SURGICAL HISTORY      tonsillectomy / ad    [place] ABDOMEN SURGERY PROC UNLISTED      exploratory     Family History   Problem Relation Age of Onset    Breast Cancer Mother     Breast Cancer Sister       Social History     Tobacco Use    Smoking status: Never    Smokeless tobacco: Never   Substance Use Topics    Alcohol use: No       Prior to Admission medications    Medication Sig Start Date End Date Taking?  Authorizing Provider   chlorthalidone (HYGROTON) 25 MG tablet Take 25 mg by mouth daily   Yes Historical Provider, MD   losartan (COZAAR) Recommendations:           Patient reports the symptoms of snoring, fatigue, observed apnea and /or excessive daytime sleepiness. Will refer patient for HST based on above assessment. The patient is not compliant with wearing CPAP. Recommend oxygen saturation monitoring during hospitalization and oxygen at 3 liter via nc qhs. Albuterol every 6 hours as need during hospitalization.      Signed By: Bashir Tyler, APRN - CNP-C    September 14, 2022

## 2022-09-23 ENCOUNTER — OFFICE VISIT (OUTPATIENT)
Dept: ORTHOPEDIC SURGERY | Age: 62
End: 2022-09-23

## 2022-09-23 DIAGNOSIS — M17.11 PRIMARY OSTEOARTHRITIS OF RIGHT KNEE: ICD-10-CM

## 2022-09-23 DIAGNOSIS — M25.561 ACUTE PAIN OF RIGHT KNEE: Primary | ICD-10-CM

## 2022-09-23 PROCEDURE — PREOPEXAM PRE-OP EXAM: Performed by: PHYSICIAN ASSISTANT

## 2022-09-23 NOTE — PROGRESS NOTES
Name: Stacy Giraldo  YOB: 1960  Gender: female  MRN: [id number]    Pre-Op     CC: RIGHT KNEE PAIN       This patient comes in for pre-op exam prior to RIGHT TKA. The patient has been cleared preoperatively. I counseled the patient once again about the risks of infection, DVT formation, expected time of hospitalization, anticipated recovery time as well as rehab needs and expectations for recovery. The patient would like to proceed and we will do so as planned. The patient was provided with pain medications as well as DVT prophylaxis to have on hand postoperatively at the time of discharge from hospital. All pertinent questions asked by the patient were answered.     LU Landers

## 2022-09-26 ENCOUNTER — ANESTHESIA EVENT (OUTPATIENT)
Dept: SURGERY | Age: 62
End: 2022-09-26
Payer: OTHER GOVERNMENT

## 2022-09-26 NOTE — H&P
H&P    Patient ID:  Morgan Peabody  [id number]  64 y.o.  1960  Surgeon:  Sherry Holguin MD  Date of Surgery: * No surgery date entered *  Procedure: Robot assisted right Total Knee Arthroplasty  Primary Care Physician: TJ Rico NP        Subjective:  Morgan Peabody is a 60 y.o. Black / [place] American female who presents with right knee pain. They have a history of right knee pain for several months. Symptoms worse with walking long distances and relieved with rest. Conservative treatment consisting of  activity modification and injections have not helped. The patient lives with their family. The patients goal after surgery is improved pain and function.         Past Medical History:   Diagnosis Date    Chronic cough     believes to be related to lisinopril, medication changed 8/30/22    Diabetes ([facility] [address]     NIDDM, average 's, symptomatic for hypoglycemia in the 60's, A1c 7.0 on 9/12/22    Endocrine disease     Environmental allergies     PRN inhaler--last used 9/8/22    Hypertension     Other ill-defined conditions(799.89)     heart murmur    Other ill-defined conditions(799.89)     anemia    Seizures ([facility])     one time after anesthesia ~ 15 years ago    Sleep disorder     CPAP    TIA (transient ischemic attack) 04/2021      Past Surgical History:   Procedure Laterality Date    BREAST BIOPSY Right     BREAST REDUCTION SURGERY Bilateral     BREAST SURGERY      benign cyst removed from breast    GYN      tubal lig / hyst    ORTHOPEDIC SURGERY      left knee    OTHER SURGICAL HISTORY      tonsillectomy / ad    [place] ABDOMEN SURGERY PROC UNLISTED      exploratory     Family History   Problem Relation Age of Onset    Breast Cancer Mother     Breast Cancer Sister       Social History     Tobacco Use    Smoking status: Never    Smokeless tobacco: Never   Substance Use Topics    Alcohol use: No       Prior to Admission medications    Medication Sig Start Date End Date Taking? Authorizing Provider   chlorthalidone (HYGROTON) 25 MG tablet Take 25 mg by mouth daily    Historical Provider, MD   losartan (COZAAR) 50 MG tablet Take 50 mg by mouth daily    Historical Provider, MD   levothyroxine (SYNTHROID) 150 MCG tablet Take 150 mcg by mouth Daily    Historical Provider, MD   omeprazole (PRILOSEC) 20 MG delayed release capsule Take 20 mg by mouth daily    Historical Provider, MD   cetirizine (ZYRTEC) 10 MG tablet Take 10 mg by mouth daily    Historical Provider, MD   albuterol sulfate HFA (PROVENTIL;VENTOLIN;PROAIR) 108 (90 Base) MCG/ACT inhaler ProAir HFA 90 mcg/actuation aerosol inhaler   Inhale 2 puff(s) EVERY 4 HOURS as needed by inhalation route. Historical Provider, MD   amLODIPine (NORVASC) 10 MG tablet amlodipine 10 mg tablet   Take 1 tablet every day by oral route. Historical Provider, MD   fluticasone (FLONASE) 50 MCG/ACT nasal spray Flonase Allergy Relief 50 mcg/actuation nasal spray,suspension   Spray 1 spray twice a day by intranasal route. Historical Provider, MD   metoprolol tartrate (LOPRESSOR) 25 MG tablet Take 50 mg by mouth 2 times daily    Historical Provider, MD   vitamin D (CHOLECALCIFEROL) 25 MCG (1000 UT) TABS tablet Take 1,000 Units by mouth daily    Ar Automatic Reconciliation   insulin aspart (NOVOLOG) 100 UNIT/ML injection pen Inject 18 Units into the skin 3 times daily (before meals)    Ar Automatic Reconciliation   insulin glargine (LANTUS;BASAGLAR) 100 UNIT/ML injection pen Inject 55 Units into the skin nightly    Ar Automatic Reconciliation     Allergies   Allergen Reactions    Adhesive Tape Other (See Comments)     \"burns \" skin        REVIEW OF SYSTEMS:  CONSTITUTIONAL: Denies fever, decreased appetite, weight loss/gain, night sweats or fatigue. HEENT: Denies vision or hearing changes. denies glasses. denies hearing aids. CARDIAC: Denies CP, palpitations, rheumatic fever, murmur, peripheral edema, carotid artery disease or syncopal episodes. RESPIRATORY: Denies dyspnea on exertion, asthma, COPD or orthopnea. GI: Denies GERD, history of GI bleed or melena, PUD, hepatitis or cirrhosis. : Denies dysuria, hematuria. denies BPH symptoms. HEMATOLOGIC: Denies anemia or blood disorders. ENDOCRINE: Denies thyroid disease. MUSCULOSKELETAL: See HPI. NEUROLOGIC: Denies seizure, peripheral neuropathy or memory loss. PSYCH: Denies depression, anxiety or insomnia. SKIN: Denies rash or open sores. Objective:    PHYSICAL EXAM  GENERAL: No data found. EYES: PERRL. EOM intact. MOUTH:Teeth and Gums normal. NECK: Full ROM. Trachea midline. No thyromegaly or JVD. CARDIOVASCULAR: Regular rate and rhythm. No murmur or gallops. No carotid bruits. Peripheral pulses: radial 2 +, PT 2+, DP 2+ bilaterally. LUNGS: CTA bilaterally. No wheezes, rhonchi or rales. GI: positive BS. Abdomen nontender. NEUROLOGIC: Alert and oriented x 3. Bilateral equal strong had grasp and bilateral equal strong plantar flexion and dorsiflexion. GAIT: abnormal MUSCULOSKELETAL: ROM: full with pain. Tenderness: over the medial and lateral joint lines. Crepitus: present. SKIN: No rash, bruising, swelling, redness or warmth. Labs:  No results found for this or any previous visit (from the past 24 hour(s)). Xray Right knee: joint space narrowing with advanced degenerative changes. Assessment:  Advanced Right Knee Osteoarthritis. Robot assisted Total Right Knee Arthroplasty Indicated. Patient Active Problem List   Diagnosis    Acute CVA (cerebrovascular accident) ([address]    Primary osteoarthritis of right knee    Snoring    Noncompliance with CPAP treatment       Plan:  I have advised the patient of the risks and consequences, including possible complications of performing total joint replacement, as well as not doing this operation. The patient had the opportunity to ask questions and have them answered to their satisfaction.      Signed:  LU Talamantes 9/26/2022

## 2022-09-27 ENCOUNTER — HOSPITAL ENCOUNTER (OUTPATIENT)
Age: 62
Discharge: HOME | End: 2022-09-28
Attending: ORTHOPAEDIC SURGERY | Admitting: ORTHOPAEDIC SURGERY
Payer: OTHER GOVERNMENT

## 2022-09-27 ENCOUNTER — ANESTHESIA (OUTPATIENT)
Dept: SURGERY | Age: 62
End: 2022-09-27
Payer: OTHER GOVERNMENT

## 2022-09-27 DIAGNOSIS — Z96.651 TOTAL KNEE REPLACEMENT STATUS, RIGHT: Primary | ICD-10-CM

## 2022-09-27 DIAGNOSIS — M17.11 PRIMARY OSTEOARTHRITIS OF RIGHT KNEE: ICD-10-CM

## 2022-09-27 PROBLEM — E87.6 HYPOKALEMIA: Status: ACTIVE | Noted: 2022-09-27

## 2022-09-27 PROBLEM — E11.9 DM (DIABETES MELLITUS) (HCC): Status: ACTIVE | Noted: 2022-09-27

## 2022-09-27 PROBLEM — R56.9 SEIZURES (HCC): Status: ACTIVE | Noted: 2022-09-27

## 2022-09-27 PROBLEM — F44.5 FUNCTIONAL NEUROLOGICAL SYMPTOM DISORDER WITH ATTACKS OR SEIZURES: Status: ACTIVE | Noted: 2022-09-27

## 2022-09-27 PROBLEM — I10 HTN (HYPERTENSION): Status: ACTIVE | Noted: 2022-09-27

## 2022-09-27 LAB
ERYTHROCYTE [DISTWIDTH] IN BLOOD BY AUTOMATED COUNT: 14.9 % (ref 11.9–14.6)
GLUCOSE BLD STRIP.AUTO-MCNC: 156 MG/DL (ref 65–100)
GLUCOSE BLD STRIP.AUTO-MCNC: 157 MG/DL (ref 65–100)
GLUCOSE BLD STRIP.AUTO-MCNC: 222 MG/DL (ref 65–100)
HCT VFR BLD AUTO: 22.1 % (ref 35.8–46.3)
HCT VFR BLD AUTO: 34.1 % (ref 35.8–46.3)
HGB BLD-MCNC: 11.1 G/DL (ref 11.7–15.4)
HGB BLD-MCNC: 7 G/DL (ref 11.7–15.4)
MCH RBC QN AUTO: 27.7 PG (ref 26.1–32.9)
MCHC RBC AUTO-ENTMCNC: 31.7 G/DL (ref 31.4–35)
MCV RBC AUTO: 87.4 FL (ref 79.6–97.8)
NRBC # BLD: 0 K/UL (ref 0–0.2)
PLATELET # BLD AUTO: 225 K/UL (ref 150–450)
PMV BLD AUTO: 10.1 FL (ref 9.4–12.3)
POTASSIUM BLD-SCNC: 2.7 MMOL/L (ref 3.5–5.1)
POTASSIUM SERPL-SCNC: 2.7 MMOL/L (ref 3.5–5.1)
RBC # BLD AUTO: 2.53 M/UL (ref 4.05–5.2)
SERVICE CMNT-IMP: ABNORMAL
WBC # BLD AUTO: 5.5 K/UL (ref 4.3–11.1)

## 2022-09-27 PROCEDURE — 2580000003 HC RX 258: Performed by: PHYSICIAN ASSISTANT

## 2022-09-27 PROCEDURE — 3600000005 HC SURGERY LEVEL 5 BASE: Performed by: ORTHOPAEDIC SURGERY

## 2022-09-27 PROCEDURE — 97165 OT EVAL LOW COMPLEX 30 MIN: CPT

## 2022-09-27 PROCEDURE — 6360000002 HC RX W HCPCS: Performed by: NURSE ANESTHETIST, CERTIFIED REGISTERED

## 2022-09-27 PROCEDURE — 2709999900 HC NON-CHARGEABLE SUPPLY: Performed by: ORTHOPAEDIC SURGERY

## 2022-09-27 PROCEDURE — 2500000003 HC RX 250 WO HCPCS: Performed by: ANESTHESIOLOGY

## 2022-09-27 PROCEDURE — 3700000001 HC ADD 15 MINUTES (ANESTHESIA): Performed by: ORTHOPAEDIC SURGERY

## 2022-09-27 PROCEDURE — 2720000010 HC SURG SUPPLY STERILE: Performed by: ORTHOPAEDIC SURGERY

## 2022-09-27 PROCEDURE — 6370000000 HC RX 637 (ALT 250 FOR IP): Performed by: ORTHOPAEDIC SURGERY

## 2022-09-27 PROCEDURE — C1713 ANCHOR/SCREW BN/BN,TIS/BN: HCPCS | Performed by: ORTHOPAEDIC SURGERY

## 2022-09-27 PROCEDURE — 36415 COLL VENOUS BLD VENIPUNCTURE: CPT

## 2022-09-27 PROCEDURE — 6360000002 HC RX W HCPCS: Performed by: ANESTHESIOLOGY

## 2022-09-27 PROCEDURE — 2500000003 HC RX 250 WO HCPCS: Performed by: ORTHOPAEDIC SURGERY

## 2022-09-27 PROCEDURE — 3600000015 HC SURGERY LEVEL 5 ADDTL 15MIN: Performed by: ORTHOPAEDIC SURGERY

## 2022-09-27 PROCEDURE — 6360000002 HC RX W HCPCS: Performed by: PHYSICIAN ASSISTANT

## 2022-09-27 PROCEDURE — 97530 THERAPEUTIC ACTIVITIES: CPT

## 2022-09-27 PROCEDURE — 3700000000 HC ANESTHESIA ATTENDED CARE: Performed by: ORTHOPAEDIC SURGERY

## 2022-09-27 PROCEDURE — 85014 HEMATOCRIT: CPT

## 2022-09-27 PROCEDURE — 2700000000 HC OXYGEN THERAPY PER DAY

## 2022-09-27 PROCEDURE — 97161 PT EVAL LOW COMPLEX 20 MIN: CPT

## 2022-09-27 PROCEDURE — 27447 TOTAL KNEE ARTHROPLASTY: CPT | Performed by: ORTHOPAEDIC SURGERY

## 2022-09-27 PROCEDURE — 94760 N-INVAS EAR/PLS OXIMETRY 1: CPT

## 2022-09-27 PROCEDURE — 6370000000 HC RX 637 (ALT 250 FOR IP): Performed by: PHYSICIAN ASSISTANT

## 2022-09-27 PROCEDURE — 84132 ASSAY OF SERUM POTASSIUM: CPT

## 2022-09-27 PROCEDURE — 7100000000 HC PACU RECOVERY - FIRST 15 MIN: Performed by: ORTHOPAEDIC SURGERY

## 2022-09-27 PROCEDURE — 27447 TOTAL KNEE ARTHROPLASTY: CPT | Performed by: PHYSICIAN ASSISTANT

## 2022-09-27 PROCEDURE — 6360000002 HC RX W HCPCS: Performed by: FAMILY MEDICINE

## 2022-09-27 PROCEDURE — 2580000003 HC RX 258: Performed by: ANESTHESIOLOGY

## 2022-09-27 PROCEDURE — 2500000003 HC RX 250 WO HCPCS: Performed by: NURSE ANESTHETIST, CERTIFIED REGISTERED

## 2022-09-27 PROCEDURE — 85027 COMPLETE CBC AUTOMATED: CPT

## 2022-09-27 PROCEDURE — 20985 CPTR-ASST DIR MS PX: CPT | Performed by: ORTHOPAEDIC SURGERY

## 2022-09-27 PROCEDURE — 6360000002 HC RX W HCPCS: Performed by: ORTHOPAEDIC SURGERY

## 2022-09-27 PROCEDURE — 2500000003 HC RX 250 WO HCPCS: Performed by: PHYSICIAN ASSISTANT

## 2022-09-27 PROCEDURE — 2580000003 HC RX 258: Performed by: ORTHOPAEDIC SURGERY

## 2022-09-27 PROCEDURE — 94761 N-INVAS EAR/PLS OXIMETRY MLT: CPT

## 2022-09-27 PROCEDURE — 64447 NJX AA&/STRD FEMORAL NRV IMG: CPT | Performed by: ANESTHESIOLOGY

## 2022-09-27 PROCEDURE — 6370000000 HC RX 637 (ALT 250 FOR IP): Performed by: ANESTHESIOLOGY

## 2022-09-27 PROCEDURE — C1776 JOINT DEVICE (IMPLANTABLE): HCPCS | Performed by: ORTHOPAEDIC SURGERY

## 2022-09-27 PROCEDURE — 82962 GLUCOSE BLOOD TEST: CPT

## 2022-09-27 PROCEDURE — 97535 SELF CARE MNGMENT TRAINING: CPT

## 2022-09-27 PROCEDURE — 97110 THERAPEUTIC EXERCISES: CPT

## 2022-09-27 PROCEDURE — 7100000001 HC PACU RECOVERY - ADDTL 15 MIN: Performed by: ORTHOPAEDIC SURGERY

## 2022-09-27 PROCEDURE — 99232 SBSQ HOSP IP/OBS MODERATE 35: CPT | Performed by: PSYCHIATRY & NEUROLOGY

## 2022-09-27 DEVICE — ATTUNE KNEE SYSTEM TIBIAL INSERT ROTATING PLATFORM POSTERIOR STABILIZED 6 5MM AOX
Type: IMPLANTABLE DEVICE | Site: KNEE | Status: FUNCTIONAL
Brand: ATTUNE

## 2022-09-27 DEVICE — DEPUY CMW 2 FAST SET BONE CEMENT 20G: Type: IMPLANTABLE DEVICE | Site: KNEE | Status: FUNCTIONAL

## 2022-09-27 DEVICE — ATTUNE KNEE SYSTEM TIBIAL BASE POROCOAT ROTATING PLATFORM SIZE 4 CEMENTLESS
Type: IMPLANTABLE DEVICE | Site: KNEE | Status: FUNCTIONAL
Brand: ATTUNE

## 2022-09-27 DEVICE — KNEE K2 TOT HEMI ADV CMTLS IMPL CAPPED SYNTHES: Type: IMPLANTABLE DEVICE | Status: FUNCTIONAL

## 2022-09-27 DEVICE — ATTUNE PATELLA MEDIALIZED ANATOMIC 35MM CEMENTED AOX
Type: IMPLANTABLE DEVICE | Site: KNEE | Status: FUNCTIONAL
Brand: ATTUNE

## 2022-09-27 DEVICE — ATTUNE FEMORAL POROCOAT POSTERIOR STABILIZED SIZE 6N RIGHT CEMENTLESS
Type: IMPLANTABLE DEVICE | Site: KNEE | Status: FUNCTIONAL
Brand: ATTUNE

## 2022-09-27 RX ORDER — CELECOXIB 200 MG/1
200 CAPSULE ORAL DAILY
Status: DISCONTINUED | OUTPATIENT
Start: 2022-09-28 | End: 2022-09-28 | Stop reason: HOSPADM

## 2022-09-27 RX ORDER — POTASSIUM CHLORIDE 7.45 MG/ML
10 INJECTION INTRAVENOUS PRN
Status: DISCONTINUED | OUTPATIENT
Start: 2022-09-27 | End: 2022-09-28 | Stop reason: HOSPADM

## 2022-09-27 RX ORDER — ROPIVACAINE HYDROCHLORIDE 2 MG/ML
INJECTION, SOLUTION EPIDURAL; INFILTRATION; PERINEURAL PRN
Status: DISCONTINUED | OUTPATIENT
Start: 2022-09-27 | End: 2022-09-27 | Stop reason: HOSPADM

## 2022-09-27 RX ORDER — PROMETHAZINE HYDROCHLORIDE 25 MG/1
25 TABLET ORAL EVERY 6 HOURS PRN
Status: DISCONTINUED | OUTPATIENT
Start: 2022-09-27 | End: 2022-09-28 | Stop reason: HOSPADM

## 2022-09-27 RX ORDER — POTASSIUM CHLORIDE 20 MEQ/1
40 TABLET, EXTENDED RELEASE ORAL ONCE
Status: DISCONTINUED | OUTPATIENT
Start: 2022-09-27 | End: 2022-09-28 | Stop reason: HOSPADM

## 2022-09-27 RX ORDER — AMLODIPINE BESYLATE 10 MG/1
10 TABLET ORAL DAILY
Status: DISCONTINUED | OUTPATIENT
Start: 2022-09-27 | End: 2022-09-28 | Stop reason: HOSPADM

## 2022-09-27 RX ORDER — ROPIVACAINE HYDROCHLORIDE 2 MG/ML
INJECTION, SOLUTION EPIDURAL; INFILTRATION; PERINEURAL
Status: DISCONTINUED | OUTPATIENT
Start: 2022-09-27 | End: 2022-09-27 | Stop reason: SDUPTHER

## 2022-09-27 RX ORDER — HYDROMORPHONE HYDROCHLORIDE 2 MG/1
2 TABLET ORAL EVERY 4 HOURS PRN
Status: DISCONTINUED | OUTPATIENT
Start: 2022-09-27 | End: 2022-09-28 | Stop reason: HOSPADM

## 2022-09-27 RX ORDER — INSULIN GLARGINE 100 [IU]/ML
44 INJECTION, SOLUTION SUBCUTANEOUS NIGHTLY
Status: DISCONTINUED | OUTPATIENT
Start: 2022-09-27 | End: 2022-09-28 | Stop reason: HOSPADM

## 2022-09-27 RX ORDER — POTASSIUM CHLORIDE 20 MEQ/1
40 TABLET, EXTENDED RELEASE ORAL PRN
Status: DISCONTINUED | OUTPATIENT
Start: 2022-09-27 | End: 2022-09-28 | Stop reason: HOSPADM

## 2022-09-27 RX ORDER — INSULIN LISPRO 100 [IU]/ML
16 INJECTION, SOLUTION INTRAVENOUS; SUBCUTANEOUS
Status: DISCONTINUED | OUTPATIENT
Start: 2022-09-27 | End: 2022-09-28 | Stop reason: HOSPADM

## 2022-09-27 RX ORDER — ACETAMINOPHEN 500 MG
1000 TABLET ORAL ONCE
Status: DISCONTINUED | OUTPATIENT
Start: 2022-09-27 | End: 2022-09-27 | Stop reason: SDUPTHER

## 2022-09-27 RX ORDER — POTASSIUM CHLORIDE 7.45 MG/ML
10 INJECTION INTRAVENOUS
Status: DISCONTINUED | OUTPATIENT
Start: 2022-09-27 | End: 2022-09-27

## 2022-09-27 RX ORDER — SODIUM CHLORIDE 9 MG/ML
INJECTION, SOLUTION INTRAVENOUS PRN
Status: DISCONTINUED | OUTPATIENT
Start: 2022-09-27 | End: 2022-09-27

## 2022-09-27 RX ORDER — ONDANSETRON 2 MG/ML
INJECTION INTRAMUSCULAR; INTRAVENOUS PRN
Status: DISCONTINUED | OUTPATIENT
Start: 2022-09-27 | End: 2022-09-27 | Stop reason: SDUPTHER

## 2022-09-27 RX ORDER — DIPHENHYDRAMINE HCL 25 MG
25 CAPSULE ORAL EVERY 6 HOURS PRN
Status: DISCONTINUED | OUTPATIENT
Start: 2022-09-27 | End: 2022-09-28 | Stop reason: HOSPADM

## 2022-09-27 RX ORDER — PANTOPRAZOLE SODIUM 40 MG/1
40 TABLET, DELAYED RELEASE ORAL
Status: DISCONTINUED | OUTPATIENT
Start: 2022-09-28 | End: 2022-09-28 | Stop reason: HOSPADM

## 2022-09-27 RX ORDER — DIPHENHYDRAMINE HYDROCHLORIDE 50 MG/ML
12.5 INJECTION INTRAMUSCULAR; INTRAVENOUS
Status: DISCONTINUED | OUTPATIENT
Start: 2022-09-27 | End: 2022-09-27 | Stop reason: HOSPADM

## 2022-09-27 RX ORDER — EPHEDRINE SULFATE/0.9% NACL/PF 50 MG/5 ML
SYRINGE (ML) INTRAVENOUS PRN
Status: DISCONTINUED | OUTPATIENT
Start: 2022-09-27 | End: 2022-09-27 | Stop reason: SDUPTHER

## 2022-09-27 RX ORDER — CETIRIZINE HYDROCHLORIDE 10 MG/1
10 TABLET ORAL DAILY
Status: DISCONTINUED | OUTPATIENT
Start: 2022-09-27 | End: 2022-09-28 | Stop reason: HOSPADM

## 2022-09-27 RX ORDER — MIDAZOLAM HYDROCHLORIDE 2 MG/2ML
2 INJECTION, SOLUTION INTRAMUSCULAR; INTRAVENOUS ONCE
Status: COMPLETED | OUTPATIENT
Start: 2022-09-27 | End: 2022-09-27

## 2022-09-27 RX ORDER — SODIUM CHLORIDE 0.9 % (FLUSH) 0.9 %
5-40 SYRINGE (ML) INJECTION PRN
Status: DISCONTINUED | OUTPATIENT
Start: 2022-09-27 | End: 2022-09-28 | Stop reason: HOSPADM

## 2022-09-27 RX ORDER — ASPIRIN 81 MG/1
81 TABLET ORAL 2 TIMES DAILY
Status: DISCONTINUED | OUTPATIENT
Start: 2022-09-27 | End: 2022-09-28 | Stop reason: HOSPADM

## 2022-09-27 RX ORDER — LIDOCAINE HYDROCHLORIDE 10 MG/ML
1 INJECTION, SOLUTION INFILTRATION; PERINEURAL
Status: COMPLETED | OUTPATIENT
Start: 2022-09-27 | End: 2022-09-27

## 2022-09-27 RX ORDER — NALOXONE HYDROCHLORIDE 0.4 MG/ML
0.4 INJECTION, SOLUTION INTRAMUSCULAR; INTRAVENOUS; SUBCUTANEOUS PRN
Status: DISCONTINUED | OUTPATIENT
Start: 2022-09-27 | End: 2022-09-28 | Stop reason: HOSPADM

## 2022-09-27 RX ORDER — ONDANSETRON 2 MG/ML
4 INJECTION INTRAMUSCULAR; INTRAVENOUS EVERY 6 HOURS PRN
Status: DISCONTINUED | OUTPATIENT
Start: 2022-09-27 | End: 2022-09-28 | Stop reason: HOSPADM

## 2022-09-27 RX ORDER — SODIUM CHLORIDE 9 MG/ML
INJECTION, SOLUTION INTRAVENOUS PRN
Status: DISCONTINUED | OUTPATIENT
Start: 2022-09-27 | End: 2022-09-27 | Stop reason: HOSPADM

## 2022-09-27 RX ORDER — SODIUM CHLORIDE 0.9 % (FLUSH) 0.9 %
5-40 SYRINGE (ML) INJECTION EVERY 12 HOURS SCHEDULED
Status: DISCONTINUED | OUTPATIENT
Start: 2022-09-27 | End: 2022-09-28 | Stop reason: HOSPADM

## 2022-09-27 RX ORDER — SODIUM CHLORIDE 9 MG/ML
INJECTION, SOLUTION INTRAVENOUS PRN
Status: DISCONTINUED | OUTPATIENT
Start: 2022-09-27 | End: 2022-09-28 | Stop reason: HOSPADM

## 2022-09-27 RX ORDER — SODIUM CHLORIDE 9 MG/ML
INJECTION, SOLUTION INTRAVENOUS CONTINUOUS
Status: DISCONTINUED | OUTPATIENT
Start: 2022-09-27 | End: 2022-09-28 | Stop reason: HOSPADM

## 2022-09-27 RX ORDER — SODIUM CHLORIDE 0.9 % (FLUSH) 0.9 %
5-40 SYRINGE (ML) INJECTION EVERY 12 HOURS SCHEDULED
Status: DISCONTINUED | OUTPATIENT
Start: 2022-09-27 | End: 2022-09-27

## 2022-09-27 RX ORDER — PROPOFOL 10 MG/ML
INJECTION, EMULSION INTRAVENOUS PRN
Status: DISCONTINUED | OUTPATIENT
Start: 2022-09-27 | End: 2022-09-27 | Stop reason: SDUPTHER

## 2022-09-27 RX ORDER — DIPHENHYDRAMINE HYDROCHLORIDE 50 MG/ML
25 INJECTION INTRAMUSCULAR; INTRAVENOUS EVERY 6 HOURS PRN
Status: DISCONTINUED | OUTPATIENT
Start: 2022-09-27 | End: 2022-09-28 | Stop reason: HOSPADM

## 2022-09-27 RX ORDER — HYDROMORPHONE HYDROCHLORIDE 1 MG/ML
0.5 INJECTION, SOLUTION INTRAMUSCULAR; INTRAVENOUS; SUBCUTANEOUS EVERY 5 MIN PRN
Status: COMPLETED | OUTPATIENT
Start: 2022-09-27 | End: 2022-09-27

## 2022-09-27 RX ORDER — SODIUM CHLORIDE 0.9 % (FLUSH) 0.9 %
5-40 SYRINGE (ML) INJECTION EVERY 12 HOURS SCHEDULED
Status: DISCONTINUED | OUTPATIENT
Start: 2022-09-27 | End: 2022-09-27 | Stop reason: HOSPADM

## 2022-09-27 RX ORDER — ALBUTEROL SULFATE 90 UG/1
2 AEROSOL, METERED RESPIRATORY (INHALATION) EVERY 4 HOURS PRN
Status: DISCONTINUED | OUTPATIENT
Start: 2022-09-27 | End: 2022-09-28 | Stop reason: HOSPADM

## 2022-09-27 RX ORDER — SODIUM CHLORIDE, SODIUM LACTATE, POTASSIUM CHLORIDE, CALCIUM CHLORIDE 600; 310; 30; 20 MG/100ML; MG/100ML; MG/100ML; MG/100ML
INJECTION, SOLUTION INTRAVENOUS CONTINUOUS
Status: DISCONTINUED | OUTPATIENT
Start: 2022-09-27 | End: 2022-09-27

## 2022-09-27 RX ORDER — FLUTICASONE PROPIONATE 50 MCG
2 SPRAY, SUSPENSION (ML) NASAL DAILY
Status: DISCONTINUED | OUTPATIENT
Start: 2022-09-28 | End: 2022-09-28 | Stop reason: HOSPADM

## 2022-09-27 RX ORDER — SODIUM CHLORIDE, SODIUM LACTATE, POTASSIUM CHLORIDE, CALCIUM CHLORIDE 600; 310; 30; 20 MG/100ML; MG/100ML; MG/100ML; MG/100ML
INJECTION, SOLUTION INTRAVENOUS CONTINUOUS
Status: DISCONTINUED | OUTPATIENT
Start: 2022-09-27 | End: 2022-09-28 | Stop reason: HOSPADM

## 2022-09-27 RX ORDER — TRANEXAMIC ACID 100 MG/ML
INJECTION, SOLUTION INTRAVENOUS PRN
Status: DISCONTINUED | OUTPATIENT
Start: 2022-09-27 | End: 2022-09-27 | Stop reason: SDUPTHER

## 2022-09-27 RX ORDER — CHLORTHALIDONE 25 MG/1
25 TABLET ORAL DAILY
Status: DISCONTINUED | OUTPATIENT
Start: 2022-09-27 | End: 2022-09-28 | Stop reason: HOSPADM

## 2022-09-27 RX ORDER — MAGNESIUM HYDROXIDE/ALUMINUM HYDROXICE/SIMETHICONE 120; 1200; 1200 MG/30ML; MG/30ML; MG/30ML
15 SUSPENSION ORAL EVERY 6 HOURS PRN
Status: DISCONTINUED | OUTPATIENT
Start: 2022-09-27 | End: 2022-09-28 | Stop reason: HOSPADM

## 2022-09-27 RX ORDER — FENTANYL CITRATE 50 UG/ML
100 INJECTION, SOLUTION INTRAMUSCULAR; INTRAVENOUS
Status: COMPLETED | OUTPATIENT
Start: 2022-09-27 | End: 2022-09-27

## 2022-09-27 RX ORDER — LEVOTHYROXINE SODIUM 0.07 MG/1
150 TABLET ORAL
Status: DISCONTINUED | OUTPATIENT
Start: 2022-09-28 | End: 2022-09-28 | Stop reason: HOSPADM

## 2022-09-27 RX ORDER — ACETAMINOPHEN 325 MG/1
650 TABLET ORAL EVERY 6 HOURS
Status: DISCONTINUED | OUTPATIENT
Start: 2022-09-27 | End: 2022-09-28 | Stop reason: HOSPADM

## 2022-09-27 RX ORDER — ACETAMINOPHEN 500 MG
1000 TABLET ORAL ONCE
Status: COMPLETED | OUTPATIENT
Start: 2022-09-27 | End: 2022-09-27

## 2022-09-27 RX ORDER — HYDROMORPHONE HYDROCHLORIDE 4 MG/1
4 TABLET ORAL EVERY 4 HOURS PRN
Status: DISCONTINUED | OUTPATIENT
Start: 2022-09-27 | End: 2022-09-28 | Stop reason: HOSPADM

## 2022-09-27 RX ORDER — SODIUM CHLORIDE 0.9 % (FLUSH) 0.9 %
5-40 SYRINGE (ML) INJECTION PRN
Status: DISCONTINUED | OUTPATIENT
Start: 2022-09-27 | End: 2022-09-27 | Stop reason: HOSPADM

## 2022-09-27 RX ORDER — DEXTROSE MONOHYDRATE 100 MG/ML
INJECTION, SOLUTION INTRAVENOUS CONTINUOUS PRN
Status: DISCONTINUED | OUTPATIENT
Start: 2022-09-27 | End: 2022-09-27 | Stop reason: HOSPADM

## 2022-09-27 RX ORDER — MIDAZOLAM HYDROCHLORIDE 1 MG/ML
INJECTION INTRAMUSCULAR; INTRAVENOUS PRN
Status: DISCONTINUED | OUTPATIENT
Start: 2022-09-27 | End: 2022-09-27 | Stop reason: SDUPTHER

## 2022-09-27 RX ORDER — OXYCODONE HYDROCHLORIDE 5 MG/1
5 TABLET ORAL
Status: DISCONTINUED | OUTPATIENT
Start: 2022-09-27 | End: 2022-09-27 | Stop reason: HOSPADM

## 2022-09-27 RX ORDER — PROCHLORPERAZINE EDISYLATE 5 MG/ML
5 INJECTION INTRAMUSCULAR; INTRAVENOUS
Status: DISCONTINUED | OUTPATIENT
Start: 2022-09-27 | End: 2022-09-27 | Stop reason: HOSPADM

## 2022-09-27 RX ORDER — LOSARTAN POTASSIUM 50 MG/1
50 TABLET ORAL DAILY
Status: DISCONTINUED | OUTPATIENT
Start: 2022-09-27 | End: 2022-09-28 | Stop reason: HOSPADM

## 2022-09-27 RX ORDER — SODIUM CHLORIDE 0.9 % (FLUSH) 0.9 %
5-40 SYRINGE (ML) INJECTION PRN
Status: DISCONTINUED | OUTPATIENT
Start: 2022-09-27 | End: 2022-09-27

## 2022-09-27 RX ORDER — SENNA AND DOCUSATE SODIUM 50; 8.6 MG/1; MG/1
1 TABLET, FILM COATED ORAL 2 TIMES DAILY
Status: DISCONTINUED | OUTPATIENT
Start: 2022-09-27 | End: 2022-09-28 | Stop reason: HOSPADM

## 2022-09-27 RX ORDER — ONDANSETRON 2 MG/ML
4 INJECTION INTRAMUSCULAR; INTRAVENOUS
Status: DISCONTINUED | OUTPATIENT
Start: 2022-09-27 | End: 2022-09-27 | Stop reason: HOSPADM

## 2022-09-27 RX ORDER — KETOROLAC TROMETHAMINE 30 MG/ML
INJECTION, SOLUTION INTRAMUSCULAR; INTRAVENOUS PRN
Status: DISCONTINUED | OUTPATIENT
Start: 2022-09-27 | End: 2022-09-27 | Stop reason: HOSPADM

## 2022-09-27 RX ORDER — MIDAZOLAM HYDROCHLORIDE 2 MG/2ML
2 INJECTION, SOLUTION INTRAMUSCULAR; INTRAVENOUS
Status: COMPLETED | OUTPATIENT
Start: 2022-09-27 | End: 2022-09-27

## 2022-09-27 RX ORDER — KETAMINE HYDROCHLORIDE 50 MG/ML
INJECTION, SOLUTION, CONCENTRATE INTRAMUSCULAR; INTRAVENOUS PRN
Status: DISCONTINUED | OUTPATIENT
Start: 2022-09-27 | End: 2022-09-27 | Stop reason: SDUPTHER

## 2022-09-27 RX ADMIN — INSULIN GLARGINE 44 UNITS: 100 INJECTION, SOLUTION SUBCUTANEOUS at 21:28

## 2022-09-27 RX ADMIN — METOPROLOL TARTRATE 25 MG: 25 TABLET, FILM COATED ORAL at 21:03

## 2022-09-27 RX ADMIN — PHENYLEPHRINE HYDROCHLORIDE 100 MCG: 0.1 INJECTION, SOLUTION INTRAVENOUS at 08:28

## 2022-09-27 RX ADMIN — HYDROMORPHONE HYDROCHLORIDE 0.5 MG: 1 INJECTION, SOLUTION INTRAMUSCULAR; INTRAVENOUS; SUBCUTANEOUS at 11:23

## 2022-09-27 RX ADMIN — HYDROMORPHONE HYDROCHLORIDE 0.5 MG: 1 INJECTION, SOLUTION INTRAMUSCULAR; INTRAVENOUS; SUBCUTANEOUS at 11:18

## 2022-09-27 RX ADMIN — PHENYLEPHRINE HYDROCHLORIDE 100 MCG: 0.1 INJECTION, SOLUTION INTRAVENOUS at 07:57

## 2022-09-27 RX ADMIN — HYDROMORPHONE HYDROCHLORIDE 2 MG: 2 TABLET ORAL at 21:15

## 2022-09-27 RX ADMIN — KETAMINE HYDROCHLORIDE 10 MG: 50 INJECTION, SOLUTION INTRAMUSCULAR; INTRAVENOUS at 08:16

## 2022-09-27 RX ADMIN — SODIUM CHLORIDE: 9 INJECTION, SOLUTION INTRAVENOUS at 21:08

## 2022-09-27 RX ADMIN — HYDROMORPHONE HYDROCHLORIDE 0.5 MG: 1 INJECTION, SOLUTION INTRAMUSCULAR; INTRAVENOUS; SUBCUTANEOUS at 10:43

## 2022-09-27 RX ADMIN — ACETAMINOPHEN 650 MG: 325 TABLET, FILM COATED ORAL at 17:04

## 2022-09-27 RX ADMIN — CETIRIZINE HYDROCHLORIDE 10 MG: 10 TABLET ORAL at 17:04

## 2022-09-27 RX ADMIN — KETAMINE HYDROCHLORIDE 10 MG: 50 INJECTION, SOLUTION INTRAMUSCULAR; INTRAVENOUS at 08:41

## 2022-09-27 RX ADMIN — POTASSIUM BICARBONATE 40 MEQ: 782 TABLET, EFFERVESCENT ORAL at 06:50

## 2022-09-27 RX ADMIN — PHENYLEPHRINE HYDROCHLORIDE 100 MCG: 0.1 INJECTION, SOLUTION INTRAVENOUS at 08:41

## 2022-09-27 RX ADMIN — MEPIVACAINE HYDROCHLORIDE 60 MG: 20 INJECTION, SOLUTION EPIDURAL; INFILTRATION at 07:12

## 2022-09-27 RX ADMIN — SENNOSIDES AND DOCUSATE SODIUM 1 TABLET: 50; 8.6 TABLET ORAL at 21:02

## 2022-09-27 RX ADMIN — MIDAZOLAM 2 MG: 1 INJECTION, SOLUTION INTRAMUSCULAR; INTRAVENOUS at 06:56

## 2022-09-27 RX ADMIN — LIDOCAINE HYDROCHLORIDE 1 ML: 10 INJECTION, SOLUTION INFILTRATION; PERINEURAL at 06:30

## 2022-09-27 RX ADMIN — PHENYLEPHRINE HYDROCHLORIDE 100 MCG: 0.1 INJECTION, SOLUTION INTRAVENOUS at 07:42

## 2022-09-27 RX ADMIN — ONDANSETRON 4 MG: 2 INJECTION INTRAMUSCULAR; INTRAVENOUS at 09:05

## 2022-09-27 RX ADMIN — Medication 10 MG: at 09:10

## 2022-09-27 RX ADMIN — CHLORTHALIDONE 25 MG: 25 TABLET ORAL at 17:04

## 2022-09-27 RX ADMIN — SODIUM CHLORIDE, SODIUM LACTATE, POTASSIUM CHLORIDE, AND CALCIUM CHLORIDE: 600; 310; 30; 20 INJECTION, SOLUTION INTRAVENOUS at 06:30

## 2022-09-27 RX ADMIN — PROPOFOL 50 MG: 10 INJECTION, EMULSION INTRAVENOUS at 07:30

## 2022-09-27 RX ADMIN — KETAMINE HYDROCHLORIDE 20 MG: 50 INJECTION, SOLUTION INTRAMUSCULAR; INTRAVENOUS at 07:30

## 2022-09-27 RX ADMIN — HYDROMORPHONE HYDROCHLORIDE 2 MG: 2 TABLET ORAL at 17:04

## 2022-09-27 RX ADMIN — ACETAMINOPHEN 1000 MG: 500 TABLET, FILM COATED ORAL at 06:04

## 2022-09-27 RX ADMIN — HYDROMORPHONE HYDROCHLORIDE 0.5 MG: 1 INJECTION, SOLUTION INTRAMUSCULAR; INTRAVENOUS; SUBCUTANEOUS at 10:38

## 2022-09-27 RX ADMIN — PROPOFOL 100 MCG/KG/MIN: 10 INJECTION, EMULSION INTRAVENOUS at 07:31

## 2022-09-27 RX ADMIN — INSULIN LISPRO 16 UNITS: 100 INJECTION, SOLUTION INTRAVENOUS; SUBCUTANEOUS at 17:05

## 2022-09-27 RX ADMIN — MIDAZOLAM HYDROCHLORIDE 2 MG: 1 INJECTION, SOLUTION INTRAMUSCULAR; INTRAVENOUS at 10:00

## 2022-09-27 RX ADMIN — POTASSIUM CHLORIDE 10 MEQ: 7.46 INJECTION, SOLUTION INTRAVENOUS at 21:02

## 2022-09-27 RX ADMIN — TRANEXAMIC ACID 1000 MG: 100 INJECTION, SOLUTION INTRAVENOUS at 07:30

## 2022-09-27 RX ADMIN — Medication 10 MG: at 09:01

## 2022-09-27 RX ADMIN — ROPIVACAINE HYDROCHLORIDE 20 ML: 2 INJECTION, SOLUTION EPIDURAL; INFILTRATION at 06:55

## 2022-09-27 RX ADMIN — CEFAZOLIN 2000 MG: 10 INJECTION, POWDER, FOR SOLUTION INTRAVENOUS at 21:16

## 2022-09-27 RX ADMIN — ASPIRIN 81 MG: 81 TABLET, COATED ORAL at 21:02

## 2022-09-27 RX ADMIN — MIDAZOLAM 2 MG: 1 INJECTION INTRAMUSCULAR; INTRAVENOUS at 09:35

## 2022-09-27 RX ADMIN — FENTANYL CITRATE 100 MCG: 50 INJECTION INTRAMUSCULAR; INTRAVENOUS at 06:56

## 2022-09-27 RX ADMIN — POTASSIUM CHLORIDE 10 MEQ: 7.46 INJECTION, SOLUTION INTRAVENOUS at 06:59

## 2022-09-27 RX ADMIN — POTASSIUM CHLORIDE 10 MEQ: 7.46 INJECTION, SOLUTION INTRAVENOUS at 22:56

## 2022-09-27 RX ADMIN — PHENYLEPHRINE HYDROCHLORIDE 100 MCG: 0.1 INJECTION, SOLUTION INTRAVENOUS at 08:14

## 2022-09-27 RX ADMIN — Medication 2 G: at 07:04

## 2022-09-27 RX ADMIN — CEFAZOLIN 2000 MG: 10 INJECTION, POWDER, FOR SOLUTION INTRAVENOUS at 17:03

## 2022-09-27 RX ADMIN — SODIUM CHLORIDE, SODIUM LACTATE, POTASSIUM CHLORIDE, AND CALCIUM CHLORIDE: 600; 310; 30; 20 INJECTION, SOLUTION INTRAVENOUS at 08:15

## 2022-09-27 ASSESSMENT — PAIN SCALES - WONG BAKER
WONGBAKER_NUMERICALRESPONSE: 0
WONGBAKER_NUMERICALRESPONSE: 2

## 2022-09-27 ASSESSMENT — PAIN DESCRIPTION - LOCATION
LOCATION: KNEE
LOCATION: KNEE
LOCATION: INCISION;KNEE

## 2022-09-27 ASSESSMENT — PAIN - FUNCTIONAL ASSESSMENT: PAIN_FUNCTIONAL_ASSESSMENT: NONE - DENIES PAIN

## 2022-09-27 ASSESSMENT — PAIN SCALES - GENERAL
PAINLEVEL_OUTOF10: 8
PAINLEVEL_OUTOF10: 7
PAINLEVEL_OUTOF10: 7
PAINLEVEL_OUTOF10: 8
PAINLEVEL_OUTOF10: 8
PAINLEVEL_OUTOF10: 2
PAINLEVEL_OUTOF10: 4

## 2022-09-27 ASSESSMENT — PAIN DESCRIPTION - ORIENTATION
ORIENTATION: RIGHT
ORIENTATION: RIGHT

## 2022-09-27 ASSESSMENT — PAIN DESCRIPTION - DESCRIPTORS: DESCRIPTORS: ACHING

## 2022-09-27 NOTE — PERIOP NOTE
Dr. Molly Perry at bedside. Pt head and arms shaking and appears to be having a seizure. Verbal orders given Omid Carrera CRNA for IV versed.

## 2022-09-27 NOTE — ANESTHESIA PRE PROCEDURE
Department of Anesthesiology  Preprocedure Note       Name:  Merary Alexander   Age:  60 y.o.  :  1960                                          MRN:  [id number]         Date:  2022      Surgeon: Luisa Martinez):  Radha Bob MD    Procedure: Procedure(s):  KNEE TOTAL ARTHROPLASTY ROBOTIC/ RIGHT    Medications prior to admission:   Prior to Admission medications    Medication Sig Start Date End Date Taking? Authorizing Provider   chlorthalidone (HYGROTON) 25 MG tablet Take 25 mg by mouth daily    Historical Provider, MD   losartan (COZAAR) 50 MG tablet Take 50 mg by mouth daily    Historical Provider, MD   levothyroxine (SYNTHROID) 150 MCG tablet Take 150 mcg by mouth Daily    Historical Provider, MD   omeprazole (PRILOSEC) 20 MG delayed release capsule Take 20 mg by mouth daily    Historical Provider, MD   cetirizine (ZYRTEC) 10 MG tablet Take 10 mg by mouth daily    Historical Provider, MD   albuterol sulfate HFA (PROVENTIL;VENTOLIN;PROAIR) 108 (90 Base) MCG/ACT inhaler ProAir HFA 90 mcg/actuation aerosol inhaler   Inhale 2 puff(s) EVERY 4 HOURS as needed by inhalation route. Historical Provider, MD   amLODIPine (NORVASC) 10 MG tablet amlodipine 10 mg tablet   Take 1 tablet every day by oral route. Historical Provider, MD   fluticasone (FLONASE) 50 MCG/ACT nasal spray Flonase Allergy Relief 50 mcg/actuation nasal spray,suspension   Spray 1 spray twice a day by intranasal route.     Historical Provider, MD   metoprolol tartrate (LOPRESSOR) 25 MG tablet Take 50 mg by mouth 2 times daily    Historical Provider, MD   vitamin D (CHOLECALCIFEROL) 25 MCG (1000 UT) TABS tablet Take 1,000 Units by mouth daily    Ar Automatic Reconciliation   insulin aspart (NOVOLOG) 100 UNIT/ML injection pen Inject 16 Units into the skin 3 times daily (before meals)    Ar Automatic Reconciliation   insulin glargine (LANTUS;BASAGLAR) 100 UNIT/ML injection pen Inject 55 Units into the skin nightly    Ar Automatic Reconciliation       Current medications:    Current Facility-Administered Medications   Medication Dose Route Frequency Provider Last Rate Last Admin    fentaNYL (SUBLIMAZE) injection 100 mcg  100 mcg IntraVENous Once PRN Waleska Varma MD        lactated ringers infusion   IntraVENous Continuous Waleska Varma  mL/hr at 09/27/22 0630 New Bag at 09/27/22 0630    sodium chloride flush 0.9 % injection 5-40 mL  5-40 mL IntraVENous 2 times per day Waleska Varma MD        sodium chloride flush 0.9 % injection 5-40 mL  5-40 mL IntraVENous PRN Waleska Varma MD        0.9 % sodium chloride infusion   IntraVENous PRN Waleska Varma MD        midazolam PF (VERSED) injection 2 mg  2 mg IntraVENous Once PRN Waleska Varma MD        ceFAZolin (ANCEF) 2000 mg in sterile water 20 mL IV syringe  2,000 mg IntraVENous On Call to [address] [place] [address] Rd, [place]           Allergies:     Allergies   Allergen Reactions    Adhesive Tape Other (See Comments)     \"burns \" skin       Problem List:    Patient Active Problem List   Diagnosis Code    Acute CVA (cerebrovascular accident) ([facility] [address] I63.9    Primary osteoarthritis of right knee M17.11    Snoring R06.83    Noncompliance with CPAP treatment Z91.14    Arthritis of right knee M17.11       Past Medical History:        Diagnosis Date    Chronic cough     believes to be related to lisinopril, medication changed 8/30/22    Diabetes ([facility] [address]     NIDDM, average 's, symptomatic for hypoglycemia in the 60's, A1c 7.0 on 9/12/22    Endocrine disease     Environmental allergies     PRN inhaler--last used 9/8/22    Hypertension     Other ill-defined conditions(799.89)     heart murmur    Other ill-defined conditions(799.89)     anemia    Seizures ([address]     one time after anesthesia ~ 15 years ago    Sleep disorder     CPAP    TIA (transient ischemic attack) 04/2021       Past Surgical History:        Procedure Laterality Date    BREAST BIOPSY Right     BREAST REDUCTION SURGERY Bilateral     BREAST SURGERY      benign cyst removed from breast    GYN      tubal lig / hyst    ORTHOPEDIC SURGERY      left knee    OTHER SURGICAL HISTORY      tonsillectomy / ad    [place] ABDOMEN SURGERY PROC UNLISTED      exploratory       Social History:    Social History     Tobacco Use    Smoking status: Never    Smokeless tobacco: Never   Substance Use Topics    Alcohol use: No                                Counseling given: Not Answered      Vital Signs (Current):   Vitals:    09/27/22 0515   BP: (!) 146/80   Pulse: 64   Resp: 18   Temp: 97.7 °F (36.5 °C)   TempSrc: Temporal   SpO2: 97%   Weight: 223 lb 3.2 oz (101.2 kg)                                              BP Readings from Last 3 Encounters:   09/27/22 (!) 146/80   09/12/22 127/76   06/14/22 135/86       NPO Status: Time of last liquid consumption: 2100                                                 Date of last liquid consumption: 09/26/22                        Date of last solid food consumption: 09/26/22    BMI:   Wt Readings from Last 3 Encounters:   09/27/22 223 lb 3.2 oz (101.2 kg)   09/12/22 221 lb (100.2 kg)   08/30/22 223 lb (101.2 kg)     Body mass index is 38.31 kg/m².     CBC:   Lab Results   Component Value Date/Time    WBC 9.5 09/12/2022 08:40 AM    RBC 4.36 09/12/2022 08:40 AM    HGB 11.8 09/12/2022 08:40 AM    HCT 35.7 09/12/2022 08:40 AM    MCV 81.9 09/12/2022 08:40 AM    RDW 14.7 09/12/2022 08:40 AM     09/12/2022 08:40 AM       CMP:   Lab Results   Component Value Date/Time     09/12/2022 08:40 AM    K 3.2 09/12/2022 08:40 AM     09/12/2022 08:40 AM    CO2 28 09/12/2022 08:40 AM    BUN 9 09/12/2022 08:40 AM    CREATININE 0.90 09/12/2022 08:40 AM    GFRAA >60 09/12/2022 08:40 AM    AGRATIO 0.9 01/03/2021 08:50 PM    LABGLOM >60 09/12/2022 08:40 AM    GLUCOSE 123 09/12/2022 08:40 AM    PROT 7.6 01/03/2021 08:50 PM    CALCIUM 8.8 09/12/2022 08:40 AM    BILITOT 0.2 01/03/2021 08:50 PM ALKPHOS 117 01/03/2021 08:50 PM    AST 24 01/03/2021 08:50 PM    ALT 47 01/03/2021 08:50 PM       POC Tests:   Recent Labs     09/27/22  0619   POCK 2.7*       Coags:   Lab Results   Component Value Date/Time    PROTIME 13.4 09/13/2022 09:22 AM    INR 1.0 09/13/2022 09:22 AM    APTT 26.8 09/13/2022 09:22 AM       HCG (If Applicable): No results found for: PREGTESTUR, PREGSERUM, HCG, HCGQUANT     ABGs: No results found for: PHART, PO2ART, EYN0BGT, ABA4OIS, BEART, P5YRLNHY     Type & Screen (If Applicable):  No results found for: LABABO, LABRH    Drug/Infectious Status (If Applicable):  No results found for: HIV, HEPCAB    COVID-19 Screening (If Applicable): No results found for: COVID19        Anesthesia Evaluation  Patient summary reviewed and Nursing notes reviewed history of anesthetic complications (slow to wake up): Airway: Mallampati: II  TM distance: >3 FB   Neck ROM: full  Mouth opening: > = 3 FB   Dental:      Comment: Crowns/fillings    Pulmonary:normal exam    (+) sleep apnea (Snores, no CPAP):                            ROS comment: Reactive airway   Cardiovascular:    (+) hypertension:, valvular problems/murmurs (Causes no issues):, murmur,         Rhythm: regular  Rate: normal                    Neuro/Psych:   (+) seizures (one time after anesthesia, no further issues):, TIA,             GI/Hepatic/Renal:   (+) GERD:,          ROS comment: Obesity. Endo/Other:    (+) DiabetesType II DM, , hypothyroidism, blood dyscrasia: anemia:., electrolyte abnormalities (hypokalemia), . Abdominal:             Vascular: Other Findings:           Anesthesia Plan      spinal     ASA 3     (Spinal with Mepiv + nerve blocks    K 2.7 this AM. Patient on chlorthalidone. Will replace with PO and IV K and proceed with surgery)  Induction: intravenous. MIPS: Postoperative opioids intended. Anesthetic plan and risks discussed with patient.     Use of blood products discussed with patient whom

## 2022-09-27 NOTE — ANESTHESIA PROCEDURE NOTES
Peripheral Block    Patient location during procedure: pre-op  Reason for block: post-op pain management and at surgeon's request  Start time: 9/27/2022 6:55 AM  End time: 9/27/2022 6:58 AM  Staffing  Performed: anesthesiologist   Anesthesiologist: Blank Leslie MD  Preanesthetic Checklist  Completed: patient identified, IV checked, site marked, risks and benefits discussed, surgical/procedural consents, equipment checked, pre-op evaluation, timeout performed, anesthesia consent given, oxygen available and monitors applied/VS acknowledged  Peripheral Block   Patient position: supine  Prep: ChloraPrep  Provider prep: sterile gloves and mask  Patient monitoring: cardiac monitor, continuous pulse ox, frequent blood pressure checks, IV access, oxygen and responsive to questions  Block type: Femoral  Adductor canal  Laterality: right  Injection technique: single-shot  Guidance: ultrasound guided    Needle   Needle type: insulated echogenic nerve stimulator needle   Needle gauge: 20 G  Needle localization: ultrasound guidance  Needle length: 10 cm  Assessment   Injection assessment: negative aspiration for heme, no paresthesia on injection, local visualized surrounding nerve on ultrasound and no intravascular symptoms  Paresthesia pain: none  Slow fractionated injection: yes  Hemodynamics: stable  Real-time US image taken/store: yes  Outcomes: uncomplicated and patient tolerated procedure well    Additional Notes  Ultrasound image taken and stored in chart   Medications Administered  dexamethasone 4 MG/ML - Perineural   4 mg - 9/27/2022 6:55:00 AM  ropivacaine (NAROPIN) injection 0.2% - Perineural   20 mL - 9/27/2022 6:55:00 AM

## 2022-09-27 NOTE — ANESTHESIA POSTPROCEDURE EVALUATION
Department of Anesthesiology  Postprocedure Note    Patient: Yasir Ray  MRN: [id number]  YOB: 1960  Date of evaluation: 9/27/2022      Procedure Summary     Date: 09/27/22 [place] [facility] [place] [place] [facility] [place] [facility] [place] [place]    Anesthesia Start: 1644 Anesthesia Stop: 6117    Procedure: KNEE TOTAL ARTHROPLASTY ROBOTIC/ RIGHT (Right) Diagnosis:       Primary osteoarthritis of right knee      (Primary osteoarthritis of right knee [M17.11])    Surgeons: Humera Andrews MD Responsible Provider: Blank Leslie MD    Anesthesia Type: spinal ASA Status: 3          Anesthesia Type: No value filed. Adelina Phase I: Adelina Score: 8    Adelina Phase II:        Anesthesia Post Evaluation    Patient location during evaluation: PACU  Patient participation: complete - patient participated  Level of consciousness: sleepy but conscious  Airway patency: patent  Nausea: well controlled. Complications: no  Cardiovascular status: acceptable. Respiratory status: acceptable  Hydration status: stable  Comments: Patient with history of \"seizure\" after anesthesia about 15 years ago. After arrival to PACU, patient was noted to have nonrhythmic arm and head movements concerning for seizure. 2mg IV Versed given with resolution of symptoms. Vital signs were stable throughout episode. Neurology was consulted. Patient had several other episodes like this during her PACU stay and received an additional dose of 2mg IV Versed. Vital signs always remained stable. One event was observed by the Neurologist. 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 Please see his consult note for details. During first \"seizure-like\" episode, labs drawn. CBC that was drawn appears to be dilutional and incorrect as all the values are lower than previous. EBL during procedure was only ~150cc.

## 2022-09-27 NOTE — PROGRESS NOTES
Pt resting well in bed with no further signs of shaking. VSS. Pt voiding well. Appetite good. Pt alert and oriented. Call light in reach. Inst pt to call for any needs.

## 2022-09-27 NOTE — CARE COORDINATION
09/27/22 1622   Service Assessment   Patient Orientation Alert and Oriented;Person;Place;Situation;Self   Cognition Alert   History Provided By Patient   Primary Caregiver Self   Support Systems Spouse/Significant Other   Prior Functional Level Independent in ADLs/IADLs   Can patient return to prior living arrangement Yes   Ability to make needs known: Good   Family able to assist with home care needs: Yes   Social/Functional History   Lives With Spouse   Type of Home House   ADL Assistance Independent   Mode of Transportation Car   Discharge Planning   Current Services Prior To Admission None   Potential [facility] [address] Discharge   Transition of Care Consult (CM Consult) [facility] Discharge Home Health;PT   Confirm Follow Up Transport Family   Condition of Participation: Discharge Planning   The Plan for Transition of Care is related to the following treatment goals: Home with spouse and [facility]   The Patient and/or Patient Representative was provided with a Choice of Provider? Patient   The Patient and/Or Patient Representative agree with the Discharge Plan? Yes   Freedom of Choice list was provided with basic dialogue that supports the patient's individualized plan of care/goals, treatment preferences, and shares the quality data associated with the providers? Yes   CM met with patient for d/c planning. Patient is having nausea and family at bedside. Patient states that she has her DME walker and 3-1 BSC from [place] and CM contacted [facility] spoke with Jordon Hammer and she states they have referral and the liaison is working with [place] on consult. Neurology consulted for possible ? Seizure activity. CM will f/u in am to confirm and complete assessment. Current d/c plan is home with spouse and [facility] .

## 2022-09-27 NOTE — PERIOP NOTE
Dr. Valente Amaya at bedside to assess patient. Pt appears to be resting comfortably. Dr. Valente Amaya ok with patient being transferred to ortho floor for continuation of care.

## 2022-09-27 NOTE — INTERVAL H&P NOTE
Update History & Physical    The patient's History and Physical of September 26, 2022 was reviewed with the patient and I examined the patient. There was no change. The surgical site was confirmed by the patient and me. Plan: The risks, benefits, expected outcome, and alternative to the recommended procedure have been discussed with the patient. Patient understands and wants to proceed with the procedure.      Electronically signed by Prakash Mercado MD on 9/27/2022 at 6:42 AM

## 2022-09-27 NOTE — PROGRESS NOTES
TRANSFER - IN REPORT:    Verbal report received from [address] [place] [address] on Merary Alexander  being received from PACU for routine post-op      Report consisted of patient's Situation, Background, Assessment and   Recommendations(SBAR). Information from the following report(s) Surgery Report, Intake/Output, MAR, Recent Results, and Neuro Assessment was reviewed with the receiving nurse. Opportunity for questions and clarification was provided. Assessment completed upon patient's arrival to unit and care assumed.

## 2022-09-27 NOTE — PROGRESS NOTES
Consult    Patient: Eduard Ng MRN: [id number]     YOB: 1960  Age: 60 y.o. Sex: female      Subjective:      Eduard Ng is a 60 y.o. female who is being seen for seizure-like activity after surgery. The patient has a history of 1 seizure-like event 15 years ago after anesthesia. Neurology has been consulted because she is now having another event after knee surgery. Her eyes are currently closed. She is having nonrhythmic movements of the right upper limb which then sometimes spread into the left upper limb. The velocity and amplitude and frequency vary considerably. Sometimes her activity stops completely and then restarts within a moment. Nursing states that she periodically follows commands during these events. When her limbs are moved the movements stop.      Past Medical History:   Diagnosis Date    Chronic cough     believes to be related to lisinopril, medication changed 8/30/22    Diabetes ([facility] [address]     NIDDM, average 's, symptomatic for hypoglycemia in the 60's, A1c 7.0 on 9/12/22    Endocrine disease     Environmental allergies     PRN inhaler--last used 9/8/22    Hypertension     Other ill-defined conditions(799.89)     heart murmur    Other ill-defined conditions(799.89)     anemia    Seizures ([facility])     one time after anesthesia ~ 15 years ago    Sleep disorder     CPAP    TIA (transient ischemic attack) 04/2021     Past Surgical History:   Procedure Laterality Date    BREAST BIOPSY Right     BREAST REDUCTION SURGERY Bilateral     BREAST SURGERY      benign cyst removed from breast    GYN      tubal lig / hyst    ORTHOPEDIC SURGERY      left knee    OTHER SURGICAL HISTORY      tonsillectomy / ad    VA ABDOMEN SURGERY PROC UNLISTED      exploratory      Family History   Problem Relation Age of Onset    Breast Cancer Mother     Breast Cancer Sister      Social History     Tobacco Use    Smoking status: Never    Smokeless tobacco: Never   Substance Use Topics Alcohol use: No      Current Facility-Administered Medications   Medication Dose Route Frequency Provider Last Rate Last Admin    lactated ringers infusion   IntraVENous Continuous Reita End,  mL/hr at 09/27/22 0704 New Bag at 09/27/22 0815    sodium chloride flush 0.9 % injection 5-40 mL  5-40 mL IntraVENous 2 times per day Reita End, MD        sodium chloride flush 0.9 % injection 5-40 mL  5-40 mL IntraVENous PRN Reita End, MD        0.9 % sodium chloride infusion   IntraVENous PRN Reita End, MD        lactated ringers infusion   IntraVENous Continuous Reita End, MD        sodium chloride flush 0.9 % injection 5-40 mL  5-40 mL IntraVENous 2 times per day Reita End, MD        sodium chloride flush 0.9 % injection 5-40 mL  5-40 mL IntraVENous PRN Reita End, MD        0.9 % sodium chloride infusion   IntraVENous PRN Reita End, MD        HYDROmorphone HCl PF (DILAUDID) injection 0.5 mg  0.5 mg IntraVENous Q5 Min PRN Reita End, MD        oxyCODONE (ROXICODONE) immediate release tablet 5 mg  5 mg Oral Once PRN Reita End, MD        ondansetron (ZOFRAN) injection 4 mg  4 mg IntraVENous Once PRN Reita End, MD        prochlorperazine (COMPAZINE) injection 5 mg  5 mg IntraVENous Once PRN Reita End, MD        diphenhydrAMINE (BENADRYL) injection 12.5 mg  12.5 mg IntraVENous Once PRN Reita End, MD        glucose chewable tablet 16 g  4 tablet Oral PRN Reita End, MD        dextrose bolus 10% 125 mL  125 mL IntraVENous PRN Reita End, MD        Or    dextrose bolus 10% 250 mL  250 mL IntraVENous PRN Reita End, MD        glucagon (rDNA) injection 1 mg  1 mg SubCUTAneous PRN Reita End, MD        dextrose 10 % infusion   IntraVENous Continuous PRN Reita End, MD        tranexamic acid (CYKLOKAPRON) 1,000 mg, sodium chloride 0.9 % 50 mL    PRN Clearnce MD Dania   60 mL at 09/27/22 0856    ketorolac (TORADOL) injection    PRN Charyl Cable Larena Schwab, MD   30 mg at 09/27/22 0840    ropivacaine (NAROPIN) 0.2% injection 0.2%    PRN Radha Pearson MD   60 mL at 09/27/22 0840        Allergies   Allergen Reactions    Adhesive Tape Other (See Comments)     \"burns \" skin       Review of Systems:  Could not obtain due to altered mental status      Objective:     Vitals:    09/27/22 0515 09/27/22 0657 09/27/22 0700 09/27/22 0926   BP: (!) 146/80 (!) 140/84 113/68 113/66   Pulse: 64 71 72 66   Resp: 18 18 16 16   Temp: 97.7 °F (36.5 °C)   98 °F (36.7 °C)   TempSrc: Temporal   Temporal   SpO2: 97% 94% 96% 95%   Weight: 223 lb 3.2 oz (101.2 kg)           Physical Exam:  General - Well developed, well nourished  HEENT - Normocephalic, atraumatic. Neck -No masses   Lungs - Normal work of breathing   Abdomen - No masses   Extremities - No edema and no rashes. Neurological examination -   Her eyes are closed. She is having nonrhythmic movements of the head and right upper extremity. The movements vary in amplitude and frequency and frequently start/stop. Her movements are distractible. There is no increase in muscle tone. When her limbs are moved, the movements cease. Lab Results   Component Value Date/Time    CHOL 157 04/14/2021 04:38 AM    HDL 31 04/14/2021 04:38 AM                     Assessment and Plan    78-year-old woman with abnormal movements after knee surgery. 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 I have very low suspicion that these events are seizures of epileptic origin. Recommendations    If the patient has autonomic instability or other convincing signs of a seizure, then I recommend an EEG for further investigation. These events do not appear epileptic    Functional neurological disorders, such as psychogenic nonepileptic spells, commonly occur after procedures (in patients with this condition) and are not dangerous    Continue supportive care. Call with questions.

## 2022-09-27 NOTE — PROGRESS NOTES
OCCUPATIONAL THERAPY Initial Assessment and PM      (Link to Caseload Tracking: OT Visit Days: 1  OT Orders   Time  OT Charge Capture  Rehab Caseload Tracker  Episode     Raymundo Valente is a 60 y.o. female   PRIMARY DIAGNOSIS: Primary osteoarthritis of right knee  Primary osteoarthritis of right knee [M17.11]  Arthritis of right knee [M17.11]  Procedure(s) (LRB):  KNEE TOTAL ARTHROPLASTY ROBOTIC/ RIGHT (Right)  Day of Surgery  Reason for Referral: Pain in Right Knee (M25.561)  Stiffness of Right Knee, Not elsewhere classified (M25.661)  Generalized Muscle Weakness (M62.81)  Other lack of cordination (R27.8)  Difficulty in walking, Not elsewhere classified (R26.2)  Other abnormalities of gait and mobility (R26.89)  Outpatient in a bed: Payor: Savita Dumont / Plan: Savita Dumont / Product Type: *No Product type* /     ASSESSMENT:     REHAB RECOMMENDATIONS:   Recommendation to date pending progress:  Setting:  Home Health Therapy    Equipment:    None     ASSESSMENT:  Ms. Baldomero Calle is s/p right TKA and presents with decreased independence with functional mobility and activities of daily living as compared to baseline level of function and safety. Patient would benefit from skilled Occupational Therapy to maximize independence and safety with self-care task and functional mobility. Patient  has some post op seizure like activity,neurology consulted. Nursing advised patient can get oob. Patient sitting EOB getting ready to get on BSC. She was min assist for spt with RW to [facility]. She toileted and was SBA for hygiene. Min for brief management in standing and min to take steps back to eob. She was min sit to supine. She was feeling nauseous. She was set up in bed with family present. Nursing aware. She plans to discharge home tomorrow. She was independent prior and has all recommended DME. OT to see her in the am for full ADL session. Will follow.        Tj Daily Activity Inpatient Short Form:    AM-PAC Daily Activity Inpatient   How much help for putting on and taking off regular lower body clothing?: A Lot  How much help for Bathing?: A Lot  How much help for Toileting?: A Little  How much help for putting on and taking off regular upper body clothing?: A Lot  How much help for taking care of personal grooming?: A Little  How much help for eating meals?: A Little  AM-[facility] Inpatient Daily Activity Raw Score: 15  AM-PAC Inpatient ADL T-Scale Score : 34.69  ADL Inpatient CMS 0-100% Score: 56.46  ADL Inpatient CMS G-Code Modifier : CK     SUBJECTIVE:     Ms. Jemima Reynolds stated she wanted to get back to bed    Social/Functional   Lives With: Spouse  Type of Home: House  Home Layout: One level  Home Access: Stairs to enter with rails  Entrance Stairs - Number of Steps: 3  Entrance Stairs - Rails: Both  Bathroom Shower/Tub: Tub/Shower unit  Bathroom Equipment: Tub transfer bench, 3-in-1 commode  Home Equipment: Walker, rolling  ADL Assistance: Independent    OBJECTIVE:     Alyx Cabral / Ovi Bettencourt / Kendra Crowder: NA    RESTRICTIONS/PRECAUTIONS:   Fall     PAIN: [place] / O2:   Pre Treatment:      0/10    Post Treatment: 0/10 Vitals          Oxygen        GROSS EVALUATION: INTACT IMPAIRED   (See Comments)   UE AROM [x] []   UE PROM [] []   Strength []       Posture / Balance []  Min for transfers   Sensation []     Coordination []  Min for standing balance     Tone []       Edema []    Activity Tolerance []  Fair - due to naseia     Hand Dominance R [] L []      COGNITION/  PERCEPTION: INTACT IMPAIRED   (See Comments)   Orientation [x]     Vision [x]     Hearing [x]     Cognition  [x]  Follows directions   Perception [x]       MOBILITY: I Mod I S SBA CGA Min Mod Max Total  NT x2 Comments:   Bed Mobility    Rolling [] [] [] [] [] [] [] [] [] [] []    Supine to Sit [] [] [] [] [] [x] [] [] [] [] []    Scooting [] [] [] [] [] [x] [] [] [] [] []    Sit to Supine [] [] [] [] [] [x] [] [] [] [] []    Transfers    Sit to Stand [] [] [] [] [] [x] [] [] [] [] []    Bed to Chair [] [] [] [] [] [x] [] [] [] [] []    Stand to Sit [] [] [] [] [] [x] [] [] [] [] []    Tub/Shower [] [] [] [] [] [] [] [] [] [] []       Toilet [] [] [] [] [] [x] [] [] [] [] []        [] [] [] [] [] [] [] [] [] [] []    I=Independent, Mod I=Modified Independent, S=Supervision/Setup, SBA=Standby Assistance, CGA=Contact Guard Assistance, Min=Minimal Assistance, Mod=Moderate Assistance, Max=Maximal Assistance, Total=Total Assistance, NT=Not Tested    ACTIVITIES OF DAILY LIVING: I Mod I S SBA CGA Min Mod Max Total NT Comments   BASIC ADLs:              Upper Body Bathing [] [] [] [] [] [] [] [] [] []    Lower Body Bathing [] [] [] [] [] [] [] [] [] []    Toileting [] [] [] [x] [] [x] [] [] [] []    Upper Body Dressing [] [] [] [] [] [] [] [] [] []    Lower Body Dressing [] [] [] [] [] [] [] [] [] []    Feeding [] [] [] [] [] [] [] [] [] []    Grooming [] [] [] [] [] [] [] [] [] []    Personal Device Care [] [] [] [] [] [] [] [] [] []    Functional Mobility [] [] [] [] [] [x] [] [] [] []    I=Independent, Mod I=Modified Independent, S=Supervision/Setup, SBA=Standby Assistance, CGA=Contact Guard Assistance, Min=Minimal Assistance, Mod=Moderate Assistance, Max=Maximal Assistance, Total=Total Assistance, NT=Not Tested    PLAN:     FREQUENCY/DURATION   OT Plan of Care: 1 time/week, 2 times/week for duration of hospital stay or until stated goals are met, whichever comes first.    ACUTE OCCUPATIONAL THERAPY GOALS:   (Developed with and agreed upon by patient and/or caregiver.)    GOALS:   DISCHARGE GOALS (in preparation for going home/rehab):  3 days  1. Ms. Marlo Marc will perform lower body dressing activity with minimal assist required to demonstrate improved functional mobility and safety. 2.  Ms. Marlo Marc will perform bathing activity with minimal assist required to demonstrate improved functional mobility and safety.   3.  Ms. Marlo Marc will perform toileting activity with  contact guard assist to demonstrate improved functional mobility and safety. PROGRESSING  4. Ms. Herman Khan will perform all functional transfers transfer with contact guard assist to demonstrate improved functional mobility and safety. PROBLEM LIST:   (Skilled intervention is medically necessary to address:)  Decreased ADL/Functional Activities  Decreased Activity Tolerance  Decreased Balance  Decreased Coordination  Decreased Gait Ability  Decreased Safety Awareness  Decreased Strength  Decreased Transfer Abilities  Increased Pain   INTERVENTIONS PLANNED:   (Benefits and precautions of occupational therapy have been discussed with the patient.)  Self Care Training  Therapeutic Activity  Therapeutic Exercise/HEP  Neuromuscular Re-education  Education         TREATMENT:     EVALUATION: LOW COMPLEXITY: (Untimed Charge)    TREATMENT:     SELF CARE: (15 minutes):   Procedure(s) (per grid) utilized to improve and/or restore self-care/home management as related to toileting and functional transfers . Required moderate visual, verbal, manual, and tactile cueing to facilitate activities of daily living skills, compensatory activities, and Ot poc and discharge planning .     AFTER TREATMENT PRECAUTIONS: Bed, Bed/Chair Locked, Call light within reach, Needs within reach, RN notified, Side rails x3, and Visitors at bedside    INTERDISCIPLINARY COLLABORATION:  RN/ PCT, PT/ PTA, and OT/ KIRAN    EDUCATION:  Education Given To: Patient, Family  Education Provided: Plan of Care, Role of Therapy, Precautions, Transfer Training, Fall Prevention Strategies, Family Education, Equipment  Education Method: Demonstration, Verbal  Barriers to Learning: None  Education Outcome: Verbalized understanding, Continued education needed  [x] Safe And Effective Hygiene  [x] Fall Precautions  [] Hip Precautions  [] D/C Instruction Review [] Prosthesis Review  [x] Walker Management/Safety  [x] Adaptive Equipment as Needed  [] Therapeutic Resting Position of Joint       TOTAL TREATMENT DURATION AND TIME:  Time In: 1301  [facility]  Minutes: 2800 Carlos Rader, OT

## 2022-09-27 NOTE — PERIOP NOTE
Seizure-like activity stopped after IV versed. Dr. Jeanine Donahue recommends neuro consult during hospital stay.

## 2022-09-27 NOTE — PERIOP NOTE
Perfect serve stat consult notification to Dr. Phil Terry, IP neurologist.  Ramon Bateman. Stat inpatient neuro consult ordered. Done. Tele-psych equipment at bedside.

## 2022-09-27 NOTE — ANESTHESIA PROCEDURE NOTES
Spinal Block    Patient location during procedure: OR  End time: 9/27/2022 7:15 AM  Reason for block: primary anesthetic  Staffing  Performed: anesthesiologist   Anesthesiologist: Mik Lau MD  Spinal Block  Patient position: sitting  Prep: ChloraPrep  Patient monitoring: cardiac monitor, continuous pulse ox, frequent blood pressure checks and oxygen  Approach: midline  Location: L3/L4  Provider prep: sterile gloves and mask  Local infiltration: lidocaine  Needle  Needle type:  Luis   Needle gauge: 25 G  Assessment  CSF: clear  Attempts: 1  Hemodynamics: stable  Preanesthetic Checklist  Completed: patient identified, IV checked, site marked, risks and benefits discussed, surgical/procedural consents, equipment checked, pre-op evaluation, timeout performed, anesthesia consent given, oxygen available and monitors applied/VS acknowledged

## 2022-09-27 NOTE — PERIOP NOTE
TRANSFER - OUT REPORT:    Verbal report given to Jose Miguel Gardner RN on Marlo Scales  being transferred to [facility] for routine post-op       Report consisted of patient's Situation, Background, Assessment and   Recommendations(SBAR). Information from the following report(s) Nurse Handoff Report and Cardiac Rhythm SR  was reviewed with the receiving nurse. Lines:   Peripheral IV 09/27/22 Right;Posterior Hand (Active)   Site Assessment Clean, dry & intact 09/27/22 0922   Line Status Infusing 09/27/22 0922   Phlebitis Assessment No symptoms 09/27/22 0922   Infiltration Assessment 0 09/27/22 0922   Alcohol Cap Used No 09/27/22 0922   Dressing Status Clean, dry & intact 09/27/22 0922   Dressing Type Transparent 09/27/22 4506        Opportunity for questions and clarification was provided.       Patient transported with:  O2 @ 3lpm

## 2022-09-27 NOTE — CONSULTS
[facility] Hospitalist Consult   Admit Date:  2022  5:10 AM   Name:  Becca Orr   Age:  60 y.o. Sex:  female  :  1960   MRN:  [id number]   Room:  [facility]/    Presenting Complaint: No chief complaint on file. Reason(s) for Admission: Primary osteoarthritis of right knee [M17.11]  Arthritis of right knee [M17.11]     Hospitalists consulted by Mahesh Henderson MD for: medical management    History of Presenting Illness:   Becca Orr is a 60 y.o. female with history of HTN, GARRY non compliant with CPAP, DM, hypothyroidism who was admitted for right knee arthroplasty, hospitalist asked to consult for medical management. A&O x3, pain controlled right knee, dressing and immobilizer intact. Review of Systems:  10 systems reviewed and negative except as noted in HPI. Assessment & Plan:     Principal Problem:    Primary osteoarthritis of right knee  Plan: per ortho    Active Problems:    Snoring  Plan: non compliant with CPAP, sats trending and high 90s on 3 liters      Noncompliance with CPAP treatment  Plan: noted      Arthritis of right knee  Plan: per ortho. HTN (hypertension)  Plan: home meds resumed, controlled      DM (diabetes mellitus) ([address]  Plan: blood glucose controlled  Insulin resumed per primart      Discharge Planning:      Per ortho. Diet:  ADULT DIET; Regular  DVT PPx: ASA, SCDs  Code status: Prior    Principal Problem:    Primary osteoarthritis of right knee  Active Problems:    Snoring    Noncompliance with CPAP treatment    Arthritis of right knee    Functional neurological symptom disorder with attacks or seizures    HTN (hypertension)    DM (diabetes mellitus) ([address]  Resolved Problems:    * No resolved hospital problems.  *      Past History:    Past Medical History:   Diagnosis Date    Chronic cough     believes to be related to lisinopril, medication changed 22    Diabetes ([address]     NIDDM, average 's, symptomatic for hypoglycemia in the 127/72 97 %   09/27/22 1055 -- 66 14 134/79 97 %   09/27/22 1050 -- 62 14 (!) 143/82 97 %   09/27/22 1045 -- 76 14 (!) 152/85 96 %   09/27/22 1040 -- 63 14 (!) 143/84 99 %   09/27/22 1035 -- 62 14 (!) 171/80 99 %   09/27/22 1030 -- 71 14 (!) 171/82 99 %   09/27/22 1025 -- 82 14 (!) 148/85 98 %   09/27/22 1020 -- 66 14 (!) 143/79 100 %   09/27/22 1015 -- 81 14 139/81 97 %   09/27/22 1010 -- 69 14 (!) 154/85 98 %   09/27/22 1005 -- 78 14 137/84 97 %   09/27/22 1000 -- 74 -- -- 99 %   09/27/22 0955 -- 77 14 131/73 98 %   09/27/22 0950 -- 79 14 (!) 178/100 98 %   09/27/22 0945 -- 81 14 132/82 98 %   09/27/22 0940 -- 82 14 131/75 98 %   09/27/22 0935 -- 84 14 121/75 98 %   09/27/22 0930 -- 84 14 114/67 93 %   09/27/22 0926 98 °F (36.7 °C) 66 16 113/66 95 %   09/27/22 0925 -- 90 -- 113/66 95 %   09/27/22 0700 -- 72 16 113/68 96 %   09/27/22 0657 -- 71 18 (!) 140/84 94 %   09/27/22 0515 97.7 °F (36.5 °C) 64 18 (!) 146/80 97 %       Oxygen Therapy  SpO2: 95 %  Pulse via Oximetry: 66 beats per minute  Pulse Oximeter Device Mode: Intermittent  Pulse Oximeter Device Location: Finger  O2 Device: Nasal cannula  O2 Flow Rate (L/min): 3 L/min  Oxygen Therapy: Supplemental oxygen  O2 Delivery Method: Nasal cannula    Estimated body mass index is 38.31 kg/m² as calculated from the following:    Height as of 9/12/22: 5' 4\" (1.626 m). Weight as of this encounter: 223 lb 3.2 oz (101.2 kg). Intake/Output Summary (Last 24 hours) at 9/27/2022 1520  Last data filed at 9/27/2022 0918  Gross per 24 hour   Intake 1800 ml   Output 0 ml   Net 1800 ml         Physical Exam:  Blood pressure 121/77, pulse 57, temperature 98 °F (36.7 °C), temperature source Temporal, resp. rate 16, weight 223 lb 3.2 oz (101.2 kg), SpO2 95 %. General:    Well nourished. Head:  Normocephalic, atraumatic  Eyes:  Sclerae appear normal.  Pupils equally round. ENT:  Nares appear normal, no drainage. Moist oral mucosa  Neck:  No restricted ROM.   Trachea midline   CV:   RRR. No m/r/g. No jugular venous distension. Lungs:   CTAB. No wheezing, rhonchi, or rales. Symmetric expansion. Abdomen: Bowel sounds present. Soft, nontender, nondistended. Extremities: No cyanosis or clubbing. No edema, right knee immobilizer intact. Skin:     No rashes and normal coloration. Warm and dry. Neuro:  CN II-XII grossly intact. Sensation intact. A&Ox3  Psych:  Normal mood and affect. I have personally reviewed labs and tests showing:  Recent Labs:  Recent Results (from the past 24 hour(s))   POC Sodium and Potassium    Collection Time: 09/27/22  6:19 AM   Result Value Ref Range    POC Potassium 2.7 (LL) 3.5 - 5.1 MMOL/L   Potassium    Collection Time: 09/27/22  6:28 AM   Result Value Ref Range    Potassium 2.7 (L) 3.5 - 5.1 mmol/L   POCT Glucose    Collection Time: 09/27/22  6:33 AM   Result Value Ref Range    POC Glucose 156 (H) 65 - 100 mg/dL    Performed by: Precious    POCT Glucose    Collection Time: 09/27/22  9:51 AM   Result Value Ref Range    POC Glucose 157 (H) 65 - 100 mg/dL    Performed by: Jaden    CBC    Collection Time: 09/27/22 10:32 AM   Result Value Ref Range    WBC 5.5 4.3 - 11.1 K/uL    RBC 2.53 (L) 4.05 - 5.2 M/uL    Hemoglobin 7.0 (L) 11.7 - 15.4 g/dL    Hematocrit 22.1 (L) 35.8 - 46.3 %    MCV 87.4 79.6 - 97.8 FL    MCH 27.7 26.1 - 32.9 PG    MCHC 31.7 31.4 - 35.0 g/dL    RDW 14.9 (H) 11.9 - 14.6 %    Platelets 414 713 - 670 K/uL    MPV 10.1 9.4 - 12.3 FL    nRBC 0.00 0.0 - 0.2 K/uL       I have personally reviewed imaging studies showing:  No results found. Echocardiogram:  No results found for this or any previous visit.         Signed:  TJ Robertson - KATINA

## 2022-09-27 NOTE — PERIOP NOTE
Attempted to call report. RN unavailable at this time to take report due to recently getting an admission. Will call back in 15 minutes.

## 2022-09-27 NOTE — OP NOTE
[facility] 8 Cementless Total Knee Arthroplasty - PS  Patient:Jacqui Jimenes   : 1960  Medical Record [id number]  Pre-operative Diagnosis:  Primary osteoarthritis of right knee [M17.11]  Arthritis of right knee [M17.11]  Post-operative Diagnosis: Primary osteoarthritis of right knee [M17.11]  Arthritis of right knee [M17.11]    Surgeon: Fe Islas MD  Assistant: Axel Beltran PA-C    This surgical assistant was present for the procedure and vital for the performance of the procedure. He assisted with exposure and retraction during the procedure as well as wound closure and dressing application after the procedure. Anesthesia: Spinal    Procedure: Total Knee Arthroplasty   The complexity of the total joint surgery requires the use of a first assistant for positioning, retraction and assistance in closure. The patient's Body mass index is 38.31 kg/m²., BMI's greater then 35 make surgical exposure and retraction extremely difficult and increase operative time. 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 She was anethestized  IV antibiotics were administered per CMS protocol. Prior to the incision being made a timeout was called identifying the patient, procedure ,operative side and surgeon. The right leg was prepped and draped in the usual sterile manner  An anterior longitudinal incision was accomplished just medial to the tibial tubercle and extending approximal 6 centimeters proximal to the superior pole of the patella. A medial parapatellar capsular incision was performed. The medial capsular flap was elevated around to the insertion of the semimembranous tendon.   The patella was everted and the knee flexed and externally rotated. The medial and external menisci were excised. The lateral half of the fat pad excised and the patella femoral ligament was released. The anterior cruciate ligament was resected and the posterior cruciate ligament was excised. The Velys arrays were placed in the distal femur and proximal tibia per protocol and the knee was registered. Confirmation of registration with the [address] and surgical plan was made. The knee was balanced with tensioners as part of this process. The tibia and femur were then prepared via the Mindframe system with robotic assistance. The meniscal remnants were excised and posterior osteophytes were removed. Flexion and extension gaps were checked with blocks to confirm appropriate balance. The arrays were then removed. The femur was then finished with the notch guide for the PS box. The tibial base plate was pinned into place with the appropriate external rotation and stem site prepared. A preliminary range of motion was accomplished with the above size trial components. A polyethylene insert allowed the patient to obtain full extension as well as appropriate flexion. The patient's ligaments were stable in flexion and extension to medial and lateral stressing and the alignment was through the appropriate mechanical axis. The patella was then everted. Given grade severe grade 3-4 chondromalacia, the patella was resurfaced with a cemented all-poly patella. All trial components were removed and the implants were impacted into position with good fixation. The Irrisept lavage protocol was performed. The operative knee was injected with 60cc of Naropin, 10 cc's of morphine and 1 cc of 30mg of Toradol. The capsular layer was closed using a #1 vicryl suture and a running +1 Stratofix, while subcutaneous layers were closed using 2-0 Vicryl interrupted sutures and a #1 Stratofix. Finally the skin was closed using 3-0 Vicryl and a Zipline closure.  A sterile sterile bandage was applied. An Iceman cryo pad was applied on the operative leg. Sponge count and needle counts were correct. Stcay Left left the operating room     Implants:   Implant Name Type Inv.  Item Serial No.  Lot No. LRB No. Used Action   CEMENT BNE 20GM HALF DOSE PMMA W/ GENT M VISC RADPQ FAST - [id number]  CEMENT BNE 20GM HALF DOSE PMMA W/ GENT M VISC RADPQ FAST [id number] [facility] [employer] [employer] [id number] Right 1 Implanted   COMPONENT PAT [id number] KNEE POLY NADIA MEDIALIZED ERICK ATTUNE - [id number]  COMPONENT PAT [id number] KNEE POLY NADIA MEDIALIZED ERICK ATTUNE [id number] [facility] [employer] [id number] Right 1 Implanted   INSERT TIB SZ 6 THK5MM KNEE POST STBL ROT PLATFRM ATTUNE - [id number]  INSERT TIB SZ 6 THK5MM KNEE POST STBL ROT PLATFRM ATTUNE [id number] [facility] [employer] [facility] [id number] Right 1 Implanted   ATTUNE RP TIB BASE SZ 4 POR - [id number]  ATTUNE RP TIB BASE SZ 4 POR [id number] [facility] [employer] [id number] Right 1 Implanted   COMPONENT FEM PS 6 VIRIDIANA RT KNEE CMNTLS POROUS ATTUNE - [id number]  COMPONENT FEM PS 6 VIRIDIANA RT KNEE CMNTLS POROUS ATTUNE [id number] [employer] [employer] [id number] Right 1 Implanted     Signed By: Frankie Ya MD

## 2022-09-27 NOTE — PROGRESS NOTES
ACUTE PHYSICAL THERAPY GOALS:   (Developed with and agreed upon by patient and/or caregiver.)  GOALS (1-4 days):  (1.)Ms. Corin Gallegos will move from supine to sit and sit to supine  in bed with SUPERVISION. (2.)Ms. Corin Gallegos will transfer from bed to chair and chair to bed with SUPERVISION using the least restrictive device. (3.)Ms. Corin Gallegos will ambulate with SUPERVISION for 300 feet with the least restrictive device. (4.)Ms. Corin Gallegos will ambulate up/down 3 steps with bilateral  railing with STAND BY ASSIST.  (5.)Ms. Corin Gallegos will increase right knee ROM to 0-90°.  ________________________________________________________________________________________________            PHYSICAL THERAPY JOINT CAMP: TOTAL KNEE ARTHROPLASTY Initial Assessment and PM  (Link to Caseload Tracking: PT Visit Days : 1  Acknowledge Orders  Time In/Out  PT Charge Capture  Rehab Caseload Tracker  Episode   Marlo Scales is a 60 y.o. female   PRIMARY DIAGNOSIS: Primary osteoarthritis of right knee  Primary osteoarthritis of right knee [M17.11]  Arthritis of right knee [M17.11]  Procedure(s) (LRB):  KNEE TOTAL ARTHROPLASTY ROBOTIC/ RIGHT (Right)  Day of Surgery  Reason for Referral: Pain in Right Knee (M25.561)  Stiffness of Right Knee, Not elsewhere classified (M25.661)  Difficulty in walking, Not elsewhere classified (R26.2)  Outpatient in a bed: Payor: Frederic Angle / Plan: Frederic Angle / Product Type: *No Product type* /     REHAB RECOMMENDATIONS:   Recommendation to date pending progress:  Setting:  Home Health Therapy    Equipment:    To Be Determined     RANGE OF MOTION:   Right Knee Flexion:    Right Knee Extension:       GAIT: I Mod I S SBA CGA Min Mod Max Total  NT x2 Comments:   Level of Assistance [] [] [] [] [] [] [] [] [] [] []            Weightbearing Status       Distance    feet    Gait Quality Antalgic, Decreased step clearance, Decreased step length, and Decreased stance    DME Rolling Walker     Stairs      Ramp I=Independent, Mod I=Modified Independent, S=Supervision, SBA=Standby Assistance, CGA=Contact Guard Assistance,   Min=Minimal Assistance, Mod=Moderate Assistance, Max=Maximal Assistance, Total=Total Assistance, NT=Not Tested    ASSESSMENT:   ASSESSMENT:  Ms. Mike Echeverria presents with decreased strength and range of motion right lower extremity and with decreased independence with functional mobility s/p right total knee arthroplasty. Pt will benefit from skilled PT interventions to maximize independence with functional mobility and TKA management. Pt did well with assessment and had already been up earlier. Pt not feeling well and having nausea and says she does not feel well with anesthesia. Today's treatment focused on transfer and gait training. Pt walked into bathroom, still feeling yucky, after using bathroom she got back into bed. Pt practiced TKA exercises as below with verbal cues while reviewing HEP. Reviewed use of cryocuff as needed for pain and swelling. Pt instructed not to get up without assist. Pt plans to discharge to home from the hospital with continued therapy for follow up. Patient is hopeful to spend the night to better prepare for safe discharge home.  .     Outcome Measure:   KOOS-JR:   KOOSJr. Knee Survey Score: 19    SUBJECTIVE:   Ms. Mike Echeverria states, \"I just feel really off and kind of sick\"     Home Environment/Prior Level of Function Lives With: Spouse  Type of Home: House  Home Layout: One level  Home Access: Stairs to enter with rails  Entrance Stairs - Number of Steps: 3  Entrance Stairs - Rails: Both  Bathroom Shower/Tub: Tub/Shower unit  Bathroom Equipment: Tub transfer bench, 3-in-1 commode  Home Equipment: Walker, rolling  ADL Assistance: Independent  Mode of Transportation: Car    OBJECTIVE:     PAIN: VITAL SIGNS: LINES/DRAINS:   Pre Treatment: not reporting pain but not feeling well         Post Treatment: no reports of pain Vitals        Oxygen None    RESTRICTIONS/PRECAUTIONS:                       LOWER EXTREMITY GROSS EVALUATION:  RIGHT LE   Within Functional Limits   Abnormal   Comments   Strength [] []  Generally decreased, s/p TKA   Range of Motion [] []  Generally decreased, s/p TKA      LEFT LE Within Functional Limits   Abnormal   Comments   Strength [x] []     Range of Motion [x] []       UPPER EXTREMITY GROSS EVALUATION:     Within Functional Limits   Abnormal   Comments   Strength [x] []    Range of Motion [] []      SENSATION  Sensation [x]       COGNITION/  PERCEPTION: Intact Impaired (Comments):   Orientation [x]     Vision [x]     Hearing [x]     Cognition  []       MOBILITY: I Mod I S SBA CGA Min Mod Max Total  NT x2 Comments:   Bed Mobility    Rolling [] [] [] [] [] [] [] [] [] [] []    Supine to Sit [] [] [] [x] [] [] [] [] [] [] []    Scooting [] [] [] [x] [] [] [] [] [] [] []    Sit to Supine [] [] [] [x] [] [] [] [] [] [] []    Transfers    Sit to Stand [] [] [] [x] [] [] [] [] [] [] []    Bed to Chair [] [] [] [x] [] [] [] [] [] [] []    Stand to Sit [] [] [] [x] [] [] [] [] [] [] []    Stand Pivot [] [] [] [] [] [] [] [] [] [] []    Toilet [] [] [] [] [] [] [] [] [] [] []     [] [] [] [] [] [] [] [] [] [] []    I=Independent, Mod I=Modified Independent, S=Supervision, SBA=Standby Assistance, CGA=Contact Guard Assistance,   Min=Minimal Assistance, Mod=Moderate Assistance, Max=Maximal Assistance, Total=Total Assistance, NT=Not Tested    BALANCE: Good Fair+ Fair Fair- Poor NT Comments   Sitting Static [x] [] [] [] [] []    Sitting Dynamic [x] [] [] [] [] []              Standing Static [x] [] [] [] [] []    Standing Dynamic [] [x] [] [] [] []      PLAN:   FREQUENCY AND DURATION: BID for duration of hospital stay or until stated goals are met, whichever comes first.    THERAPY PROGNOSIS: Good    PROBLEM LIST:   (Skilled intervention is medically necessary to address:)  Decreased ADL/Functional Activities, Decreased Activity Tolerance, Decreased AROM/PROM, Decreased Gait Ability, Decreased Strength, Decreased Transfer Abilities, and Increased Pain   INTERVENTIONS PLANNED:   (Benefits and precautions of physical therapy have been discussed with the patient.)  Therapeutic Activity, Therapeutic Exercise/HEP, Gait Training, Modalities, and Education       TREATMENT:   EVALUATION: LOW COMPLEXITY: (Untimed Charge)    TREATMENT:   Therapeutic Activity (13 Minutes): Therapeutic activity included Supine to Sit, Sit to Supine, Transfer Training, Ambulation on level ground, Sitting balance , and Standing balance to improve functional Activity tolerance, Mobility, and Strength. Therapeutic Exercise (10 Minutes): Therapeutic exercises noted below to improve functional strength and mobility. TREATMENT GRID:  THERAPEUTIC  EXERCISES: DATE:  9/27 DATE:   DATE:      AM PM AM PM AM PM    [] [] [] [] [] []   Ankle Pumps  10       Quad Sets  10       Gluteal Sets  10       Hip Abd/ADduction  10       Straight Leg Raises  10       Knee Slides  10       Short Arc Quads         Chair Slides                           B = bilateral; AA = active assistive; A = active; P = passive      EDUCATION: Education Given To: Patient  Education Provided: Role of Therapy, Plan of Care  Education Outcome: Verbalized understanding, Demonstrated understanding  EDUCATION:  [x] Home Exercises  [x] Fall Precautions  [] No Pillow Under Knee  [] D/C Instruction Review [] Cryocuff  [x] Walker Management/Safety  [] Adaptive Equipment as Needed     AFTER TREATMENT PRECAUTIONS: Bed, Bed/Chair Locked, Call light within reach, Needs within reach, and Visitors at bedside    INTERDISCIPLINARY COLLABORATION:  RN/ PCT    COMPLIANCE WITH PROGRAM/EXERCISE: compliant all of the time, Will assess as treatment progresses. RECOMMENDATIONS/INTENT FOR NEXT TREATMENT SESSION: Treatment next visit will focus on increasing Ms. Gale's independence with bed mobility, transfers, gait training, strength/ROM exercises, modalities for pain, and patient education.      TIME IN/OUT:  Time In: 1500  Time Out: 0  Minutes: 21 [facility],

## 2022-09-28 VITALS
SYSTOLIC BLOOD PRESSURE: 110 MMHG | DIASTOLIC BLOOD PRESSURE: 66 MMHG | OXYGEN SATURATION: 95 % | TEMPERATURE: 98.2 F | RESPIRATION RATE: 18 BRPM | HEART RATE: 65 BPM | WEIGHT: 223.2 LBS | BODY MASS INDEX: 38.31 KG/M2

## 2022-09-28 LAB
ANION GAP SERPL CALC-SCNC: 4 MMOL/L (ref 4–13)
BUN SERPL-MCNC: 11 MG/DL (ref 8–23)
CALCIUM SERPL-MCNC: 8.3 MG/DL (ref 8.3–10.4)
CHLORIDE SERPL-SCNC: 109 MMOL/L (ref 101–110)
CO2 SERPL-SCNC: 29 MMOL/L (ref 21–32)
CREAT SERPL-MCNC: 0.94 MG/DL (ref 0.6–1)
GLUCOSE BLD STRIP.AUTO-MCNC: 97 MG/DL (ref 65–100)
GLUCOSE SERPL-MCNC: 91 MG/DL (ref 65–100)
POTASSIUM SERPL-SCNC: 4.1 MMOL/L (ref 3.5–5.1)
SERVICE CMNT-IMP: NORMAL
SODIUM SERPL-SCNC: 142 MMOL/L (ref 136–145)

## 2022-09-28 PROCEDURE — 94761 N-INVAS EAR/PLS OXIMETRY MLT: CPT

## 2022-09-28 PROCEDURE — 80048 BASIC METABOLIC PNL TOTAL CA: CPT

## 2022-09-28 PROCEDURE — 6360000002 HC RX W HCPCS: Performed by: FAMILY MEDICINE

## 2022-09-28 PROCEDURE — 6370000000 HC RX 637 (ALT 250 FOR IP): Performed by: PHYSICIAN ASSISTANT

## 2022-09-28 PROCEDURE — 6370000000 HC RX 637 (ALT 250 FOR IP): Performed by: ORTHOPAEDIC SURGERY

## 2022-09-28 PROCEDURE — 97535 SELF CARE MNGMENT TRAINING: CPT

## 2022-09-28 PROCEDURE — 36415 COLL VENOUS BLD VENIPUNCTURE: CPT

## 2022-09-28 PROCEDURE — 82962 GLUCOSE BLOOD TEST: CPT

## 2022-09-28 PROCEDURE — 97530 THERAPEUTIC ACTIVITIES: CPT

## 2022-09-28 RX ORDER — PROMETHAZINE HYDROCHLORIDE 25 MG/1
25 TABLET ORAL EVERY 6 HOURS PRN
Qty: 40 TABLET | Refills: 1 | Status: SHIPPED | OUTPATIENT
Start: 2022-09-28

## 2022-09-28 RX ORDER — CELECOXIB 200 MG/1
200 CAPSULE ORAL DAILY
Qty: 30 CAPSULE | Refills: 0 | Status: SHIPPED | OUTPATIENT
Start: 2022-09-28 | End: 2022-10-28

## 2022-09-28 RX ORDER — HYDROMORPHONE HYDROCHLORIDE 2 MG/1
2-4 TABLET ORAL EVERY 4 HOURS PRN
Qty: 60 TABLET | Refills: 0 | Status: SHIPPED | OUTPATIENT
Start: 2022-09-28 | End: 2022-10-05

## 2022-09-28 RX ORDER — ASPIRIN 81 MG/1
81 TABLET ORAL 2 TIMES DAILY
Qty: 60 TABLET | Refills: 0 | Status: SHIPPED | OUTPATIENT
Start: 2022-09-28 | End: 2022-10-28

## 2022-09-28 RX ADMIN — POTASSIUM CHLORIDE 10 MEQ: 7.46 INJECTION, SOLUTION INTRAVENOUS at 05:31

## 2022-09-28 RX ADMIN — ACETAMINOPHEN 650 MG: 325 TABLET, FILM COATED ORAL at 12:26

## 2022-09-28 RX ADMIN — ASPIRIN 81 MG: 81 TABLET, COATED ORAL at 08:38

## 2022-09-28 RX ADMIN — LEVOTHYROXINE SODIUM 150 MCG: 0.07 TABLET ORAL at 05:32

## 2022-09-28 RX ADMIN — INSULIN LISPRO 16 UNITS: 100 INJECTION, SOLUTION INTRAVENOUS; SUBCUTANEOUS at 08:12

## 2022-09-28 RX ADMIN — PANTOPRAZOLE SODIUM 40 MG: 40 TABLET, DELAYED RELEASE ORAL at 05:32

## 2022-09-28 RX ADMIN — SENNOSIDES AND DOCUSATE SODIUM 1 TABLET: 50; 8.6 TABLET ORAL at 08:41

## 2022-09-28 RX ADMIN — POTASSIUM CHLORIDE 10 MEQ: 7.46 INJECTION, SOLUTION INTRAVENOUS at 02:41

## 2022-09-28 RX ADMIN — ACETAMINOPHEN 650 MG: 325 TABLET, FILM COATED ORAL at 03:14

## 2022-09-28 RX ADMIN — CELECOXIB 200 MG: 200 CAPSULE ORAL at 08:41

## 2022-09-28 RX ADMIN — CHLORTHALIDONE 25 MG: 25 TABLET ORAL at 08:41

## 2022-09-28 RX ADMIN — CETIRIZINE HYDROCHLORIDE 10 MG: 10 TABLET ORAL at 08:38

## 2022-09-28 RX ADMIN — METOPROLOL TARTRATE 25 MG: 25 TABLET, FILM COATED ORAL at 08:41

## 2022-09-28 RX ADMIN — POTASSIUM CHLORIDE 10 MEQ: 7.46 INJECTION, SOLUTION INTRAVENOUS at 06:34

## 2022-09-28 RX ADMIN — POTASSIUM CHLORIDE 10 MEQ: 7.46 INJECTION, SOLUTION INTRAVENOUS at 00:39

## 2022-09-28 RX ADMIN — HYDROMORPHONE HYDROCHLORIDE 2 MG: 2 TABLET ORAL at 08:41

## 2022-09-28 RX ADMIN — HYDROMORPHONE HYDROCHLORIDE 2 MG: 2 TABLET ORAL at 12:26

## 2022-09-28 RX ADMIN — HYDROMORPHONE HYDROCHLORIDE 2 MG: 2 TABLET ORAL at 03:09

## 2022-09-28 ASSESSMENT — PAIN SCALES - GENERAL
PAINLEVEL_OUTOF10: 4
PAINLEVEL_OUTOF10: 2
PAINLEVEL_OUTOF10: 7
PAINLEVEL_OUTOF10: 2
PAINLEVEL_OUTOF10: 7

## 2022-09-28 ASSESSMENT — PAIN DESCRIPTION - DESCRIPTORS: DESCRIPTORS: ACHING

## 2022-09-28 ASSESSMENT — PAIN DESCRIPTION - ORIENTATION: ORIENTATION: RIGHT

## 2022-09-28 ASSESSMENT — PAIN DESCRIPTION - LOCATION
LOCATION: INCISION;KNEE
LOCATION: KNEE;INCISION

## 2022-09-28 NOTE — PROGRESS NOTES
Hospitalist Progress Note   Admit Date:  2022  5:10 AM   Name:  Anjelica Westfall   Age:  60 y.o. Sex:  female  :  1960   MRN:  [id number]   Room:  [facility]    Presenting Complaint: No chief complaint on file. Reason(s) for Admission: Primary osteoarthritis of right knee [M17.11]  Arthritis of right knee [M17.11]     Hospital Course:   Anjelica Westfall is a 60 y.o. female with history of HTN, GARRY non compliant with CPAP, DM, hypothyroidism who was admitted for right knee arthroplasty, hospitalist asked to consult for medical management. Subjective & 24hr Events (22): No episodes of seizure like activity reported overnight. Assessment & Plan:     Principal Problem:    Primary osteoarthritis of right knee  Plan: s/p Rt TKA, pain control as per ortho  Active Problems:    Noncompliance with CPAP treatment  Plan: aware    Functional neurological symptom disorder with attacks or seizures  Plan: Neuro has evaluated and states there is no need for treatement    HTN (hypertension)  Plan: therapeutically controlled    DM (diabetes mellitus) ([address]  Plan: fsbs wnl    Hypokalemia  Plan: resolved      Anticipated discharge needs: As per PT/OT    Diet:  No diet orders on file  DVT PPx: scds  Code status: Prior    Hospital Problems:  Principal Problem:    Primary osteoarthritis of right knee  Active Problems:    Snoring    Noncompliance with CPAP treatment    Arthritis of right knee    Functional neurological symptom disorder with attacks or seizures    HTN (hypertension)    DM (diabetes mellitus) ([address]    Hypokalemia  Resolved Problems:    * No resolved hospital problems.  *      Objective:   Patient Vitals for the past 24 hrs:   Temp Pulse Resp BP SpO2   22 0732 98.2 °F (36.8 °C) 65 18 110/66 95 %   22 0712 -- (!) 49 16 -- 95 %   22 0247 97.6 °F (36.4 °C) (!) 49 16 (!) 144/81 94 %   22 0245 -- -- -- -- 95 %   22 2259 97.8 °F (36.6 °C) 60 16 112/67 93 % 09/27/22 1947 -- 54 16 -- 93 %   09/27/22 1922 97.5 °F (36.4 °C) 57 16 121/71 98 %       Oxygen Therapy  SpO2: 95 %  Pulse via Oximetry: 59 beats per minute  Pulse Oximeter Device Mode: Continuous  Pulse Oximeter Device Location: Finger  O2 Device: None (Room air)  O2 Flow Rate (L/min): 0 L/min  Oxygen Therapy: Supplemental oxygen  O2 Delivery Method: Nasal cannula    Estimated body mass index is 38.31 kg/m² as calculated from the following:    Height as of 9/12/22: 5' 4\" (1.626 m). Weight as of this encounter: 223 lb 3.2 oz (101.2 kg). Intake/Output Summary (Last 24 hours) at 9/28/2022 1640  Last data filed at 9/27/2022 1930  Gross per 24 hour   Intake 240 ml   Output 300 ml   Net -60 ml         Physical Exam:     Blood pressure 110/66, pulse 65, temperature 98.2 °F (36.8 °C), temperature source Oral, resp. rate 18, weight 223 lb 3.2 oz (101.2 kg), SpO2 95 %. General:    Well nourished. Obese  Head:  Normocephalic, atraumatic  Eyes:  Sclerae appear normal.  Pupils equally round. ENT:  Nares appear normal, no drainage. Moist oral mucosa  Neck:  No restricted ROM. Trachea midline   CV:   RRR. No m/r/g. No jugular venous distension. Lungs:   CTAB. No wheezing, rhonchi, or rales. Symmetric expansion. Abdomen: Bowel sounds present. Soft, nontender, nondistended. Extremities: Right leg in binder  Skin:     No rashes and normal coloration. Warm and dry. Neuro:  CN II-XII grossly intact. Sensation intact. A&Ox3  Psych:  Normal mood and affect.       I have personally reviewed labs and tests showing:  Recent Labs:  Recent Results (from the past 48 hour(s))   POC Sodium and Potassium    Collection Time: 09/27/22  6:19 AM   Result Value Ref Range    POC Potassium 2.7 (LL) 3.5 - 5.1 MMOL/L   Potassium    Collection Time: 09/27/22  6:28 AM   Result Value Ref Range    Potassium 2.7 (L) 3.5 - 5.1 mmol/L   POCT Glucose    Collection Time: 09/27/22  6:33 AM   Result Value Ref Range    POC Glucose 156 (H) 65 - 100 mg/dL    Performed by: Precious    POCT Glucose    Collection Time: 09/27/22  9:51 AM   Result Value Ref Range    POC Glucose 157 (H) 65 - 100 mg/dL    Performed by: Jaden    CBC    Collection Time: 09/27/22 10:32 AM   Result Value Ref Range    WBC 5.5 4.3 - 11.1 K/uL    RBC 2.53 (L) 4.05 - 5.2 M/uL    Hemoglobin 7.0 (L) 11.7 - 15.4 g/dL    Hematocrit 22.1 (L) 35.8 - 46.3 %    MCV 87.4 79.6 - 97.8 FL    MCH 27.7 26.1 - 32.9 PG    MCHC 31.7 31.4 - 35.0 g/dL    RDW 14.9 (H) 11.9 - 14.6 %    Platelets 658 203 - 865 K/uL    MPV 10.1 9.4 - 12.3 FL    nRBC 0.00 0.0 - 0.2 K/uL   Hemoglobin and Hematocrit    Collection Time: 09/27/22  8:10 PM   Result Value Ref Range    Hemoglobin 11.1 (L) 11.7 - 15.4 g/dL    Hematocrit 34.1 (L) 35.8 - 46.3 %   POCT Glucose    Collection Time: 09/27/22  8:52 PM   Result Value Ref Range    POC Glucose 222 (H) 65 - 100 mg/dL    Performed by: Ras    Basic Metabolic Panel w/ Reflex to MG    Collection Time: 09/28/22  4:05 AM   Result Value Ref Range    Sodium 142 136 - 145 mmol/L    Potassium 4.1 3.5 - 5.1 mmol/L    Chloride 109 101 - 110 mmol/L    CO2 29 21 - 32 mmol/L    Anion Gap 4 4 - 13 mmol/L    Glucose 91 65 - 100 mg/dL    BUN 11 8 - 23 MG/DL    Creatinine 0.94 0.6 - 1.0 MG/DL    GFR African American >60 >60 ml/min/1.73m2    GFR Non- >60 >60 ml/min/1.73m2    Calcium 8.3 8.3 - 10.4 MG/DL   POCT Glucose    Collection Time: 09/28/22  6:19 AM   Result Value Ref Range    POC Glucose 97 65 - 100 mg/dL    Performed by: Nick Benz        I have personally reviewed imaging studies showing: Other Studies:  No orders to display       Current Meds:  No current facility-administered medications for this encounter.      Current Outpatient Medications   Medication Sig    aspirin 81 MG EC tablet Take 1 tablet by mouth 2 times daily    celecoxib (CELEBREX) 200 MG capsule Take 1 capsule by mouth daily    HYDROmorphone (DILAUDID) 2 MG tablet Take 1-2 tablets by mouth every 4 hours as needed for Pain for up to 7 days. promethazine (PHENERGAN) 25 MG tablet Take 1 tablet by mouth every 6 hours as needed for Nausea    chlorthalidone (HYGROTON) 25 MG tablet Take 25 mg by mouth daily    losartan (COZAAR) 50 MG tablet Take 50 mg by mouth daily    levothyroxine (SYNTHROID) 150 MCG tablet Take 150 mcg by mouth Daily    omeprazole (PRILOSEC) 20 MG delayed release capsule Take 20 mg by mouth daily    cetirizine (ZYRTEC) 10 MG tablet Take 10 mg by mouth daily    albuterol sulfate HFA (PROVENTIL;VENTOLIN;PROAIR) 108 (90 Base) MCG/ACT inhaler ProAir HFA 90 mcg/actuation aerosol inhaler   Inhale 2 puff(s) EVERY 4 HOURS as needed by inhalation route. amLODIPine (NORVASC) 10 MG tablet amlodipine 10 mg tablet   Take 1 tablet every day by oral route. fluticasone (FLONASE) 50 MCG/ACT nasal spray Flonase Allergy Relief 50 mcg/actuation nasal spray,suspension   Spray 1 spray twice a day by intranasal route. metoprolol tartrate (LOPRESSOR) 25 MG tablet Take 50 mg by mouth 2 times daily    vitamin D (CHOLECALCIFEROL) 25 MCG (1000 UT) TABS tablet Take 1,000 Units by mouth daily    insulin aspart (NOVOLOG) 100 UNIT/ML injection pen Inject 16 Units into the skin 3 times daily (before meals)    insulin glargine (LANTUS;BASAGLAR) 100 UNIT/ML injection pen Inject 55 Units into the skin nightly       Signed:  Dalia Cintron MD    Part of this note may have been written by using a voice dictation software. The note has been proof read but may still contain some grammatical/other typographical errors.

## 2022-09-28 NOTE — PROGRESS NOTES
ACUTE PHYSICAL THERAPY GOALS:   (Developed with and agreed upon by patient and/or caregiver.)  GOALS (1-4 days):  (1.)Ms. Josh Moe will move from supine to sit and sit to supine  in bed with SUPERVISION. (2.)Ms. Josh Moe will transfer from bed to chair and chair to bed with SUPERVISION using the least restrictive device. (3.)Ms. Josh Moe will ambulate with SUPERVISION for 300 feet with the least restrictive device. (4.)Ms. Josh Moe will ambulate up/down 3 steps with bilateral  railing with STAND BY ASSIST.  (5.)Ms. Josh Moe will increase right knee ROM to 0-90°.  ________________________________________________________________________________________________            PHYSICAL THERAPY JOINT CAMP: TOTAL KNEE ARTHROPLASTY Daily Note and AM  (Link to Caseload Tracking: PT Visit Days : 1  Acknowledge Orders  Time In/Out  PT Charge Capture  Rehab Caseload Tracker  Episode   Dung Lyle is a 60 y.o. female   PRIMARY DIAGNOSIS: Primary osteoarthritis of right knee  Primary osteoarthritis of right knee [M17.11]  Arthritis of right knee [M17.11]  Procedure(s) (LRB):  KNEE TOTAL ARTHROPLASTY ROBOTIC/ RIGHT (Right)  1 Day Post-Op  Reason for Referral: Pain in Right Knee (M25.561)  Stiffness of Right Knee, Not elsewhere classified (M25.661)  Difficulty in walking, Not elsewhere classified (R26.2)  Outpatient in a bed: Payor: Lyric Knight / Plan: Lyric Knight / Product Type: *No Product type* /     REHAB RECOMMENDATIONS:   Recommendation to date pending progress:  Setting:  Home Health Therapy    Equipment:    To Be Determined     RANGE OF MOTION:   Right Knee Flexion: R Knee Flexion 0-145: 77  Right Knee Extension: R Knee Extension 0: 5     GAIT: I Mod I S SBA CGA Min Mod Max Total  NT x2 Comments:   Level of Assistance [] [] [] [x] [] [] [] [] [] [] []            Weightbearing Status  Right Lower Extremity Weight Bearing: Weight Bearing As Tolerated    Distance  250 feet    Gait Quality Antalgic, Decreased step clearance, Decreased step length, and Decreased stance    DME Rolling Walker     Stairs Stairs/Curb  Stairs?: Yes  Stairs  # Steps : 3 (x 2)  Rails: Right ascending (also did one rail with 2 hands CGA)  Device: Hand Held Assist  Assistance: Minimal assistance    Ramp     I=Independent, Mod I=Modified Independent, S=Supervision, SBA=Standby Assistance, CGA=Contact Guard Assistance,   Min=Minimal Assistance, Mod=Moderate Assistance, Max=Maximal Assistance, Total=Total Assistance, NT=Not Tested    ASSESSMENT:   ASSESSMENT:  Ms. Corin Gallegos presents with decreased strength and range of motion right lower extremity and with decreased independence with functional mobility s/p right total knee arthroplasty. Pt will benefit from skilled PT interventions to maximize independence with functional mobility and TKA management. Pt did well with assessment and had already been up earlier. Pt not feeling well and having nausea and says she does not feel well with anesthesia. Today's treatment focused on transfer and gait training. Pt walked into bathroom, still feeling yucky, after using bathroom she got back into bed. Pt practiced TKA exercises as below with verbal cues while reviewing HEP. Reviewed use of cryocuff as needed for pain and swelling. Pt instructed not to get up without assist. Pt plans to discharge to home from the hospital with continued therapy for follow up. Patient is hopeful to spend the night to better prepare for safe discharge home. 9/28/22 - pt. Supine in bed and doing well and eager to go home and family present. Reviewed safety and importance of rom and swelling control and knee extension positioning and that she will have more pain and swelling at home. She ambulated well with RW and did some steps without difficulty. She did tka exercises with cues and assistance. They had no other questions or concerns about going home.        Outcome Measure:   KOOS-JR:   KOOS, Jr. Knee Survey Score: 23    SUBJECTIVE:   Ms. Nely West states, \"good\"     Home Environment/Prior Level of Function Lives With: Spouse  Type of Home: House  Home Layout: One level  Home Access: Stairs to enter with rails  Entrance Stairs - Number of Steps: 3  Entrance Stairs - Rails: Both  Bathroom Shower/Tub: Tub/Shower unit  Bathroom Equipment: Tub transfer bench, 3-in-1 commode  Home Equipment: Walker, rolling  ADL Assistance: Independent  Mode of Transportation: Car    OBJECTIVE:     PAIN: VITAL SIGNS: LINES/DRAINS:   Pre Treatment:          Post Treatment: mild knee pain, did not rate Vitals        Oxygen    None    RESTRICTIONS/PRECAUTIONS:  Restrictions/Precautions: Weight Bearing  Right Lower Extremity Weight Bearing: Weight Bearing As Tolerated        Restrictions/Precautions: Weight Bearing        LOWER EXTREMITY GROSS EVALUATION:  RIGHT LE   Within Functional Limits   Abnormal   Comments   Strength [] []  Generally decreased, s/p TKA   Range of Motion [] [] AROM RLE (degrees)  RLE AROM: Exceptions  R Knee Flexion 0-145: 77  R Knee Extension 0: 5      LEFT LE Within Functional Limits   Abnormal   Comments   Strength [x] []     Range of Motion [x] []       UPPER EXTREMITY GROSS EVALUATION:     Within Functional Limits   Abnormal   Comments   Strength [x] []    Range of Motion [] []      SENSATION  Sensation [x]       COGNITION/  PERCEPTION: Intact Impaired (Comments):   Orientation [x]     Vision [x]     Hearing [x]     Cognition  []       MOBILITY: I Mod I S SBA CGA Min Mod Max Total  NT x2 Comments:   Bed Mobility    Rolling [] [] [] [] [] [] [] [] [] [] []    Supine to Sit [] [] [] [x] [] [] [] [] [] [] []    Scooting [] [] [] [x] [] [] [] [] [] [] []    Sit to Supine [] [] [] [x] [] [] [] [] [] [] []    Transfers    Sit to Stand [] [] [] [x] [] [] [] [] [] [] []    Bed to Chair [] [] [] [x] [] [] [] [] [] [] []    Stand to Sit [] [] [] [x] [] [] [] [] [] [] []    Stand Pivot [] [] [] [] [] [] [] [] [] [] []    Toilet [] [] [] [] [] [] [] [] [] [] []     [] [] [] [] [] [] [] [] [] [] []    I=Independent, Mod I=Modified Independent, S=Supervision, SBA=Standby Assistance, CGA=Contact Guard Assistance,   Min=Minimal Assistance, Mod=Moderate Assistance, Max=Maximal Assistance, Total=Total Assistance, NT=Not Tested    BALANCE: Good Fair+ Fair Fair- Poor NT Comments   Sitting Static [x] [] [] [] [] []    Sitting Dynamic [x] [] [] [] [] []              Standing Static [x] [] [] [] [] []    Standing Dynamic [] [x] [] [] [] []      PLAN:   FREQUENCY AND DURATION: BID for duration of hospital stay or until stated goals are met, whichever comes first.    THERAPY PROGNOSIS: Good    PROBLEM LIST:   (Skilled intervention is medically necessary to address:)  Decreased ADL/Functional Activities, Decreased Activity Tolerance, Decreased AROM/PROM, Decreased Gait Ability, Decreased Strength, Decreased Transfer Abilities, and Increased Pain   INTERVENTIONS PLANNED:   (Benefits and precautions of physical therapy have been discussed with the patient.)  Therapeutic Activity, Therapeutic Exercise/HEP, Gait Training, Modalities, and Education       TREATMENT:       TREATMENT:   Therapeutic Activity (30 Minutes): Therapeutic activity included Supine to Sit, Sit to Supine, Scooting, Transfer Training, Ambulation on level ground, Stair Training, Sitting balance , Standing balance, and exercises to improve functional Activity tolerance, Balance, Coordination, Mobility, Strength, and ROM.     TREATMENT GRID:  THERAPEUTIC  EXERCISES: DATE:  9/27 DATE:  9/28 DATE:      AM PM AM PM AM PM    [] [] [] [] [] []   Ankle Pumps  10 15      Quad Sets  10 15      Gluteal Sets  10 15      Hip Abd/ADduction  10 15      Straight Leg Raises  10 15aa      Knee Slides  10 15aa      Short Arc Quads   15      Chair Slides   15                        B = bilateral; AA = active assistive; A = active; P = passive      EDUCATION:    EDUCATION:  [x] Home Exercises  [x] Fall Precautions  [x] No Pillow Under Knee  [x] D/C Instruction Review [x] Cryocuff  [x] Walker Management/Safety  [] Adaptive Equipment as Needed     AFTER TREATMENT PRECAUTIONS: Bed, Bed/Chair Locked, Call light within reach, Needs within reach, and Visitors at bedside    INTERDISCIPLINARY COLLABORATION:  RN/ PCT    COMPLIANCE WITH PROGRAM/EXERCISE: compliant all of the time    RECOMMENDATIONS/INTENT FOR NEXT TREATMENT SESSION: Treatment next visit will focus on increasing Ms. Gale's independence with bed mobility, transfers, gait training, strength/ROM exercises, modalities for pain, and patient education.      TIME IN/OUT:  Time In: 1100  Time Out: 1130  Minutes: [address], PT

## 2022-09-28 NOTE — DISCHARGE SUMMARY
PRN  HYDROmorphone, 2 mg, Q4H PRN  HYDROmorphone, 4 mg, Q4H PRN  potassium chloride, 40 mEq, PRN   Or  potassium alternative oral replacement, 40 mEq, PRN   Or  potassium chloride, 10 mEq, PRN        Discharge Medications given:  Current Discharge Medication List        START taking these medications    Details   aspirin 81 MG EC tablet Take 1 tablet by mouth 2 times daily  Qty: 60 tablet, Refills: 0      celecoxib (CELEBREX) 200 MG capsule Take 1 capsule by mouth daily  Qty: 30 capsule, Refills: 0      HYDROmorphone (DILAUDID) 2 MG tablet Take 1-2 tablets by mouth every 4 hours as needed for Pain for up to 7 days. Qty: 60 tablet, Refills: 0    Comments: Reduce doses taken as pain becomes manageable  Associated Diagnoses: Total knee replacement status, right      promethazine (PHENERGAN) 25 MG tablet Take 1 tablet by mouth every 6 hours as needed for Nausea  Qty: 40 tablet, Refills: 1           CONTINUE these medications which have NOT CHANGED    Details   chlorthalidone (HYGROTON) 25 MG tablet Take 25 mg by mouth daily      losartan (COZAAR) 50 MG tablet Take 50 mg by mouth daily      levothyroxine (SYNTHROID) 150 MCG tablet Take 150 mcg by mouth Daily      omeprazole (PRILOSEC) 20 MG delayed release capsule Take 20 mg by mouth daily      cetirizine (ZYRTEC) 10 MG tablet Take 10 mg by mouth daily      albuterol sulfate HFA (PROVENTIL;VENTOLIN;PROAIR) 108 (90 Base) MCG/ACT inhaler ProAir HFA 90 mcg/actuation aerosol inhaler   Inhale 2 puff(s) EVERY 4 HOURS as needed by inhalation route. amLODIPine (NORVASC) 10 MG tablet amlodipine 10 mg tablet   Take 1 tablet every day by oral route. fluticasone (FLONASE) 50 MCG/ACT nasal spray Flonase Allergy Relief 50 mcg/actuation nasal spray,suspension   Spray 1 spray twice a day by intranasal route.       metoprolol tartrate (LOPRESSOR) 25 MG tablet Take 50 mg by mouth 2 times daily      vitamin D (CHOLECALCIFEROL) 25 MCG (1000 UT) TABS tablet Take 1,000 Units by mouth daily      insulin aspart (NOVOLOG) 100 UNIT/ML injection pen Inject 16 Units into the skin 3 times daily (before meals)      insulin glargine (LANTUS;BASAGLAR) 100 UNIT/ML injection pen Inject 55 Units into the skin nightly              Additional DVT Prophylaxis:  SAMUEL Hose,Plexi-Pulse    Postoperative transfusions:   none  Post Op complications: none    Hemoglobin at discharge:   Lab Results   Component Value Date/Time    HGB 11.1 09/27/2022 08:10 PM       Wound appears to be healing without any evidence of infection. Physical Therapy started on the day following surgery and progressed to independent ambulation with the aid of a walker. At the time of discharge, able to go up and down stairs and had understanding of precautions needed following surgery. PT/OT:         Ambulation Response:  Tolerated well                   Discharged to: home    Discharge instructions:  -Rx pain medication given  - Anticoagulate with: Ecotrin 81 mg PO BID x 4 weeks  -Resume pre hospital diet             -Resume home medications per medical continuation form     -Ambulate with walker, appropriate total joint protocol  -Follow up in office as scheduled       Signed:  LU Holder  9/28/2022  8:24 AM

## 2022-09-28 NOTE — PROGRESS NOTES
OCCUPATIONAL THERAPY Daily Note, Discharge, and AM      (Link to Caseload Tracking: OT Visit Days: 2  OT Orders   Time  OT Charge Capture  Rehab Caseload Tracker  Episode     Morgan Peabody is a 60 y.o. female   PRIMARY DIAGNOSIS: Primary osteoarthritis of right knee  Primary osteoarthritis of right knee [M17.11]  Arthritis of right knee [M17.11]  Procedure(s) (LRB):  KNEE TOTAL ARTHROPLASTY ROBOTIC/ RIGHT (Right)  1 Day Post-Op  Reason for Referral: Pain in Right Knee (M25.561)  Stiffness of Right Knee, Not elsewhere classified (M25.661)  Generalized Muscle Weakness (M62.81)  Other lack of cordination (R27.8)  Difficulty in walking, Not elsewhere classified (R26.2)  Other abnormalities of gait and mobility (R26.89)  Outpatient in a bed: Payor: Ozzy Fried / Plan: Ozzy Fried / Product Type: *No Product type* /     ASSESSMENT:     REHAB RECOMMENDATIONS:   Recommendation to date pending progress:  Setting:  Home Health Therapy    Equipment:    None     ASSESSMENT:  Ms. Herman Khan is s/p right TKA. Patient completed shower and dressing as charted below in ADL grid and is ambulating with rolling walker and stand by assist.  Patient has met 4/4 goals and plans to return home with good family support. Family able to provide patient with appropriate level of assistance at this time. OT reviewed safety precautions throughout session and therapy schedule for the remainder of today. Patient instructed to call for assistance when needing to get up from recliner and all needs in reach. Patient verbalized understanding of call light.         [address] [facility] [address] [address] AM-PAC 6 Clicks Daily Activity Inpatient Short Form:    AM-PAC Daily Activity Inpatient   How much help for putting on and taking off regular lower body clothing?: A Lot  How much help for Bathing?: A Lot  How much help for Toileting?: A Little  How much help for putting on and taking off regular upper body clothing?: A Lot  How much help for taking care of personal grooming?: A Little  How much help for eating meals?: A Little  AM-PAC Inpatient Daily Activity Raw Score: 15  AM-PAC Inpatient ADL T-Scale Score : 34.69  ADL Inpatient CMS 0-100% Score: 56.46  ADL Inpatient CMS G-Code Modifier : CK     SUBJECTIVE:     Ms. Herman Khan stated she plans to go home today    Social/Functional   Lives With: Spouse  Type of Home: House  Home Layout: One level  Home Access: Stairs to enter with rails  Entrance Stairs - Number of Steps: 3  Entrance Stairs - Rails: Both  Bathroom Shower/Tub: Tub/Shower unit  Bathroom Equipment: Tub transfer bench, 3-in-1 commode  Home Equipment: Walker, rolling  ADL Assistance: Independent    OBJECTIVE:     Cheo Willams / Talisha Winn / Nick Merino: NA    RESTRICTIONS/PRECAUTIONS:   Fall     PAIN: Clare Patel / O2:   Pre Treatment:      0/10    Post Treatment: 0/10 Vitals          Oxygen        GROSS EVALUATION: INTACT IMPAIRED   (See Comments)   UE AROM [x] []   UE PROM [] []   Strength []       Posture / Balance []  Min for transfers   Sensation []     Coordination []  Min for standing balance     Tone []       Edema []    Activity Tolerance []  Fair - due to naseia     Hand Dominance R [] L []      COGNITION/  PERCEPTION: INTACT IMPAIRED   (See Comments)   Orientation [x]     Vision [x]     Hearing [x]     Cognition  [x]  Follows directions   Perception [x]       MOBILITY: I Mod I S SBA CGA Min Mod Max Total  NT x2 Comments:   Bed Mobility    Rolling [] [] [] [] [] [] [] [] [] [] []    Supine to Sit [] [] [] [] [] [] [] [] [] [] []    Scooting [] [] [] [] [] [] [] [] [] [] []    Sit to Supine [] [] [] [] [] [] [] [] [] [] []    Transfers    Sit to Stand [] [] [] [x] [] [] [] [] [] [] []    Bed to Chair [] [] [] [x] [] [] [] [] [] [] []    Stand to Sit [] [] [] [x] [] [] [] [] [] [] []    Tub/Shower [] [] [] [x] [] [] [] [] [] [] []       Toilet [] [] [] [x] [] [] [] [] [] [] []        [] [] [] [] [] [] [] [] [] [] []    I=Independent, Mod I=Modified Independent, S=Supervision/Setup, SBA=Standby Assistance, CGA=Contact Guard Assistance, Min=Minimal Assistance, Mod=Moderate Assistance, Max=Maximal Assistance, Total=Total Assistance, NT=Not Tested    ACTIVITIES OF DAILY LIVING: I Mod I S SBA CGA Min Mod Max Total NT Comments   BASIC ADLs:              Upper Body Bathing [] [] [x] [] [] [] [] [] [] []    Lower Body Bathing [] [] [x] [] [] [] [] [] [] []    Toileting [] [] [x] [] [] [] [] [] [] []    Upper Body Dressing [] [] [x] [] [] [] [] [] [] []    Lower Body Dressing [] [] [x] [] [] [] [] [] [] []    Feeding [] [] [x] [] [] [] [] [] [] []    Grooming [] [] [x] [] [] [] [] [] [] []    Personal Device Care [] [] [] [] [] [] [] [] [] []    Functional Mobility [] [] [] [x] [] [] [] [] [] []    I=Independent, Mod I=Modified Independent, S=Supervision/Setup, SBA=Standby Assistance, CGA=Contact Guard Assistance, Min=Minimal Assistance, Mod=Moderate Assistance, Max=Maximal Assistance, Total=Total Assistance, NT=Not Tested    PLAN:     FREQUENCY/DURATION     for duration of hospital stay or until stated goals are met, whichever comes first.    ACUTE OCCUPATIONAL THERAPY GOALS:   (Developed with and agreed upon by patient and/or caregiver.)    GOALS:   DISCHARGE GOALS (in preparation for going home/rehab):  3 days all goals met 9/28/2022   1. Ms. Yoni Chandra will perform lower body dressing activity with minimal assist required to demonstrate improved functional mobility and safety. 2.  Ms. Yoni Chandra will perform bathing activity with minimal assist required to demonstrate improved functional mobility and safety. 3.  Ms. Yoni Chandra will perform toileting activity with  contact guard assist to demonstrate improved functional mobility and safety. PROGRESSING  4. Ms. Yoni Chandra will perform all functional transfers transfer with contact guard assist to demonstrate improved functional mobility and safety.       PROBLEM LIST:   (Skilled intervention is medically necessary to address:)  Decreased ADL/Functional Activities  Decreased Activity Tolerance  Decreased Balance  Decreased Coordination  Decreased Gait Ability  Decreased Safety Awareness  Decreased Strength  Decreased Transfer Abilities  Increased Pain   INTERVENTIONS PLANNED:   (Benefits and precautions of occupational therapy have been discussed with the patient.)  Self Care Training  Therapeutic Activity  Therapeutic Exercise/HEP  Neuromuscular Re-education  Education         TREATMENT:       TREATMENT:     SELF CARE: (45 minutes):   Procedure(s) (per grid) utilized to improve and/or restore self-care/home management as related to dressing, bathing, toileting, grooming, and functional transfers . Required minimal verbal cueing to facilitate activities of daily living skills, compensatory activities, and Ot poc and discharge planning .     AFTER TREATMENT PRECAUTIONS: Bed, Bed/Chair Locked, Call light within reach, Needs within reach, RN notified, Side rails x3, and Visitors at bedside    INTERDISCIPLINARY COLLABORATION:  RN/ PCT, PT/ PTA, and OT/ KIRAN    EDUCATION:     [x] Safe And Effective Hygiene  [x] Fall Precautions  [] Hip Precautions  [] D/C Instruction Review [] Prosthesis Review  [x] Walker Management/Safety  [x] Adaptive Equipment as Needed  [] Therapeutic Resting Position of Joint       TOTAL TREATMENT DURATION AND TIME:  Time In: 0930  Time Out: 6175  Minutes: [address], [place]

## 2022-09-28 NOTE — DISCHARGE INSTRUCTIONS
[facility]      Patient Discharge Instructions    Charlotte Max / [id number] : 1960    Admitted 2022 Discharged: 2022     IF YOU HAVE ANY PROBLEMS ONCE YOU ARE AT HOME CALL THE FOLLOWING NUMBERS:   Main office number: [phone]      Medications    The medications you are to continue on are listed on the medication reconciliation sheet. Narcotic pain medications as well as supplemental iron can cause constipation. If this occurs try stopping the narcotic pain medication and/or the iron. It is important that you take the medication exactly as they are prescribed. Medications which increase your risk of blood clots are listed to stop for 5 weeks after surgery as well as medications or supplements which increase your risk of bleeding complications. Keep your medication in the bottles provided by the pharmacist and keep a list of the medication names, dosages, and times to be taken in your wallet. Do not take other medications without consulting your doctor. Important Information    Do NOT smoke as this will greatly increase your risk of infection! Resume your prehospital diet. If you have excessive nausea or vomitting call your doctor's office     Leg swelling and warmth is normal for 6 months after surgery. If you experience swelling in your leg elevate you leg while laying down with your toes above your heart. If you have sudden onset severe swelling with leg pain call our office. The stitches deep inside take approximately 6 months to dissolve. There will be sharp shooting, stinging and burning pain. This is normal and will resolve between 3-6 months after surgery. Difficulty sleeping is normal following total Knee and Hip replacement. You may try melatonin, an over-the-counter sleep aid or benadryl to help with sleep. Most patients will resume sleeping through the night 8 weeks after surgery. Home Physical Therapy is arranged.  Home Health will contact you within 48 hrs of discharge that you have chosen. If you have not received a call within this time frame please contact that provider you chose. You should be given this information before you leave the hospital.     You are at a risk for falls. Use the rolling walker when walking. Patients who have had a joint replacement should not drive if they are still taking narcotic pain mediation during the daytime hours. Most patients wean themselves off of pain medication within 2-5 weeks after surgery. When to Call the office    - If you have a temperature greater then 101  - Uncontrolled vomiting   - Loose control of your bladder or bowel function  - Are unable to bear any wieght   - Need a pain medication refill          Total Knee Replacement: What to Expect at  [address] had a total knee replacement. The doctor replaced the worn ends of the bones that connect to your knee (thighbone and lower leg bone) with plastic and metal parts. When you leave the hospital, you should be able to move around with a walker or crutches. But you will need someone to help you at home until you have more energy and can move around better. You will go home with a bandage and stitches, staples, skin glue, or tape strips. Change the bandage as your doctor tells you to. If you have stitches or staples, your doctor will remove them about 2 weeks after your surgery. Glue or tape strips will fall off on their own over time. You may still have some mild pain, and the area may be swollen for a few months after surgery. Your knee will continue to improve for up to a year. You will probably use a walker for some time after surgery. When you are ready, you can use a cane. You may be able to walk without support after a couple weeks, or when you are comfortable. You will need to do months of physical rehabilitation (rehab) after a knee replacement.  Rehab will help you strengthen the muscles of the knee and help you regain movement. After you recover, your artificial knee will allow you to do normal daily activities with less pain or no pain at all. You may be able to hike, dance, or ride a bike. Talk to your doctor about whether you can do more strenuous activities. Always tell your caregivers that you have an artificial knee. How long it will take to walk on your own, return to normal activities, and go back to work depends on your health and how well your rehabilitation (rehab) program goes. The better you do with your rehab exercises, the quicker you will get your strength and movement back. This care sheet gives you a general idea about how long it will take for you to recover. But each person recovers at a different pace. Follow the steps below to get better as quickly as possible. How can you care for yourself at home? Activity    Rest when you feel tired. You may take a nap, but don't stay in bed all day. When you sit, use a chair with arms. You can use the arms to help you stand up. Work with your physical therapist to find the best way to exercise. What you can do as your knee heals will depend on whether your new knee is cemented or uncemented. You may not be able to do certain things for a while if your new knee is uncemented. After your knee has healed enough, you can do more strenuous activities with caution. You can golf, but you may want to use a golf cart for some time. And don't wear shoes with spikes. You can bike on a flat road or on a stationary bike. Talk to your doctor before biking uphill. Your doctor may suggest that you stay away from activities that put stress on your knee. These include tennis, badminton, contact sports like football, jumping (such as in basketball), jogging, and running. Avoid activities where you might fall. Do not sit for more than 1 hour at a time. Get up and walk around for a while before you sit again.  If you must sit for a long time, prop up your leg with a medicine safely. Blood thinners can cause serious bleeding problems. This medicine could be in pill form or as a shot (injection). If a shot is needed, your doctor will tell you how to do this. Be safe with medicines. Take pain medicines exactly as directed. If the doctor gave you a prescription medicine for pain, take it as prescribed. If you are not taking a prescription pain medicine, ask your doctor if you can take an over-the-counter medicine. Plan to take your pain medicine 30 minutes before exercises. It is easier to prevent pain before it starts than to stop it after it has started. If you think your pain medicine is making you sick to your stomach: Take your medicine after meals (unless your doctor has told you not to). Ask your doctor for a different pain medicine. If your doctor prescribed antibiotics, take them as directed. Do not stop taking them just because you feel better. You need to take the full course of antibiotics. Incision care    If your doctor told you how to care for your cut (incision), follow your doctor's instructions. You will have a dressing over the cut. A dressing helps the incision heal and protects it. Your doctor will tell you how to take care of this. If you did not get instructions, follow this general advice: If you have strips of tape on the cut the doctor made, leave the tape on for a week or until it falls off. If you have stitches or staples, your doctor will tell you when to come back to have them removed. If you have skin glue on the cut, leave it on until it falls off. Skin glue is also called skin adhesive or liquid stitches. Change the bandage every day. Wash the area daily with warm water, and pat it dry. Don't use hydrogen peroxide or alcohol. They can slow healing. You may cover the area with a gauze bandage if it oozes fluid or rubs against clothing. You may shower 24 to 48 hours after surgery. Pat the incision dry.  Don't swim or take a bath for the first 2 weeks, or until your doctor tells you it is okay. Exercise    Your rehab program will give you a number of exercises to do to help you get back your knee's range of motion and strength. Always do them as your therapist tells you. Ice    For pain and swelling, put ice or a cold pack on the area for 10 to 20 minutes at a time. Put a thin cloth between the ice and your skin. If your doctor recommended cold therapy using a portable machine, follow the instructions that came with the machine. Other instructions    Wear compression stockings if your doctor told you to. These may help to prevent blood clots. Your doctor will tell you how long you need to keep wearing the compression stockings. Carry a medical alert card that says you have an artificial joint. You have metal pieces in your knee. These may set off some airport metal detectors. Follow-up care is a key part of your treatment and safety. Be sure to make and go to all appointments, and call your doctor if you are having problems. It's also a good idea to know your test results and keep a list of the medicines you take. When should you call for help? Call 911 anytime you think you may need emergency care. For example, call if:    You passed out (lost consciousness). You have severe trouble breathing. You have sudden chest pain and shortness of breath, or you cough up blood. Call your doctor now or seek immediate medical care if:    You have signs of infection, such as: Increased pain, swelling, warmth, or redness. Red streaks leading from the incision. Pus draining from the incision. A fever. You have signs of a blood clot, such as:  Pain in your calf, back of the knee, thigh, or groin. Redness and swelling in your leg or groin. Your incision comes open and begins to bleed, or the bleeding increases. You have pain that does not get better after you take pain medicine.    Watch closely for changes in your health, and be sure to contact your doctor if:    You do not have a bowel movement after taking a laxative. Where can you learn more? Go to [email or web address] and sign in to your [employer] account. Enter Z905 in the [facility] box to learn more about \"Total Knee Replacement: What to Expect at Home. \"     If you do not have an account, please click on the \"Sign Up Now\" link. Current as of: March 9, 2022               Content Version: 13.4  © 4497-4721 Healthwise, [employer]. Care instructions adapted under license by [facility] ([facility]). If you have questions about a medical condition or this instruction, always ask your healthcare professional. [address] any warranty or liability for your use of this information. Information obtained by :  I understand that if any problems occur once I am at home I am to contact my physician. I understand and acknowledge receipt of the instructions indicated above.                                                                                                                                            Physician's or R.N.'s Signature                                                                  Date/Time                                                                                                                                              Patient or Representative Signature                                                          Date/Time

## 2022-09-28 NOTE — PROGRESS NOTES
Name: Marlo Scales  YOB: 1960  Gender: female  MRN: [id number]           September 28, 2022         Post Op day: 1 Day Post-Op     Admit Date: 9/27/2022  Admit Diagnosis: Primary osteoarthritis of right knee [M17.11]  Arthritis of right knee [M17.11]  Procedure: Procedure(s) (LRB):  KNEE TOTAL ARTHROPLASTY ROBOTIC/ RIGHT (Right)     LAB:    Recent Results (from the past 24 hour(s))   POCT Glucose    Collection Time: 09/27/22  9:51 AM   Result Value Ref Range    POC Glucose 157 (H) 65 - 100 mg/dL    Performed by: Jaden    CBC    Collection Time: 09/27/22 10:32 AM   Result Value Ref Range    WBC 5.5 4.3 - 11.1 K/uL    RBC 2.53 (L) 4.05 - 5.2 M/uL    Hemoglobin 7.0 (L) 11.7 - 15.4 g/dL    Hematocrit 22.1 (L) 35.8 - 46.3 %    MCV 87.4 79.6 - 97.8 FL    MCH 27.7 26.1 - 32.9 PG    MCHC 31.7 31.4 - 35.0 g/dL    RDW 14.9 (H) 11.9 - 14.6 %    Platelets 953 761 - 087 K/uL    MPV 10.1 9.4 - 12.3 FL    nRBC 0.00 0.0 - 0.2 K/uL   Hemoglobin and Hematocrit    Collection Time: 09/27/22  8:10 PM   Result Value Ref Range    Hemoglobin 11.1 (L) 11.7 - 15.4 g/dL    Hematocrit 34.1 (L) 35.8 - 46.3 %   POCT Glucose    Collection Time: 09/27/22  8:52 PM   Result Value Ref Range    POC Glucose 222 (H) 65 - 100 mg/dL    Performed by: Chang Mcginnis    Basic Metabolic Panel w/ Reflex to MG    Collection Time: 09/28/22  4:05 AM   Result Value Ref Range    Sodium 142 136 - 145 mmol/L    Potassium 4.1 3.5 - 5.1 mmol/L    Chloride 109 101 - 110 mmol/L    CO2 29 21 - 32 mmol/L    Anion Gap 4 4 - 13 mmol/L    Glucose 91 65 - 100 mg/dL    BUN 11 8 - 23 MG/DL    Creatinine 0.94 0.6 - 1.0 MG/DL    GFR African American >60 >60 ml/min/1.73m2    GFR Non- >60 >60 ml/min/1.73m2    Calcium 8.3 8.3 - 10.4 MG/DL   POCT Glucose    Collection Time: 09/28/22  6:19 AM   Result Value Ref Range    POC Glucose 97 65 - 100 mg/dL    Performed by: Ras      Vital Signs:    Patient Vitals for the past 8 hrs:   BP Temp Temp src Pulse Resp SpO2   22 0732 110/66 98.2 °F (36.8 °C) Oral 65 18 95 %   22 0712 -- -- -- (!) 49 16 95 %   22 0247 (!) 144/81 97.6 °F (36.4 °C) Oral (!) 49 16 94 %   22 0245 -- -- -- -- -- 95 %     Temp (24hrs), Av.8 °F (36.6 °C), Min:97.5 °F (36.4 °C), Max:98.2 °F (36.8 °C)    Body mass index is 38.31 kg/m².   Pain Control:        Subjective: Awake and alert/     Objective: Vital Signs are Stable, No Acute Distress, Alert and Oriented, Dressing is Dry, Calves soft, Neurovascular exam is normal.        PT/OT:     Ambulation/Gait (if applicable):          Weight Bearing Status: WBAT    Meds:  amLODIPine, 10 mg, Daily  cetirizine, 10 mg, Daily  chlorthalidone, 25 mg, Daily  fluticasone, 2 spray, Daily  insulin lispro, 16 Units, TID AC  insulin glargine, 44 Units, Nightly  levothyroxine, 150 mcg, QAM AC  losartan, 50 mg, Daily  metoprolol tartrate, 25 mg, BID  pantoprazole, 40 mg, QAM AC  sodium chloride flush, 5-40 mL, 2 times per day  acetaminophen, 650 mg, Q6H  sennosides-docusate sodium, 1 tablet, BID  aspirin, 81 mg, BID  celecoxib, 200 mg, Daily  potassium chloride, 40 mEq, Once      lactated ringers  sodium chloride  sodium chloride, Last Rate: 75 mL/hr at 22 2108      albuterol sulfate HFA, 2 puff, Q4H PRN  sodium chloride flush, 5-40 mL, PRN  sodium chloride, , PRN  HYDROmorphone, 0.5 mg, Q3H PRN   Or  HYDROmorphone, 1 mg, Q3H PRN  promethazine, 25 mg, Q6H PRN   Or  ondansetron, 4 mg, Q6H PRN  aluminum & magnesium hydroxide-simethicone, 15 mL, Q6H PRN  diphenhydramine, 25 mg, Q6H PRN   Or  diphenhydrAMINE, 25 mg, Q6H PRN  naloxone, 0.4 mg, PRN  HYDROmorphone, 2 mg, Q4H PRN  HYDROmorphone, 4 mg, Q4H PRN  potassium chloride, 40 mEq, PRN   Or  potassium alternative oral replacement, 40 mEq, PRN   Or  potassium chloride, 10 mEq, PRN        Assessment:    Patient Active Problem List   Diagnosis    Acute CVA (cerebrovascular accident) ([facility] [address]    Primary osteoarthritis of right knee    Snoring    Noncompliance with CPAP treatment    Arthritis of right knee    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 Home soon.     Had neuro consult yest. with \"psycogenic sx activity\" dxed - now resolved and is now at baseline       Signed By: Queenie Mendoza MD

## 2022-09-28 NOTE — PROGRESS NOTES
Pt discharge summary and home medication sheet reviewed with pt and signed. Copy given for take home use. All goals met. Pt waiting to eat lunch and to receive po pain meds prior to discharge.

## 2022-10-19 ENCOUNTER — TRANSCRIBE ORDERS (OUTPATIENT)
Dept: SCHEDULING | Age: 62
End: 2022-10-19

## 2022-10-19 DIAGNOSIS — N64.4 MASTODYNIA: Primary | ICD-10-CM

## 2022-10-19 DIAGNOSIS — M17.11 PRIMARY OSTEOARTHRITIS OF RIGHT KNEE: Primary | ICD-10-CM

## 2022-10-19 RX ORDER — HYDROCODONE BITARTRATE AND ACETAMINOPHEN 7.5; 325 MG/1; MG/1
1-2 TABLET ORAL EVERY 4 HOURS PRN
Qty: 60 TABLET | Refills: 0 | Status: SHIPPED | OUTPATIENT
Start: 2022-10-19 | End: 2022-10-26

## 2022-10-19 NOTE — TELEPHONE ENCOUNTER
She is requesting a refill of pain meds. She wants a different medication. Please give her a call to discuss.

## 2022-10-31 ENCOUNTER — OFFICE VISIT (OUTPATIENT)
Dept: ORTHOPEDIC SURGERY | Age: 62
End: 2022-10-31

## 2022-10-31 DIAGNOSIS — Z96.651 HISTORY OF TOTAL KNEE ARTHROPLASTY, RIGHT: Primary | ICD-10-CM

## 2022-10-31 DIAGNOSIS — Z09 FOLLOW-UP EXAMINATION: ICD-10-CM

## 2022-10-31 DIAGNOSIS — Z96.651 HISTORY OF TOTAL KNEE ARTHROPLASTY, RIGHT: ICD-10-CM

## 2022-10-31 DIAGNOSIS — M25.561 ACUTE PAIN OF RIGHT KNEE: Primary | ICD-10-CM

## 2022-10-31 PROCEDURE — 99024 POSTOP FOLLOW-UP VISIT: CPT | Performed by: ORTHOPAEDIC SURGERY

## 2022-10-31 RX ORDER — METHOCARBAMOL 750 MG/1
TABLET, FILM COATED ORAL
Qty: 40 TABLET | Refills: 0 | Status: SHIPPED | OUTPATIENT
Start: 2022-10-31

## 2022-10-31 RX ORDER — DICLOFENAC SODIUM 75 MG/1
75 TABLET, DELAYED RELEASE ORAL 2 TIMES DAILY
Qty: 60 TABLET | Refills: 0 | Status: SHIPPED | OUTPATIENT
Start: 2022-10-31

## 2022-10-31 NOTE — PROGRESS NOTES
Name: Dewey Wick  YOB: 1960  Gender: female  MRN: [id number]    RIGHT Post-Op TKA 4 week follow up    This patient returns now four week status post s/p TKA. Things have gone reasonably well with therapy and the patient is now weaning from the cane. The pain level has improved. There has been no significant problem since the patient was last seen. On exam, the incision is healing approriately. ROM is 5 to 90. The patient has minimal antalgia in stance and good alignment in stance. Radiographs are obtained. Standing AP, flexion lateral and sunrise views of the RIGHT knee demonstrates well positioned TKA with good bone implant interfaces. No significant abnormalities are seen. RADIOGRAPHIC IMPRESSION: Stable RIGHT TKA. CLINICAL IMPRESSION AND PLAN: Now four weeks s/p TKA . The patient is doing reasonably well. I have made recommendations about activity. We will ask the patient to continue to wean from the cane. Recommendations for further therapy include OUTPATIENT THERAPY FOR 3-4 WEEKS. The patient will follow back up in 3 weeks for ROM recheck.     Work/Activity Restrictions: NOT APPLICABLE    LU Ayala

## 2022-11-02 ENCOUNTER — HOSPITAL ENCOUNTER (OUTPATIENT)
Dept: MAMMOGRAPHY | Age: 62
Discharge: HOME | End: 2022-11-05
Payer: OTHER GOVERNMENT

## 2022-11-02 ENCOUNTER — APPOINTMENT (OUTPATIENT)
Dept: MAMMOGRAPHY | Age: 62
End: 2022-11-02
Payer: OTHER GOVERNMENT

## 2022-11-02 DIAGNOSIS — N64.4 MASTODYNIA: ICD-10-CM

## 2022-11-02 PROCEDURE — 76642 ULTRASOUND BREAST LIMITED: CPT

## 2022-11-02 PROCEDURE — 77066 DX MAMMO INCL CAD BI: CPT

## 2022-11-21 ENCOUNTER — OFFICE VISIT (OUTPATIENT)
Dept: ORTHOPEDIC SURGERY | Age: 62
End: 2022-11-21

## 2022-11-21 DIAGNOSIS — Z09 SURGERY FOLLOW-UP: Primary | ICD-10-CM

## 2022-11-21 DIAGNOSIS — Z96.651 PRESENCE OF TOTAL KNEE JOINT PROSTHESIS, RIGHT: ICD-10-CM

## 2023-01-11 ENCOUNTER — OFFICE VISIT (OUTPATIENT)
Dept: ORTHOPEDIC SURGERY | Age: 63
End: 2023-01-11

## 2023-01-11 DIAGNOSIS — Z96.651 HISTORY OF TOTAL KNEE ARTHROPLASTY, RIGHT: ICD-10-CM

## 2023-01-11 DIAGNOSIS — Z09 FOLLOW-UP EXAMINATION: Primary | ICD-10-CM

## 2023-01-11 NOTE — PROGRESS NOTES
Name: Valentín Whelan  YOB: 1960  Gender: female  MRN: [id number]      Chief complaint:  RIGHT KNEE PAIN    History of Present Illness:     Valentín Whelan is a 60 y.o. female  Returns today for recheck guarding her right TKA performed at the end of September. Her primary reason for return today is because of continued pain over the medial aspect of her right knee. She is continue work with outpatient physical therapy on her range of motion as well as beginning some strength training exercises. Her physical therapist suggested that she may want to have her right knee checked due to continued pain in the right knee. Currently she is walking without any assistive device. Denies any recent fall, injury. Also denies any fever, chills or malaise. Past Medical History: Allergies: Allergies   Allergen Reactions    Adhesive Tape Other (See Comments)     \"burns \" skin       Medications:  Current Outpatient Medications   Medication Sig    diclofenac (VOLTAREN) 75 MG EC tablet Take 1 tablet by mouth 2 times daily    methocarbamol (ROBAXIN-750) 750 MG tablet Take 1 tablet by mouth every 6 hours as needed for spasms. aspirin 81 MG EC tablet Take 1 tablet by mouth 2 times daily    celecoxib (CELEBREX) 200 MG capsule Take 1 capsule by mouth daily    promethazine (PHENERGAN) 25 MG tablet Take 1 tablet by mouth every 6 hours as needed for Nausea    chlorthalidone (HYGROTON) 25 MG tablet Take 25 mg by mouth daily    losartan (COZAAR) 50 MG tablet Take 50 mg by mouth daily    levothyroxine (SYNTHROID) 150 MCG tablet Take 150 mcg by mouth Daily    omeprazole (PRILOSEC) 20 MG delayed release capsule Take 20 mg by mouth daily    cetirizine (ZYRTEC) 10 MG tablet Take 10 mg by mouth daily    albuterol sulfate HFA (PROVENTIL;VENTOLIN;PROAIR) 108 (90 Base) MCG/ACT inhaler ProAir HFA 90 mcg/actuation aerosol inhaler   Inhale 2 puff(s) EVERY 4 HOURS as needed by inhalation route.     amLODIPine (NORVASC) 10 MG tablet amlodipine 10 mg tablet   Take 1 tablet every day by oral route. fluticasone (FLONASE) 50 MCG/ACT nasal spray Flonase Allergy Relief 50 mcg/actuation nasal spray,suspension   Spray 1 spray twice a day by intranasal route. metoprolol tartrate (LOPRESSOR) 25 MG tablet Take 50 mg by mouth 2 times daily    vitamin D (CHOLECALCIFEROL) 25 MCG (1000 UT) TABS tablet Take 1,000 Units by mouth daily    insulin aspart (NOVOLOG) 100 UNIT/ML injection pen Inject 16 Units into the skin 3 times daily (before meals)    insulin glargine (LANTUS;BASAGLAR) 100 UNIT/ML injection pen Inject 55 Units into the skin nightly     No current facility-administered medications for this visit. Past Medical history:  Past Medical History:   Diagnosis Date    Chronic cough     believes to be related to lisinopril, medication changed 8/30/22    Diabetes ([facility] [address]     NIDDM, average 's, symptomatic for hypoglycemia in the 60's, A1c 7.0 on 9/12/22    Endocrine disease     Environmental allergies     PRN inhaler--last used 9/8/22    Hypertension     Other ill-defined conditions(799.89)     heart murmur    Other ill-defined conditions(799.89)     anemia    Seizures ([facility] [address]     one time after anesthesia ~ 15 years ago    Sleep disorder     CPAP    TIA (transient ischemic attack) 04/2021        has a past surgical history that includes Breast biopsy (Right); Breast reduction surgery (Bilateral); orthopedic surgery; other surgical history; pr unlisted procedure abdomen peritoneum & omentum; gyn; Breast surgery; and Total knee arthroplasty (Right, 9/27/2022). Family History:  [unfilled]     Social History:  [unfilled]    Review of Systems:       Physical Exam:    General:   Alert and oriented    Gait:          She walks without the use of any assistive device but does keep her right knee flexed as she ambulates. Back:        Diffuse tenderness over lower back. RIGHT Hip:    Skin is intact.   There is pain with palpation over the greater trochanter. No groin pain and restricted ROM of the hip     RIGHT Knee: Skin: Well-healed surgical incision                    Effusion: no                    Tenderness to palpation: none                    Patella grind test: no                    Stability:  Valgus: yes Varus: yes AP: yes                    Ayo's test: negative                    Quad Strength: good                    ROM   Extension: 5-7  Flexion:115                    Popliteal cyst : no                    Calf pain : no                    Edema left leg: none noted    Neurovascular:  Patient has good pulses distally. They have intact motor and sensory function. X-rays:  Not repeated    Diagnoses:  3-month status post right TKA with continued pain and chronic low back pain with IT band symptomatology    Plan:  She was counseled her right knee overall does appear stable. She is also counseled on reasonable expectations in a TKA 3 months postop. Having intermittent pain and swelling is normal, especially with her continue with her outpatient physical therapy sessions. We did briefly discuss stopping physical therapy in an outpatient setting and just doing exercise on her own to see if it will help with her symptoms. She is going to consider this. She will also continue with anti-inflammatories which will hopefully help with her symptoms as well. We also discussed that her back is a factor causing continued hamstring tightness, and this can in turn cause continued pain and tightness in the front part of the knee. We did educator on how she should ambulate with a reciprocal heel toe gait. She was reassured by all this and will continue with her exercises and increase during her activity on her own. We will see her back in 3 months for her midterm follow-up.       LU Osei

## 2023-05-17 ENCOUNTER — OFFICE VISIT (OUTPATIENT)
Dept: ORTHOPEDIC SURGERY | Age: 63
End: 2023-05-17

## 2023-05-17 DIAGNOSIS — M54.50 CHRONIC BILATERAL LOW BACK PAIN, UNSPECIFIED WHETHER SCIATICA PRESENT: ICD-10-CM

## 2023-05-17 DIAGNOSIS — G89.29 CHRONIC BILATERAL LOW BACK PAIN, UNSPECIFIED WHETHER SCIATICA PRESENT: ICD-10-CM

## 2023-05-17 DIAGNOSIS — Z96.651 PRESENCE OF TOTAL KNEE JOINT PROSTHESIS, RIGHT: ICD-10-CM

## 2023-05-17 DIAGNOSIS — M17.12 PRIMARY OSTEOARTHRITIS OF LEFT KNEE: Primary | ICD-10-CM

## 2023-05-17 DIAGNOSIS — Z09 SURGERY FOLLOW-UP: ICD-10-CM

## 2023-05-17 RX ORDER — METHYLPREDNISOLONE ACETATE 40 MG/ML
40 INJECTION, SUSPENSION INTRA-ARTICULAR; INTRALESIONAL; INTRAMUSCULAR; SOFT TISSUE ONCE
Status: COMPLETED | OUTPATIENT
Start: 2023-05-17 | End: 2023-05-17

## 2023-05-17 RX ADMIN — METHYLPREDNISOLONE ACETATE 40 MG: 40 INJECTION, SUSPENSION INTRA-ARTICULAR; INTRALESIONAL; INTRAMUSCULAR; SOFT TISSUE at 11:27

## 2023-05-17 NOTE — PROGRESS NOTES
Name: Marissa Finnegan  YOB: 1960  Gender: female  MRN: [id number]    Post-Op RIGHT TKA: 6 months    HPI: This patient returns now six months out from surgery. She continues to have some difficulty with her lower back. She has achieved a good range of motion with her knee and continues to be see an increased level of activity. They have weaned away from the cane. Her left knee is giving her more difficulty. She is known to have some patellofemoral and lateral compartment arthritis there as well with a similar situation as she had prior to knee replacement surgery on the right. She has cries regarding things she might do for her left knee in light of her continued recovery from her right total knee arthroplasty. Interval medical change is noted on the patient history form which is updated today. Past Medical History: Allergies: Allergies   Allergen Reactions    Adhesive Tape Other (See Comments)     \"burns \" skin       Medications:  Current Outpatient Medications   Medication Sig    diclofenac (VOLTAREN) 75 MG EC tablet Take 1 tablet by mouth 2 times daily    methocarbamol (ROBAXIN-750) 750 MG tablet Take 1 tablet by mouth every 6 hours as needed for spasms.     aspirin 81 MG EC tablet Take 1 tablet by mouth 2 times daily    celecoxib (CELEBREX) 200 MG capsule Take 1 capsule by mouth daily    promethazine (PHENERGAN) 25 MG tablet Take 1 tablet by mouth every 6 hours as needed for Nausea    chlorthalidone (HYGROTON) 25 MG tablet Take 25 mg by mouth daily    losartan (COZAAR) 50 MG tablet Take 50 mg by mouth daily    levothyroxine (SYNTHROID) 150 MCG tablet Take 150 mcg by mouth Daily    omeprazole (PRILOSEC) 20 MG delayed release capsule Take 20 mg by mouth daily    cetirizine (ZYRTEC) 10 MG tablet Take 10 mg by mouth daily    albuterol sulfate HFA (PROVENTIL;VENTOLIN;PROAIR) 108 (90 Base) MCG/ACT inhaler ProAir HFA 90 mcg/actuation aerosol inhaler   Inhale 2 puff(s) EVERY 4 HOURS as

## 2023-08-29 ENCOUNTER — CLINICAL DOCUMENTATION (OUTPATIENT)
Dept: ORTHOPEDIC SURGERY | Age: 63
End: 2023-08-29

## 2023-09-15 ENCOUNTER — TELEPHONE (OUTPATIENT)
Dept: ORTHOPEDIC SURGERY | Age: 63
End: 2023-09-15

## 2023-09-15 NOTE — TELEPHONE ENCOUNTER
The [facility] form is ready to  Called pt and is picking it up today at ES office 9/15/23,MB

## 2023-10-24 ENCOUNTER — HOSPITAL ENCOUNTER (EMERGENCY)
Age: 63
Discharge: HOME | End: 2023-10-24
Attending: EMERGENCY MEDICINE | Admitting: EMERGENCY MEDICINE
Payer: OTHER GOVERNMENT

## 2023-10-24 VITALS
HEART RATE: 90 BPM | RESPIRATION RATE: 17 BRPM | OXYGEN SATURATION: 97 % | DIASTOLIC BLOOD PRESSURE: 85 MMHG | HEIGHT: 64 IN | WEIGHT: 218 LBS | SYSTOLIC BLOOD PRESSURE: 147 MMHG | TEMPERATURE: 97.6 F | BODY MASS INDEX: 37.22 KG/M2

## 2023-10-24 DIAGNOSIS — R13.10 DYSPHAGIA, UNSPECIFIED TYPE: Primary | ICD-10-CM

## 2023-10-24 PROCEDURE — 99283 EMERGENCY DEPT VISIT LOW MDM: CPT

## 2023-10-24 PROCEDURE — 6370000000 HC RX 637 (ALT 250 FOR IP)

## 2023-10-24 RX ORDER — LIDOCAINE HYDROCHLORIDE 20 MG/ML
15 SOLUTION OROPHARYNGEAL
Status: COMPLETED | OUTPATIENT
Start: 2023-10-24 | End: 2023-10-24

## 2023-10-24 RX ADMIN — LIDOCAINE HYDROCHLORIDE 15 ML: 20 SOLUTION ORAL; TOPICAL at 18:34

## 2023-10-24 ASSESSMENT — PAIN SCALES - GENERAL: PAINLEVEL_OUTOF10: 9

## 2023-10-24 ASSESSMENT — PAIN - FUNCTIONAL ASSESSMENT: PAIN_FUNCTIONAL_ASSESSMENT: 0-10

## 2023-10-24 NOTE — DISCHARGE INSTRUCTIONS
Please continue to monitor your symptoms at home. Adhere to a liquid/soft food diet to allow for easier swallowing. Try and limit difficult to chew foods such as meats, make sure to chew your food thoroughly. Return to the ED immediately if you begin to experience any difficulty breathing, inability to keep down food or drink, or any new or worsening symptoms. Please follow-up with gastroenterology, their number is listed above.

## 2023-10-24 NOTE — ED TRIAGE NOTES
For two and a half weeks, pt has felt like something is stuck in her throat and has pain when she swallows. Swallowing liquid isn't as painful as swallowing food.

## 2023-10-25 NOTE — ED PROVIDER NOTES
Uvula midline. No pharyngeal swelling, oropharyngeal exudate, posterior oropharyngeal erythema or uvula swelling. Tonsils: No tonsillar exudate or tonsillar abscesses. 0 on the right. 0 on the left. Comments: No sublingual swelling  Eyes:      Conjunctiva/sclera: Conjunctivae normal.      Pupils: Pupils are equal, round, and reactive to light. Neck:      Comments: Full flexion and extension. No submandibular or submental swelling. Cardiovascular:      Rate and Rhythm: Normal rate and regular rhythm. Pulmonary:      Effort: Pulmonary effort is normal. No tachypnea or respiratory distress. Breath sounds: Normal breath sounds. No stridor, decreased air movement or transmitted upper airway sounds. No wheezing, rhonchi or rales. Musculoskeletal:      Cervical back: Normal range of motion. No rigidity. Normal range of motion. Lymphadenopathy:      Cervical: No cervical adenopathy. Skin:     General: Skin is warm. Coloration: Skin is not jaundiced or pale. Neurological:      Mental Status: She is alert and oriented to person, place, and time.    Psychiatric:         Mood and Affect: Mood normal.         Behavior: Behavior normal.          Procedures     Orders Placed This Encounter   Procedures    [address] [facility]        Medications   lidocaine viscous hcl (XYLOCAINE) 2 % solution 15 mL (15 mLs Mouth/Throat Given 10/24/23 1834)       Discharge Medication List as of 10/24/2023  7:30 PM           Past Medical History:   Diagnosis Date    Chronic cough     believes to be related to lisinopril, medication changed 8/30/22    Diabetes ([facility])     NIDDM, average 's, symptomatic for hypoglycemia in the 60's, A1c 7.0 on 9/12/22    Endocrine disease     Environmental allergies     PRN inhaler--last used 9/8/22    Hypertension     Other ill-defined conditions(799.89)     heart murmur    Other ill-defined conditions(799.89)     anemia    Seizures ([facility])     one time

## 2023-11-08 ENCOUNTER — HOSPITAL ENCOUNTER (OUTPATIENT)
Dept: MAMMOGRAPHY | Age: 63
Discharge: HOME | End: 2023-11-11
Payer: OTHER GOVERNMENT

## 2023-11-08 VITALS — BODY MASS INDEX: 37.08 KG/M2 | WEIGHT: 216 LBS

## 2023-11-08 DIAGNOSIS — Z80.3 FAMILY HISTORY OF MALIGNANT NEOPLASM OF BREAST: ICD-10-CM

## 2023-11-08 DIAGNOSIS — N64.4 MASTODYNIA: ICD-10-CM

## 2023-11-08 DIAGNOSIS — Z01.89 ENCOUNTER FOR OTHER SPECIFIED SPECIAL EXAMINATIONS: ICD-10-CM

## 2023-11-08 PROCEDURE — 77066 DX MAMMO INCL CAD BI: CPT

## 2023-11-29 ENCOUNTER — APPOINTMENT (OUTPATIENT)
Dept: GENERAL RADIOLOGY | Age: 63
End: 2023-11-29
Payer: OTHER GOVERNMENT

## 2023-11-29 ENCOUNTER — HOSPITAL ENCOUNTER (EMERGENCY)
Age: 63
Discharge: HOME | End: 2023-11-30
Attending: EMERGENCY MEDICINE
Payer: OTHER GOVERNMENT

## 2023-11-29 DIAGNOSIS — R07.9 CHEST PAIN, UNSPECIFIED TYPE: Primary | ICD-10-CM

## 2023-11-29 DIAGNOSIS — E87.6 HYPOKALEMIA: ICD-10-CM

## 2023-11-29 PROCEDURE — 84484 ASSAY OF TROPONIN QUANT: CPT

## 2023-11-29 PROCEDURE — 99285 EMERGENCY DEPT VISIT HI MDM: CPT

## 2023-11-29 PROCEDURE — 85025 COMPLETE CBC W/AUTO DIFF WBC: CPT

## 2023-11-29 PROCEDURE — 71045 X-RAY EXAM CHEST 1 VIEW: CPT

## 2023-11-29 PROCEDURE — 93005 ELECTROCARDIOGRAM TRACING: CPT | Performed by: EMERGENCY MEDICINE

## 2023-11-29 PROCEDURE — 83735 ASSAY OF MAGNESIUM: CPT

## 2023-11-29 PROCEDURE — 80048 BASIC METABOLIC PNL TOTAL CA: CPT

## 2023-11-29 ASSESSMENT — ENCOUNTER SYMPTOMS
GASTROINTESTINAL NEGATIVE: 1
RESPIRATORY NEGATIVE: 1
BACK PAIN: 0

## 2023-11-29 ASSESSMENT — PAIN - FUNCTIONAL ASSESSMENT: PAIN_FUNCTIONAL_ASSESSMENT: NONE - DENIES PAIN

## 2023-11-30 VITALS
TEMPERATURE: 97.7 F | SYSTOLIC BLOOD PRESSURE: 110 MMHG | DIASTOLIC BLOOD PRESSURE: 65 MMHG | BODY MASS INDEX: 36.7 KG/M2 | HEIGHT: 64 IN | WEIGHT: 215 LBS | HEART RATE: 77 BPM | OXYGEN SATURATION: 90 % | RESPIRATION RATE: 19 BRPM

## 2023-11-30 LAB
ANION GAP SERPL CALC-SCNC: 0 MMOL/L (ref 2–11)
BASOPHILS # BLD: 0.1 K/UL (ref 0–0.2)
BASOPHILS NFR BLD: 1 % (ref 0–2)
BUN SERPL-MCNC: 12 MG/DL (ref 8–23)
CALCIUM SERPL-MCNC: 8.9 MG/DL (ref 8.3–10.4)
CHLORIDE SERPL-SCNC: 111 MMOL/L (ref 101–110)
CO2 SERPL-SCNC: 26 MMOL/L (ref 21–32)
CREAT SERPL-MCNC: 1.2 MG/DL (ref 0.6–1)
DIFFERENTIAL METHOD BLD: ABNORMAL
EKG ATRIAL RATE: 60 BPM
EKG DIAGNOSIS: NORMAL
EKG P AXIS: 65 DEGREES
EKG P-R INTERVAL: 166 MS
EKG Q-T INTERVAL: 421 MS
EKG QRS DURATION: 96 MS
EKG QTC CALCULATION (BAZETT): 421 MS
EKG R AXIS: 55 DEGREES
EKG T AXIS: 61 DEGREES
EKG VENTRICULAR RATE: 60 BPM
EOSINOPHIL # BLD: 0.2 K/UL (ref 0–0.8)
EOSINOPHIL NFR BLD: 2 % (ref 0.5–7.8)
ERYTHROCYTE [DISTWIDTH] IN BLOOD BY AUTOMATED COUNT: 14.2 % (ref 11.9–14.6)
GLUCOSE SERPL-MCNC: 150 MG/DL (ref 65–100)
HCT VFR BLD AUTO: 36.1 % (ref 35.8–46.3)
HGB BLD-MCNC: 12.1 G/DL (ref 11.7–15.4)
IMM GRANULOCYTES # BLD AUTO: 0 K/UL (ref 0–0.5)
IMM GRANULOCYTES NFR BLD AUTO: 0 % (ref 0–5)
LYMPHOCYTES # BLD: 5.2 K/UL (ref 0.5–4.6)
LYMPHOCYTES NFR BLD: 45 % (ref 13–44)
MAGNESIUM SERPL-MCNC: 2.4 MG/DL (ref 1.8–2.4)
MCH RBC QN AUTO: 29.9 PG (ref 26.1–32.9)
MCHC RBC AUTO-ENTMCNC: 33.5 G/DL (ref 31.4–35)
MCV RBC AUTO: 89.1 FL (ref 82–102)
MONOCYTES # BLD: 0.5 K/UL (ref 0.1–1.3)
MONOCYTES NFR BLD: 4 % (ref 4–12)
NEUTS SEG # BLD: 5.5 K/UL (ref 1.7–8.2)
NEUTS SEG NFR BLD: 48 % (ref 43–78)
NRBC # BLD: 0 K/UL (ref 0–0.2)
PLATELET # BLD AUTO: 382 K/UL (ref 150–450)
PLATELET COMMENT: ADEQUATE
PMV BLD AUTO: 10.5 FL (ref 9.4–12.3)
POTASSIUM SERPL-SCNC: 2.9 MMOL/L (ref 3.5–5.1)
RBC # BLD AUTO: 4.05 M/UL (ref 4.05–5.2)
RBC MORPH BLD: ABNORMAL
SODIUM SERPL-SCNC: 137 MMOL/L (ref 133–143)
TROPONIN I SERPL HS-MCNC: 11.7 PG/ML (ref 0–14)
TROPONIN I SERPL HS-MCNC: 12.8 PG/ML (ref 0–14)
WBC # BLD AUTO: 11.5 K/UL (ref 4.3–11.1)
WBC MORPH BLD: ABNORMAL

## 2023-11-30 PROCEDURE — 84484 ASSAY OF TROPONIN QUANT: CPT

## 2023-11-30 PROCEDURE — 6370000000 HC RX 637 (ALT 250 FOR IP): Performed by: EMERGENCY MEDICINE

## 2023-11-30 RX ORDER — POTASSIUM CHLORIDE 20 MEQ/1
40 TABLET, EXTENDED RELEASE ORAL ONCE
Status: COMPLETED | OUTPATIENT
Start: 2023-11-30 | End: 2023-11-30

## 2023-11-30 RX ORDER — POTASSIUM CHLORIDE 20 MEQ/1
20 TABLET, EXTENDED RELEASE ORAL 2 TIMES DAILY
Qty: 10 TABLET | Refills: 0 | Status: SHIPPED | OUTPATIENT
Start: 2023-11-30 | End: 2023-12-05

## 2023-11-30 RX ADMIN — POTASSIUM CHLORIDE 40 MEQ: 1500 TABLET, EXTENDED RELEASE ORAL at 00:47

## 2023-11-30 NOTE — ED PROVIDER NOTES
Emergency Department Provider Note       PCP: TJ Gillette NP   Age: 58 y.o. Sex: female     DISPOSITION Decision To Discharge 11/30/2023 03:00:00 AM       ICD-10-CM    1. Chest pain, unspecified type  R07.9 [place]      2. Hypokalemia  E87.6           Medical Decision Making     Complexity of Problems Addressed:  1 or more acute illnesses that pose a threat to life or bodily function. Data Reviewed and Analyzed:   I independently ordered and reviewed each unique test.  I reviewed external records: ED visit note from an outside group. I independently ordered and interpreted the ED EKG in the absence of a Cardiologist.    See below      I interpreted the X-rays I agree with radiologist interpretation. .  I interpreted the labs. Discussion of management or test interpretation. Patient is here with chest pain. No prior cardiac history. EKG appears unremarkable at this time. Will obtain serial 2 hours troponin. Obtain chest x-ray. Equal distal pulses. Do not suspect dissection. Lungs are clear. Otherwise asymptomatic at this time. No signs of edema. No hypoxia or tachycardia. Low suspicion for pneumonia, pneumothorax, PE, dissection. Abdomen is otherwise soft. 2:18 AM  Repeat troponin pending. Patient well-appearing at this time. Risk of Complications and/or Morbidity of Patient Management:  Prescription drug management performed. Shared medical decision making was utilized in creating the patients health plan today. ED Course as of 12/01/23 0218   Wed Nov 29, 2023   2327 EKG obtained interpreted by me. Rate of 60. Sinus rhythm. Normal axis and interval.  No STEMI or ischemic changes noted [DT]   Thu Nov 30, 2023   0236 Troponin negative. Nonspecific leukocytosis of 11.5. Chest x-ray with elevated right hemidiaphragm. She does not have any cough congestion or pneumonia symptoms. No abdominal pain.   Do not suspect

## 2023-11-30 NOTE — DISCHARGE INSTRUCTIONS
Follow-up with cardiology for evaluation of your chest pain. Take potassium chloride for 5 additional day for repletion. Return immediately for worsening symptoms.

## 2023-11-30 NOTE — ED TRIAGE NOTES
Pt arrived via EMS from home with c/o CP since 2200 tonight. Mid sternal, dull in nature, radiating to both shoulders and jaw. No cardiac hx noted. Pt stated she is not in pain as of right now.      EMS vitals:   HR 60, /74, O2 90% on RA and placed on 3L O2 for comfort,   324 asprin given   20g in L a/c  Normal sinus rhythm

## 2024-02-14 ENCOUNTER — OFFICE VISIT (OUTPATIENT)
Dept: ORTHOPEDIC SURGERY | Age: 64
End: 2024-02-14
Payer: OTHER GOVERNMENT

## 2024-02-14 DIAGNOSIS — M17.12 PRIMARY OSTEOARTHRITIS OF LEFT KNEE: Primary | ICD-10-CM

## 2024-02-14 PROCEDURE — 99214 OFFICE O/P EST MOD 30 MIN: CPT | Performed by: ORTHOPAEDIC SURGERY

## 2024-02-14 NOTE — PROGRESS NOTES
TOTAL ARTHROPLASTY ROBOTIC/ RIGHT performed by Fazal Sanchez MD at [facility] MAIN OR        Physical Examination:  Physical exam: On examination of the patient's gait there is antalgia in stance on the left side.  and there is varus deformity in the left knee    On examination while standing there is decreased flexion in the lumbosacral spine without acute list or spasm.    While seated ,  the Bilateral hip is examined there is full range of motion without obvious issue . .     On examination of the  Left knee :ROM is 0 to 125 There is significant tenderness to direct palpation and There is a mild effusion. On examination of the  Right knee :ROM is 0 to 125 There is a well-healed midline incision with appropriate range of motion and balance.    Patient is neurologically intact distally. Skin is intact.       Imaging:   Radiographs:    An AP standing, skiers, flexion lateral, and sunrise view of the left knee was obtained  This demonstrated  Marked medial joint space narrowing, Marked lateral joint space narrowing, and Marked osteophyte formation in all 3 compartments.    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  This is with underlying inflammatory arthritis with a good previous outcome with right total knee arthroplasty.    I did discuss with the patient the source of the pain and treatment options.  We discussed nonsurgical measures including:Injection therapy, Weight Loss, Activity Modification, and Use of assistive device.     Specifically, we discussed home health physical therapy program and the patient was provided with the joint [place] written home therapy program and protocols.  They are encouraged to begin this in anticipation of surgery.  Additionally we at length discussed the importance of weight management and discuss the importance of an appropriate healthy weight.  They were counseled regarding means to address this as well as directed

## 2024-02-27 DIAGNOSIS — M17.12 PRIMARY OSTEOARTHRITIS OF LEFT KNEE: Primary | ICD-10-CM

## 2024-04-25 ENCOUNTER — PREP FOR PROCEDURE (OUTPATIENT)
Dept: ORTHOPEDIC SURGERY | Age: 64
End: 2024-04-25

## 2024-04-25 DIAGNOSIS — M17.12 PRIMARY OSTEOARTHRITIS OF LEFT KNEE: Primary | ICD-10-CM

## 2024-04-25 RX ORDER — ACETAMINOPHEN 500 MG
1000 TABLET ORAL ONCE
Status: CANCELLED | OUTPATIENT
Start: 2024-04-25 | End: 2024-04-25

## 2024-05-08 ENCOUNTER — OFFICE VISIT (OUTPATIENT)
Dept: ORTHOPEDIC SURGERY | Age: 64
End: 2024-05-08

## 2024-05-08 DIAGNOSIS — M17.12 PRIMARY OSTEOARTHRITIS OF LEFT KNEE: Primary | ICD-10-CM

## 2024-05-08 PROCEDURE — PREOPEXAM PRE-OP EXAM: Performed by: PHYSICIAN ASSISTANT

## 2024-05-08 NOTE — PROGRESS NOTES
Name: Jacqui Gale  YOB: 1960  Gender: female  MRN: [id number]    Pre-Op     CC: LEFT KNEE PAIN       The patient was scheduled for surgery next week for a left TKA.  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  Due to the circumstances, she did have to cancel her surgery for next week.  She will call back when she has more clarity as to which time she could proceed with the surgery.    LU Coto

## 2024-08-27 ENCOUNTER — CLINICAL DOCUMENTATION (OUTPATIENT)
Dept: ORTHOPEDIC SURGERY | Age: 64
End: 2024-08-27

## 2024-10-14 ENCOUNTER — TRANSCRIBE ORDERS (OUTPATIENT)
Dept: SCHEDULING | Age: 64
End: 2024-10-14

## 2024-10-14 DIAGNOSIS — Z12.31 ENCOUNTER FOR SCREENING MAMMOGRAM FOR MALIGNANT NEOPLASM OF BREAST: Primary | ICD-10-CM

## (undated) DEVICE — PIN DRL 4X125 MM ROBOTIC-ASSISTED SOLUTION ARRY VELYS

## (undated) DEVICE — TOTAL KNEE DR RIDGEWAY: Brand: MEDLINE INDUSTRIES, INC.

## (undated) DEVICE — KIT TRK KNEE PROC VIZADISC

## (undated) DEVICE — 450 ML BOTTLE OF 0.05% CHLORHEXIDINE GLUCONATE IN 99.95% STERILE WATER FOR IRRIGATION, USP AND APPLICATOR.: Brand: IRRISEPT ANTIMICROBIAL WOUND LAVAGE

## (undated) DEVICE — BANDAGE COBAN 4 IN COMPR W4INXL5YD FOAM COHESIVE QUIK STK SELF ADH SFT

## (undated) DEVICE — KIT CHECKPOINT MARKER SURGICAL TIBIAL KNEE STERILE LATEX IMPLANTABLE DISPOSABLE MAKO ROBOT

## (undated) DEVICE — SUTURE VCRL SZ 2-0 L27IN ABSRB UD L36MM CP-1 1/2 CIR REV J266H

## (undated) DEVICE — SOLUTION IRRIG 3000ML 0.9% SOD CHL USP UROMATIC PLAS CONT

## (undated) DEVICE — BIPOLAR SEALER 23-112-1 AQM 6.0: Brand: AQUAMANTYS ®

## (undated) DEVICE — YANKAUER,FLEXIBLE HANDLE,REGLR CAPACITY: Brand: MEDLINE INDUSTRIES, INC.

## (undated) DEVICE — SYRINGE MED 50ML LUERLOCK TIP

## (undated) DEVICE — DRESSING HYDROFIBER AQUACEL AG ADVANTAGE 3.5X14 IN

## (undated) DEVICE — SUTURE VCRL SZ 1 L27IN ABSRB UD L36MM CP-1 1/2 CIR REV CUT J268H

## (undated) DEVICE — SUTURE STRATAFIX SPRL SZ 1 L14IN ABSRB VLT L48CM CTX 1/2 SXPD2B405

## (undated) DEVICE — DRAPE EQUIP SATELLITE STN STRL VELYS

## (undated) DEVICE — KIT DRP FOR RIO ROBOTIC ARM ASST SYS

## (undated) DEVICE — GUIDEPIN ORTHOPEDIC NAVIGATION 4X140 MM 2P SCREW STRL

## (undated) DEVICE — Device

## (undated) DEVICE — CLOSURE SKIN FACILITATES COMPATIBILITY W/ CERTAIN IS DSG

## (undated) DEVICE — CLOSURE SKIN FLX NONINVASIVE PRELOC TECHNOLOGY FOR 24IN

## (undated) DEVICE — BLADE SAW RECIPROCAING 67.9X12.5X1X1.2MM 2 SIDE STRYKR NS

## (undated) DEVICE — SOLUTION IV 250ML 0.9% SOD CHL PH 5 INJ USP VIAFLX PLAS

## (undated) DEVICE — BLADE RMR L46MM PAT PILOT H

## (undated) DEVICE — DRAPE EQUIP ROBOT STRL VELYS 20/PK ORDER IN MULTIIPLES OF 20 EACH

## (undated) DEVICE — STERILE PRESSURE PROTECTOR PAD® FOR DE MAYO UNIVERSAL DISTRACTOR® (10/CASE): Brand: DE MAYO UNIVERSAL DISTRACTOR®

## (undated) DEVICE — SOLUTION IRRIG 1000ML 0.9% SOD CHL USP POUR PLAS BTL

## (undated) DEVICE — DUAL CUT SAGITTAL BLADE

## (undated) DEVICE — GUIDEPIN ORTHOPEDIC NAVIGATION 4X110 MM 2P SCREW STRL